# Patient Record
Sex: MALE | Race: WHITE | NOT HISPANIC OR LATINO | Employment: FULL TIME | URBAN - METROPOLITAN AREA
[De-identification: names, ages, dates, MRNs, and addresses within clinical notes are randomized per-mention and may not be internally consistent; named-entity substitution may affect disease eponyms.]

---

## 2017-09-20 ENCOUNTER — TELEPHONE (OUTPATIENT)
Dept: UROLOGY | Facility: CLINIC | Age: 62
End: 2017-09-20

## 2017-09-20 NOTE — TELEPHONE ENCOUNTER
----- Message from Ruth Davis sent at 9/20/2017 11:31 AM CDT -----  Could not make appt today. Would like to reschedule. Next available shows 11/16, pt would like to be seen sooner. Please call back at [phone]. thanks

## 2017-09-25 ENCOUNTER — OFFICE VISIT (OUTPATIENT)
Dept: UROLOGY | Facility: CLINIC | Age: 62
End: 2017-09-25
Payer: COMMERCIAL

## 2017-09-25 ENCOUNTER — LAB VISIT (OUTPATIENT)
Dept: LAB | Facility: HOSPITAL | Age: 62
End: 2017-09-25
Attending: UROLOGY
Payer: COMMERCIAL

## 2017-09-25 VITALS
WEIGHT: 199.44 LBS | DIASTOLIC BLOOD PRESSURE: 83 MMHG | SYSTOLIC BLOOD PRESSURE: 140 MMHG | HEART RATE: 57 BPM | BODY MASS INDEX: 27.04 KG/M2

## 2017-09-25 DIAGNOSIS — N40.0 BENIGN PROSTATIC HYPERPLASIA, PRESENCE OF LOWER URINARY TRACT SYMPTOMS UNSPECIFIED: Primary | ICD-10-CM

## 2017-09-25 DIAGNOSIS — N40.0 BENIGN PROSTATIC HYPERPLASIA, PRESENCE OF LOWER URINARY TRACT SYMPTOMS UNSPECIFIED: ICD-10-CM

## 2017-09-25 LAB — COMPLEXED PSA SERPL-MCNC: 1.6 NG/ML

## 2017-09-25 PROCEDURE — 3077F SYST BP >= 140 MM HG: CPT | Mod: S$GLB,,, | Performed by: UROLOGY

## 2017-09-25 PROCEDURE — 99214 OFFICE O/P EST MOD 30 MIN: CPT | Mod: 25,S$GLB,, | Performed by: UROLOGY

## 2017-09-25 PROCEDURE — 99999 PR PBB SHADOW E&M-EST. PATIENT-LVL II: CPT | Mod: PBBFAC,,, | Performed by: UROLOGY

## 2017-09-25 PROCEDURE — 36415 COLL VENOUS BLD VENIPUNCTURE: CPT

## 2017-09-25 PROCEDURE — 84153 ASSAY OF PSA TOTAL: CPT

## 2017-09-25 PROCEDURE — 3008F BODY MASS INDEX DOCD: CPT | Mod: S$GLB,,, | Performed by: UROLOGY

## 2017-09-25 PROCEDURE — 81002 URINALYSIS NONAUTO W/O SCOPE: CPT | Mod: S$GLB,,, | Performed by: UROLOGY

## 2017-09-25 PROCEDURE — 3079F DIAST BP 80-89 MM HG: CPT | Mod: S$GLB,,, | Performed by: UROLOGY

## 2017-09-25 RX ORDER — FINASTERIDE 5 MG/1
5 TABLET, FILM COATED ORAL DAILY
Qty: 30 TABLET | Refills: 11 | Status: SHIPPED | OUTPATIENT
Start: 2017-09-25 | End: 2018-10-01 | Stop reason: SDUPTHER

## 2017-09-25 RX ORDER — TAMSULOSIN HYDROCHLORIDE 0.4 MG/1
0.4 CAPSULE ORAL DAILY
Qty: 30 CAPSULE | Refills: 11 | Status: SHIPPED | OUTPATIENT
Start: 2017-09-25 | End: 2018-09-28 | Stop reason: SDUPTHER

## 2017-09-25 RX ORDER — ASPIRIN 81 MG/1
81 TABLET ORAL DAILY
COMMUNITY

## 2017-09-25 NOTE — PROGRESS NOTES
"Chief Complaint: Urinary retention    HPI:   9/25/17: No problems at all, Reviewed history in detail.  PSA not done.  Two polyps on colonoscopy were assessed, not worrisome.  7/1/16: No new complaints.  Voiding well.  4/12/16: Alycia: "This 60-year-old male returns to the clinic today because of another episode of urinary retention following gallbladder surgery he had on Friday.  He was catheterized, and has been doing reasonably well with the Soto catheter.  He would prefer to get the catheter out today if that is possible.  My recommendation is to proceed with a voiding trial, and he agrees."  Voided fine  9/9/15: returns after having left IH repair with Dr. Murphy last Friday and entered retention and was discharge with a soto.  Removed today.   7/1/15: No adverse changes - satisfied. PSA approp on finasteride and he is voiding better.  6/25/14: PSA much reduced now on finasteride.  No urinary bother. Since his last visit he is voiding fine and feeling well. Good stream no dribble.  12/20/13: Saw QUINN Gan recently with hesitancy nd elevated PVR while taking cold medications. Instructed to hold them.  Here today with PVR 47ml.  Started finasteride and has been on it.  Last May he had retention  Had elevated PSA and after the instrumentation was removed his PSA lowered appropriately.    6/10/13: Last note: "This patient was seen earlier this year for an acute onset of urinary retention secondary to laparoscopic bilateral hernia repair. He had a voiding trial but failed and had to go back to the ER that night for another catheter. Patient was started on Flomax and had no concerns for a while with another voiding trial a week later. His psa levels were elevated and he completed 4 weeks of cipro and was to have another psa level assessed. Patient presented again as a follow-up to an acute onset of urinary retention, where he presented to [facility] and a soto was placed. Patient was given Levaquin and had his soto " "removed. This was his second time this yr with urinary retention. He feels his stream is okay but sometimes weak; occasional splitting. No gross hematuria before the inguinal hernia surgery but has had some associated with catheterization."    Allergies:  Allergies   Allergen Reactions    Pcn [Penicillins]     Augmentin [Amoxicillin-Pot Clavulanate] Rash       Medications:  has a current medication list which includes the following prescription(s): aspirin, finasteride, olmesartan, and tamsulosin.    PMH:   has a past medical history of BPH (benign prostatic hyperplasia) and HTN (hypertension).    PSH:   has a past surgical history that includes bilateral lap inguinal hernia repair; left ankle; and TONSILLECTOMY, ADENOIDECTOMY.    FamHx: family history is not on file.    SocHx:  reports that he has never smoked. He has never used smokeless tobacco. He reports that he does not drink alcohol. His drug history is not on file.      Physical Exam:  Vitals:    09/25/17 1609   BP: (!) 140/83   Pulse: (!) 57     General: A&Ox3, no apparent distress, no deformities  Neck: No masses, normal thyroid  Abdomen: Soft, NT, ND; has a large reducible left inguinal hernia  Skin: The skin is warm and dry. No jaundice.  Ext: No c/c/e.  :   9/25/17: test desc rima, penis normal, HANDY normal prost 50 gm sm/symm    Labs/Studies:   PVR      6/13: 50 ml    12/20/13: 47 ml    7/1/16: 45 ml  PSA very normal now, 2.2    6/15: 1.6    Impression/Plan:   1. Refilled finasteride/flomax. Doing well RTC 1 yr.           "

## 2017-11-17 ENCOUNTER — TELEPHONE (OUTPATIENT)
Dept: UROLOGY | Facility: CLINIC | Age: 62
End: 2017-11-17

## 2017-11-17 ENCOUNTER — OFFICE VISIT (OUTPATIENT)
Dept: UROLOGY | Facility: CLINIC | Age: 62
End: 2017-11-17
Payer: COMMERCIAL

## 2017-11-17 ENCOUNTER — HOSPITAL ENCOUNTER (EMERGENCY)
Facility: HOSPITAL | Age: 62
Discharge: HOME | End: 2017-11-17
Attending: EMERGENCY MEDICINE
Payer: COMMERCIAL

## 2017-11-17 VITALS
RESPIRATION RATE: 18 BRPM | OXYGEN SATURATION: 96 % | BODY MASS INDEX: 26.85 KG/M2 | HEART RATE: 73 BPM | SYSTOLIC BLOOD PRESSURE: 132 MMHG | DIASTOLIC BLOOD PRESSURE: 77 MMHG | TEMPERATURE: 98 F | WEIGHT: 198 LBS

## 2017-11-17 VITALS
HEART RATE: 70 BPM | SYSTOLIC BLOOD PRESSURE: 134 MMHG | DIASTOLIC BLOOD PRESSURE: 81 MMHG | BODY MASS INDEX: 26.85 KG/M2 | WEIGHT: 198 LBS

## 2017-11-17 DIAGNOSIS — R35.0 URINARY FREQUENCY: Primary | ICD-10-CM

## 2017-11-17 DIAGNOSIS — R10.2 SUPRAPUBIC PAIN: ICD-10-CM

## 2017-11-17 DIAGNOSIS — R33.9 URINARY RETENTION: Primary | ICD-10-CM

## 2017-11-17 LAB
ALBUMIN SERPL BCP-MCNC: 4 G/DL
ALP SERPL-CCNC: 57 U/L
ALT SERPL W/O P-5'-P-CCNC: 25 U/L
ANION GAP SERPL CALC-SCNC: 13 MMOL/L
AST SERPL-CCNC: 23 U/L
BACTERIA #/AREA URNS AUTO: ABNORMAL /HPF
BASOPHILS # BLD AUTO: 0.04 K/UL
BASOPHILS NFR BLD: 0.4 %
BILIRUB SERPL-MCNC: 1.1 MG/DL
BILIRUB UR QL STRIP: NEGATIVE
BUN SERPL-MCNC: 16 MG/DL
CALCIUM SERPL-MCNC: 9.8 MG/DL
CHLORIDE SERPL-SCNC: 106 MMOL/L
CLARITY UR REFRACT.AUTO: CLEAR
CO2 SERPL-SCNC: 21 MMOL/L
COLOR UR AUTO: YELLOW
CREAT SERPL-MCNC: 0.9 MG/DL
DIFFERENTIAL METHOD: ABNORMAL
EOSINOPHIL # BLD AUTO: 0.1 K/UL
EOSINOPHIL NFR BLD: 0.9 %
ERYTHROCYTE [DISTWIDTH] IN BLOOD BY AUTOMATED COUNT: 12.4 %
EST. GFR  (AFRICAN AMERICAN): >60 ML/MIN/1.73 M^2
EST. GFR  (NON AFRICAN AMERICAN): >60 ML/MIN/1.73 M^2
GLUCOSE SERPL-MCNC: 203 MG/DL
GLUCOSE UR QL STRIP: ABNORMAL
HCT VFR BLD AUTO: 44.8 %
HGB BLD-MCNC: 15.8 G/DL
HGB UR QL STRIP: ABNORMAL
KETONES UR QL STRIP: ABNORMAL
LEUKOCYTE ESTERASE UR QL STRIP: NEGATIVE
LIPASE SERPL-CCNC: 19 U/L
LYMPHOCYTES # BLD AUTO: 1.1 K/UL
LYMPHOCYTES NFR BLD: 11.5 %
MCH RBC QN AUTO: 31.9 PG
MCHC RBC AUTO-ENTMCNC: 35.3 G/DL
MCV RBC AUTO: 90 FL
MICROSCOPIC COMMENT: ABNORMAL
MONOCYTES # BLD AUTO: 0.6 K/UL
MONOCYTES NFR BLD: 6.4 %
NEUTROPHILS # BLD AUTO: 7.4 K/UL
NEUTROPHILS NFR BLD: 80.5 %
NITRITE UR QL STRIP: NEGATIVE
NON-SQ EPI CELLS #/AREA URNS AUTO: 7 /HPF
PH UR STRIP: 6 [PH] (ref 5–8)
PLATELET # BLD AUTO: 206 K/UL
PMV BLD AUTO: 10.2 FL
POTASSIUM SERPL-SCNC: 3.9 MMOL/L
PROT SERPL-MCNC: 7.7 G/DL
PROT UR QL STRIP: NEGATIVE
RBC # BLD AUTO: 4.96 M/UL
RBC #/AREA URNS AUTO: >100 /HPF (ref 0–4)
SODIUM SERPL-SCNC: 140 MMOL/L
SP GR UR STRIP: 1.02 (ref 1–1.03)
URN SPEC COLLECT METH UR: ABNORMAL
UROBILINOGEN UR STRIP-ACNC: NEGATIVE EU/DL
WBC # BLD AUTO: 9.17 K/UL
WBC #/AREA URNS AUTO: 5 /HPF (ref 0–5)

## 2017-11-17 PROCEDURE — P9612 CATHETERIZE FOR URINE SPEC: HCPCS

## 2017-11-17 PROCEDURE — 80053 COMPREHEN METABOLIC PANEL: CPT

## 2017-11-17 PROCEDURE — 99999 PR PBB SHADOW E&M-EST. PATIENT-LVL II: CPT | Mod: PBBFAC,,, | Performed by: UROLOGY

## 2017-11-17 PROCEDURE — 99214 OFFICE O/P EST MOD 30 MIN: CPT | Mod: S$GLB,,, | Performed by: UROLOGY

## 2017-11-17 PROCEDURE — 81000 URINALYSIS NONAUTO W/SCOPE: CPT

## 2017-11-17 PROCEDURE — 85025 COMPLETE CBC W/AUTO DIFF WBC: CPT

## 2017-11-17 PROCEDURE — 99284 EMERGENCY DEPT VISIT MOD MDM: CPT | Mod: 25

## 2017-11-17 PROCEDURE — 83690 ASSAY OF LIPASE: CPT

## 2017-11-17 PROCEDURE — 51702 INSERT TEMP BLADDER CATH: CPT

## 2017-11-17 NOTE — PROGRESS NOTES
"Chief Complaint: Urinary retention    HPI:   11/17/17: Over last 3-4d got worsening stream, urgency, SP tenderness; last night entered retention.  Hardly anything came out when catheter was put in though (maybe 50ml).  Was having a ton of pain before that; can't say if it was back or bladder or brain.  Catheter did relieve the symptoms though.  No constipation daily BM but looser lately.  No dysuria.  Taking flomax/finasteride.  9/25/17: No problems at all, Reviewed history in detail.  PSA not done.  Two polyps on colonoscopy were assessed, not worrisome.  7/1/16: No new complaints.  Voiding well.  4/12/16: Alycia: "This 60-year-old male returns to the clinic today because of another episode of urinary retention following gallbladder surgery he had on Friday.  He was catheterized, and has been doing reasonably well with the Soto catheter.  He would prefer to get the catheter out today if that is possible.  My recommendation is to proceed with a voiding trial, and he agrees."  Voided fine  9/9/15: returns after having left IH repair with Dr. Murphy last Friday and entered retention and was discharge with a soto.  Removed today.   7/1/15: No adverse changes - satisfied. PSA approp on finasteride and he is voiding better.  6/25/14: PSA much reduced now on finasteride.  No urinary bother. Since his last visit he is voiding fine and feeling well. Good stream no dribble.  12/20/13: Saw L. Rushing recently with hesitancy nd elevated PVR while taking cold medications. Instructed to hold them.  Here today with PVR 47ml.  Started finasteride and has been on it.  Last May he had retention  Had elevated PSA and after the instrumentation was removed his PSA lowered appropriately.    6/10/13: Last note: "This patient was seen earlier this year for an acute onset of urinary retention secondary to laparoscopic bilateral hernia repair. He had a voiding trial but failed and had to go back to the ER that night for another catheter. " "Patient was started on Flomax and had no concerns for a while with another voiding trial a week later. His psa levels were elevated and he completed 4 weeks of cipro and was to have another psa level assessed. Patient presented again as a follow-up to an acute onset of urinary retention, where he presented to [facility] and a soto was placed. Patient was given Levaquin and had his soto removed. This was his second time this yr with urinary retention. He feels his stream is okay but sometimes weak; occasional splitting. No gross hematuria before the inguinal hernia surgery but has had some associated with catheterization."    Allergies:  Allergies   Allergen Reactions    Pcn [Penicillins]     Augmentin [Amoxicillin-Pot Clavulanate] Rash       Medications:  has a current medication list which includes the following prescription(s): aspirin, finasteride, olmesartan, and tamsulosin.    PMH:   has a past medical history of BPH (benign prostatic hyperplasia) and HTN (hypertension).    PSH:   has a past surgical history that includes bilateral lap inguinal hernia repair; left ankle; and TONSILLECTOMY, ADENOIDECTOMY.    FamHx: family history is not on file.    SocHx:  reports that he has never smoked. He has never used smokeless tobacco. He reports that he does not drink alcohol. His drug history is not on file.      Physical Exam:  Vitals:    11/17/17 1315   BP: 134/81   Pulse: 70     General: A&Ox3, no apparent distress, no deformities  Neck: No masses, normal thyroid  Abdomen: Soft, NT, ND; has a large reducible left inguinal hernia  Skin: The skin is warm and dry. No jaundice.  Ext: No c/c/e.  :   9/25/17: test desc rima, penis normal, HANDY normal prost 50 gm sm/symm    Labs/Studies:   PVR      6/13: 50 ml    12/20/13: 47 ml    7/1/16: 45 ml  PSA very normal now, 2.2    6/15: 1.6    Impression/Plan:   1. RTC next week for cysto/uroflow.          "

## 2017-11-17 NOTE — TELEPHONE ENCOUNTER
----- Message from Jordan Muller sent at 11/17/2017 10:32 AM CST -----  Contact: pt  He's calling in regards to being worked into the drs schedule for a ER f/u appt, [phone] (cell)

## 2017-11-17 NOTE — ED PROVIDER NOTES
Encounter Date: 11/17/2017       History     Chief Complaint   Patient presents with    Urinary Retention     minimal amount of urination x ~ 3 days; reports drops at a time; has had the same issues in the past     Urinary retention since this afternoon.  Unable to empty bladder.  Pt states he has an extremely weak stream when urinating. Hx of bph on medication.  No dysuria.  Came to ER for further evaluation and treatment.      The history is provided by the patient.     Review of patient's allergies indicates:   Allergen Reactions    Pcn [penicillins]     Augmentin [amoxicillin-pot clavulanate] Rash     Past Medical History:   Diagnosis Date    BPH (benign prostatic hyperplasia)     HTN (hypertension)      Past Surgical History:   Procedure Laterality Date    bilateral lap inguinal hernia repair      left ankle      TONSILLECTOMY, ADENOIDECTOMY       No family history on file.  Social History   Substance Use Topics    Smoking status: Never Smoker    Smokeless tobacco: Never Used    Alcohol use No     Review of Systems   Constitutional: Negative for fever.   HENT: Negative for sore throat.    Respiratory: Negative for shortness of breath.    Cardiovascular: Negative for chest pain.   Gastrointestinal: Negative for nausea.   Genitourinary: Positive for dysuria.   Musculoskeletal: Negative for back pain.   Skin: Negative for rash.   Neurological: Negative for weakness.   Hematological: Does not bruise/bleed easily.   All other systems reviewed and are negative.      Physical Exam     Initial Vitals [11/17/17 0316]   BP Pulse Resp Temp SpO2   (!) 179/95 64 20 -- (!) 94 %      MAP       123         Physical Exam    Nursing note and vitals reviewed.  Constitutional: He appears well-developed and well-nourished. He is not diaphoretic. No distress.   HENT:   Head: Normocephalic and atraumatic.   Eyes: EOM are normal. Pupils are equal, round, and reactive to light. No scleral icterus.   Neck: Normal range of  motion. Neck supple. No thyromegaly present.   Cardiovascular: Normal rate, regular rhythm, normal heart sounds and intact distal pulses. Exam reveals no gallop and no friction rub.    No murmur heard.  Pulmonary/Chest: Breath sounds normal. No respiratory distress. He has no wheezes. He has no rhonchi. He exhibits no tenderness.   Abdominal: Soft. Bowel sounds are normal. He exhibits no distension. There is tenderness in the suprapubic area. There is no rigidity, no rebound, no guarding, no CVA tenderness, no tenderness at McBurney's point and negative Walton's sign.       Mild palpable bladder prior to soto placement.  350 ml noted with bladder scan.  Soto placed.  Pt states immediate relief of pressure/discomfort.   Musculoskeletal: Normal range of motion. He exhibits no edema or tenderness.   Lymphadenopathy:     He has no cervical adenopathy.   Neurological: He is alert and oriented to person, place, and time. He has normal strength. No cranial nerve deficit or sensory deficit.   Skin: Skin is warm and dry.   Psychiatric: He has a normal mood and affect. His behavior is normal. Judgment and thought content normal.         ED Course   Procedures  Labs Reviewed   CBC W/ AUTO DIFFERENTIAL - Abnormal; Notable for the following:        Result Value    MCH 31.9 (*)     Gran% 80.5 (*)     Lymph% 11.5 (*)     All other components within normal limits   COMPREHENSIVE METABOLIC PANEL - Abnormal; Notable for the following:     CO2 21 (*)     Glucose 203 (*)     Total Bilirubin 1.1 (*)     All other components within normal limits   URINALYSIS - Abnormal; Notable for the following:     Glucose, UA 1+ (*)     Ketones, UA Trace (*)     Occult Blood UA 3+ (*)     All other components within normal limits   URINALYSIS MICROSCOPIC - Abnormal; Notable for the following:     RBC, UA >100 (*)     Non-Squam Epith 7 (*)     All other components within normal limits   LIPASE                Vitals:    11/17/17 0316 11/17/17 0402  11/17/17 0640   BP: (!) 179/95  132/77   Pulse: 64  73   Resp: 20  18   Temp:  97.8 °F (36.6 °C) 97.9 °F (36.6 °C)   TempSrc:  Oral Oral   SpO2: (!) 94%  96%   Weight: 89.8 kg (198 lb)         Results for orders placed or performed during the hospital encounter of 11/17/17   CBC W/ AUTO DIFFERENTIAL   Result Value Ref Range    WBC 9.17 3.90 - 12.70 K/uL    RBC 4.96 4.60 - 6.20 M/uL    Hemoglobin 15.8 14.0 - 18.0 g/dL    Hematocrit 44.8 40.0 - 54.0 %    MCV 90 82 - 98 fL    MCH 31.9 (H) 27.0 - 31.0 pg    MCHC 35.3 32.0 - 36.0 g/dL    RDW 12.4 11.5 - 14.5 %    Platelets 206 150 - 350 K/uL    MPV 10.2 9.2 - 12.9 fL    Gran # 7.4 1.8 - 7.7 K/uL    Lymph # 1.1 1.0 - 4.8 K/uL    Mono # 0.6 0.3 - 1.0 K/uL    Eos # 0.1 0.0 - 0.5 K/uL    Baso # 0.04 0.00 - 0.20 K/uL    Gran% 80.5 (H) 38.0 - 73.0 %    Lymph% 11.5 (L) 18.0 - 48.0 %    Mono% 6.4 4.0 - 15.0 %    Eosinophil% 0.9 0.0 - 8.0 %    Basophil% 0.4 0.0 - 1.9 %    Differential Method Automated    Comp. Metabolic Panel   Result Value Ref Range    Sodium 140 136 - 145 mmol/L    Potassium 3.9 3.5 - 5.1 mmol/L    Chloride 106 95 - 110 mmol/L    CO2 21 (L) 23 - 29 mmol/L    Glucose 203 (H) 70 - 110 mg/dL    BUN, Bld 16 8 - 23 mg/dL    Creatinine 0.9 0.5 - 1.4 mg/dL    Calcium 9.8 8.7 - 10.5 mg/dL    Total Protein 7.7 6.0 - 8.4 g/dL    Albumin 4.0 3.5 - 5.2 g/dL    Total Bilirubin 1.1 (H) 0.1 - 1.0 mg/dL    Alkaline Phosphatase 57 55 - 135 U/L    AST 23 10 - 40 U/L    ALT 25 10 - 44 U/L    Anion Gap 13 8 - 16 mmol/L    eGFR if African American >60.0 >60 mL/min/1.73 m^2    eGFR if non African American >60.0 >60 mL/min/1.73 m^2   Lipase   Result Value Ref Range    Lipase 19 4 - 60 U/L   Urinalysis - Cath.   Result Value Ref Range    Specimen UA Urine, Catheterized     Color, UA Yellow Yellow, Straw, Kyra    Appearance, UA Clear Clear    pH, UA 6.0 5.0 - 8.0    Specific Gravity, UA 1.025 1.005 - 1.030    Protein, UA Negative Negative    Glucose, UA 1+ (A) Negative    Ketones, UA  Trace (A) Negative    Bilirubin (UA) Negative Negative    Occult Blood UA 3+ (A) Negative    Nitrite, UA Negative Negative    Urobilinogen, UA Negative <2.0 EU/dL    Leukocytes, UA Negative Negative   Urinalysis Microscopic   Result Value Ref Range    RBC, UA >100 (H) 0 - 4 /hpf    WBC, UA 5 0 - 5 /hpf    Bacteria, UA None None-Occ /hpf    Non-Squam Epith 7 (A) <1/hpf /hpf    Microscopic Comment SEE COMMENT          Imaging Results    None         Medications - No data to display    6:02 AM - Re-evaluation: The patient is resting comfortably and is in no acute distress.  symtoms improved with placement of soto.  Will d/c c soto to f/u c urology.  Information given for soto care and pt states he has been discharged with indwelling soto in past. I believe blood noted in urine is from placement of soto but advised to f/u to recheck UA. He states that his symptoms have improved after treatment within ER. Discussed test results, shared treatment plan, specific conditions for return, and importance of follow up with patient and family.  He understands and agrees with the plan as discussed. Answered  his questions at this time. He has remained hemodynamically stable throughout the ED course and is appropriate for discharge home.      Pre-hypertension/Hypertension: The pt has been informed that they may have pre-hypertension or hypertension based on a blood pressure reading in the ED. I recommend that the pt call the PCP listed on their discharge instructions or a physician of their choice this week to arrange f/u for further evaluation of possible pre-hypertension or hypertension.     Delfino Monk was given a handout which discussed their disease process, precautions, and instructions for follow-up and therapy.    Follow-up Information     Edgard Mendenhall MD. Schedule an appointment as soon as possible for a visit in 1 week.    Specialty:  Family Medicine  Contact information:  [address]  [place] [address]  [phone]             Bradly Green IV, MD. Schedule an appointment as soon as possible for a visit in 4 days.    Specialty:  Urology  Contact information:  [address]  [place] [address]  [phone]             [facility].    Specialty:  Emergency Medicine  Why:  As needed, If symptoms worsen  Contact information:  [address] [address] [address] [phone]                 Discharge Medication List as of 11/17/2017  6:09 AM             ED Diagnosis  1. Urinary retention                          ED Course      Clinical Impression:   The encounter diagnosis was Urinary retention.    Disposition:   Disposition: Discharged  Condition: Stable                        Paco Sutton Jr., MD  11/17/17 5925

## 2017-11-17 NOTE — TELEPHONE ENCOUNTER
Spoke with patient and scheduled ER follow up with Dr. Green today to discuss urinary retention. Chávez was placed in ER.

## 2017-11-20 ENCOUNTER — OFFICE VISIT (OUTPATIENT)
Dept: UROLOGY | Facility: CLINIC | Age: 62
End: 2017-11-20
Payer: COMMERCIAL

## 2017-11-20 DIAGNOSIS — N32.81 OAB (OVERACTIVE BLADDER): Primary | ICD-10-CM

## 2017-11-20 DIAGNOSIS — N40.0 BENIGN PROSTATIC HYPERPLASIA, UNSPECIFIED WHETHER LOWER URINARY TRACT SYMPTOMS PRESENT: ICD-10-CM

## 2017-11-20 PROCEDURE — 99499 UNLISTED E&M SERVICE: CPT | Mod: S$GLB,,, | Performed by: UROLOGY

## 2017-11-20 PROCEDURE — 96372 THER/PROPH/DIAG INJ SC/IM: CPT | Mod: 59,S$GLB,, | Performed by: UROLOGY

## 2017-11-20 PROCEDURE — 99999 PR PBB SHADOW E&M-EST. PATIENT-LVL II: CPT | Mod: PBBFAC,,, | Performed by: UROLOGY

## 2017-11-20 PROCEDURE — 52000 CYSTOURETHROSCOPY: CPT | Mod: S$GLB,,, | Performed by: UROLOGY

## 2017-11-20 RX ORDER — OXYBUTYNIN CHLORIDE 5 MG/1
5 TABLET ORAL 3 TIMES DAILY
Qty: 90 TABLET | Refills: 11 | Status: SHIPPED | OUTPATIENT
Start: 2017-11-20 | End: 2017-12-06

## 2017-11-20 RX ORDER — CEFTRIAXONE 1 G/1
1 INJECTION, POWDER, FOR SOLUTION INTRAMUSCULAR; INTRAVENOUS
Status: COMPLETED | OUTPATIENT
Start: 2017-11-20 | End: 2017-11-20

## 2017-11-20 RX ADMIN — CEFTRIAXONE 1 G: 1 INJECTION, POWDER, FOR SOLUTION INTRAMUSCULAR; INTRAVENOUS at 11:11

## 2017-11-20 NOTE — PROGRESS NOTES
..Using aseptic technique, Rocephin 1 gm adm to right ventrogluteal as per written order of Dr. Green.  Pt tolerated procedure well and waited in room for observation for 20 minutes; and left clinic per self without difficulty.

## 2017-11-20 NOTE — PROGRESS NOTES
"Chief Complaint: Urinary retention    HPI:   11/20/17: Cysto today shows very large median lobe but also strong evidence of severe OAB with bladder spasms at 150 ml pushing water out around the scope.  11/17/17: Over last 3-4d got worsening stream, urgency, SP tenderness; last night entered retention.  Hardly anything came out when catheter was put in though (maybe 50ml).  Was having a ton of pain before that; can't say if it was back or bladder or brain.  Catheter did relieve the symptoms though.  No constipation daily BM but looser lately.  No dysuria.  Taking flomax/finasteride.  9/25/17: No problems at all, Reviewed history in detail.  PSA not done.  Two polyps on colonoscopy were assessed, not worrisome.  7/1/16: No new complaints.  Voiding well.  4/12/16: Alycia: "This 60-year-old male returns to the clinic today because of another episode of urinary retention following gallbladder surgery he had on Friday.  He was catheterized, and has been doing reasonably well with the Soto catheter.  He would prefer to get the catheter out today if that is possible.  My recommendation is to proceed with a voiding trial, and he agrees."  Voided fine  9/9/15: returns after having left IH repair with Dr. Murphy last Friday and entered retention and was discharge with a soto.  Removed today.   7/1/15: No adverse changes - satisfied. PSA approp on finasteride and he is voiding better.  6/25/14: PSA much reduced now on finasteride.  No urinary bother. Since his last visit he is voiding fine and feeling well. Good stream no dribble.  12/20/13: Saw L. Rushing recently with hesitancy nd elevated PVR while taking cold medications. Instructed to hold them.  Here today with PVR 47ml.  Started finasteride and has been on it.  Last May he had retention  Had elevated PSA and after the instrumentation was removed his PSA lowered appropriately.    6/10/13: Last note: "This patient was seen earlier this year for an acute onset of urinary " "retention secondary to laparoscopic bilateral hernia repair. He had a voiding trial but failed and had to go back to the ER that night for another catheter. Patient was started on Flomax and had no concerns for a while with another voiding trial a week later. His psa levels were elevated and he completed 4 weeks of cipro and was to have another psa level assessed. Patient presented again as a follow-up to an acute onset of urinary retention, where he presented to [facility] and a soto was placed. Patient was given Levaquin and had his soto removed. This was his second time this yr with urinary retention. He feels his stream is okay but sometimes weak; occasional splitting. No gross hematuria before the inguinal hernia surgery but has had some associated with catheterization."    Allergies:  Allergies   Allergen Reactions    Pcn [Penicillins]     Augmentin [Amoxicillin-Pot Clavulanate] Rash       Medications:  has a current medication list which includes the following prescription(s): aspirin, finasteride, olmesartan, and tamsulosin.    PMH:   has a past medical history of BPH (benign prostatic hyperplasia) and HTN (hypertension).    PSH:   has a past surgical history that includes bilateral lap inguinal hernia repair; left ankle; and TONSILLECTOMY, ADENOIDECTOMY.    FamHx: family history is not on file.    SocHx:  reports that he has never smoked. He has never used smokeless tobacco. He reports that he does not drink alcohol. His drug history is not on file.      Physical Exam:  There were no vitals filed for this visit.  General: A&Ox3, no apparent distress, no deformities  Neck: No masses, normal thyroid  Abdomen: Soft, NT, ND; has a large reducible left inguinal hernia  Skin: The skin is warm and dry. No jaundice.  Ext: No c/c/e.  :   9/25/17: test desc rima, penis normal, HANDY normal prost 50 gm sm/symm    Labs/Studies:   PVR      6/13: 50 ml    12/20/13: 47 ml    7/1/16: 45 ml  PSA very normal now, 2.2    6/15: " 1.6    Procedure: Diagnostic Cystoscopy    Procedure in Detail: After proper consents were obtained, the patient was prepped and draped in normal sterile fashion for diagnostic cystoscopy. 5 ml of lidocaine jelly was instilled in the urethra. The flexible cystoscope was then introduced into the urethra, and advanced into the bladder under direct vision. The urethral mucosa appeared normal, and no strictures were noted. The sphincter appeared to be normal, and the veru montanum was unremarkable. The prostatic mucosa and the lateral lobes of the prostate were opposed and obstructing with very large median lobe. The bladder neck was normal. Inspection of the interior of the bladder was then carried out. The trigone was unremarkable, with no mucosal lesions. The ureteral orifices were normal in position and configuration. Systematic inspection of the mucosa of the bladder it was then carried out, rotating the cystoscope so that all areas of the left and right lateral walls, the dome of the bladder, and the posterior wall were all visualized. The cystoscope was then advanced further into the bladder, and maximum deflection of the scope was performed so that the bladder neck could be inspected. No mucosal lesions were noted there. The cystoscope was then removed, and the procedure terminated.     Findings: BPH with very large median lobe, obvious poor compliance with OAB    ABx: Rocephin 1 gm IM    Impression/Plan:   1. BPH with large median lobe with OAB overlaid as presenting complaint.  Will continue flomax/finasteride and try oxytutinin.  He will hold oxybutinin if retention occurs.  RTC 2 weeks.

## 2017-11-29 ENCOUNTER — TELEPHONE (OUTPATIENT)
Dept: UROLOGY | Facility: CLINIC | Age: 62
End: 2017-11-29

## 2017-11-29 NOTE — TELEPHONE ENCOUNTER
Spoke with pt, he saw a urologist in [place] because of decreased urine output. They placed a soto & drained 800 ml. He has a fu with Dr. Green on 12/6. States he will come to office to get a stabilizer for leg bag.

## 2017-12-02 ENCOUNTER — HOSPITAL ENCOUNTER (EMERGENCY)
Facility: HOSPITAL | Age: 62
Discharge: HOME | End: 2017-12-02
Attending: EMERGENCY MEDICINE
Payer: COMMERCIAL

## 2017-12-02 VITALS
HEIGHT: 72 IN | SYSTOLIC BLOOD PRESSURE: 132 MMHG | TEMPERATURE: 97 F | DIASTOLIC BLOOD PRESSURE: 78 MMHG | RESPIRATION RATE: 18 BRPM | BODY MASS INDEX: 26.47 KG/M2 | HEART RATE: 84 BPM | WEIGHT: 195.44 LBS | OXYGEN SATURATION: 99 %

## 2017-12-02 DIAGNOSIS — N20.1 URETERAL CALCULUS, RIGHT: Primary | ICD-10-CM

## 2017-12-02 LAB
ANION GAP SERPL CALC-SCNC: 9 MMOL/L
BACTERIA #/AREA URNS HPF: NORMAL /HPF
BASOPHILS # BLD AUTO: 0.03 K/UL
BASOPHILS NFR BLD: 0.2 %
BILIRUB UR QL STRIP: NEGATIVE
BUN SERPL-MCNC: 15 MG/DL
CALCIUM SERPL-MCNC: 9.1 MG/DL
CAOX CRY URNS QL MICRO: NORMAL
CHLORIDE SERPL-SCNC: 104 MMOL/L
CLARITY UR: CLEAR
CO2 SERPL-SCNC: 23 MMOL/L
COLOR UR: YELLOW
CREAT SERPL-MCNC: 1.3 MG/DL
DIFFERENTIAL METHOD: ABNORMAL
EOSINOPHIL # BLD AUTO: 0 K/UL
EOSINOPHIL NFR BLD: 0.2 %
ERYTHROCYTE [DISTWIDTH] IN BLOOD BY AUTOMATED COUNT: 12.6 %
EST. GFR  (AFRICAN AMERICAN): >60 ML/MIN/1.73 M^2
EST. GFR  (NON AFRICAN AMERICAN): 58 ML/MIN/1.73 M^2
GLUCOSE SERPL-MCNC: 133 MG/DL
GLUCOSE UR QL STRIP: NEGATIVE
HCT VFR BLD AUTO: 42.9 %
HGB BLD-MCNC: 15.5 G/DL
HGB UR QL STRIP: ABNORMAL
KETONES UR QL STRIP: ABNORMAL
LEUKOCYTE ESTERASE UR QL STRIP: ABNORMAL
LYMPHOCYTES # BLD AUTO: 1.4 K/UL
LYMPHOCYTES NFR BLD: 11.4 %
MCH RBC QN AUTO: 32.6 PG
MCHC RBC AUTO-ENTMCNC: 36.1 G/DL
MCV RBC AUTO: 90 FL
MICROSCOPIC COMMENT: NORMAL
MONOCYTES # BLD AUTO: 1.3 K/UL
MONOCYTES NFR BLD: 10.6 %
NEUTROPHILS # BLD AUTO: 9.3 K/UL
NEUTROPHILS NFR BLD: 77.6 %
NITRITE UR QL STRIP: NEGATIVE
PH UR STRIP: 6 [PH] (ref 5–8)
PLATELET # BLD AUTO: 203 K/UL
PMV BLD AUTO: 9.9 FL
POTASSIUM SERPL-SCNC: 4 MMOL/L
PROT UR QL STRIP: NEGATIVE
RBC # BLD AUTO: 4.75 M/UL
RBC #/AREA URNS HPF: 1 /HPF (ref 0–4)
SODIUM SERPL-SCNC: 136 MMOL/L
SP GR UR STRIP: <=1.005 (ref 1–1.03)
SQUAMOUS #/AREA URNS HPF: 1 /HPF
URN SPEC COLLECT METH UR: ABNORMAL
UROBILINOGEN UR STRIP-ACNC: NEGATIVE EU/DL
WBC # BLD AUTO: 12.02 K/UL
WBC #/AREA URNS HPF: 5 /HPF (ref 0–5)

## 2017-12-02 PROCEDURE — 25000003 PHARM REV CODE 250: Performed by: EMERGENCY MEDICINE

## 2017-12-02 PROCEDURE — 99284 EMERGENCY DEPT VISIT MOD MDM: CPT | Mod: 25

## 2017-12-02 PROCEDURE — 63600175 PHARM REV CODE 636 W HCPCS: Performed by: EMERGENCY MEDICINE

## 2017-12-02 PROCEDURE — P9612 CATHETERIZE FOR URINE SPEC: HCPCS

## 2017-12-02 PROCEDURE — 96374 THER/PROPH/DIAG INJ IV PUSH: CPT

## 2017-12-02 PROCEDURE — 85025 COMPLETE CBC W/AUTO DIFF WBC: CPT

## 2017-12-02 PROCEDURE — 80048 BASIC METABOLIC PNL TOTAL CA: CPT

## 2017-12-02 PROCEDURE — 81000 URINALYSIS NONAUTO W/SCOPE: CPT

## 2017-12-02 PROCEDURE — 96361 HYDRATE IV INFUSION ADD-ON: CPT

## 2017-12-02 PROCEDURE — 96375 TX/PRO/DX INJ NEW DRUG ADDON: CPT

## 2017-12-02 RX ORDER — HYDROCODONE BITARTRATE AND ACETAMINOPHEN 7.5; 325 MG/1; MG/1
1 TABLET ORAL EVERY 4 HOURS PRN
Qty: 15 TABLET | Refills: 0 | Status: SHIPPED | OUTPATIENT
Start: 2017-12-02 | End: 2017-12-27

## 2017-12-02 RX ORDER — ONDANSETRON 2 MG/ML
4 INJECTION INTRAMUSCULAR; INTRAVENOUS
Status: COMPLETED | OUTPATIENT
Start: 2017-12-02 | End: 2017-12-02

## 2017-12-02 RX ORDER — KETOROLAC TROMETHAMINE 30 MG/ML
15 INJECTION, SOLUTION INTRAMUSCULAR; INTRAVENOUS
Status: COMPLETED | OUTPATIENT
Start: 2017-12-02 | End: 2017-12-02

## 2017-12-02 RX ORDER — MORPHINE SULFATE 2 MG/ML
6 INJECTION, SOLUTION INTRAMUSCULAR; INTRAVENOUS
Status: COMPLETED | OUTPATIENT
Start: 2017-12-02 | End: 2017-12-02

## 2017-12-02 RX ORDER — ONDANSETRON 4 MG/1
4 TABLET, FILM COATED ORAL EVERY 8 HOURS PRN
Qty: 12 TABLET | Refills: 0 | Status: SHIPPED | OUTPATIENT
Start: 2017-12-02 | End: 2017-12-27

## 2017-12-02 RX ADMIN — ONDANSETRON HYDROCHLORIDE 4 MG: 2 INJECTION, SOLUTION INTRAMUSCULAR; INTRAVENOUS at 08:12

## 2017-12-02 RX ADMIN — Medication 6 MG: at 08:12

## 2017-12-02 RX ADMIN — SODIUM CHLORIDE 1000 ML: 0.9 INJECTION, SOLUTION INTRAVENOUS at 08:12

## 2017-12-02 RX ADMIN — KETOROLAC TROMETHAMINE 15 MG: 30 INJECTION, SOLUTION INTRAMUSCULAR at 08:12

## 2017-12-02 NOTE — ED PROVIDER NOTES
SCRIBE #1 NOTE: I, Arielle Haile, am scribing for, and in the presence of, Anthony Galindo MD. I have scribed the entire note.      History      Chief Complaint   Patient presents with    Back Pain     reports having a cath in and started having right lower back pain.        Review of patient's allergies indicates:   Allergen Reactions    Pcn [penicillins]     Augmentin [amoxicillin-pot clavulanate] Rash        HPI   HPI    12/2/2017, 7:41 AM   History obtained from the patient      History of Present Illness: Delfino Monk is a 62 y.o. male patient with a PMHx of an enlarged prostate who presents to the Emergency Department for a stabbing, sharp R flank pain which onset gradually yesterday. Pt has a soto cath in place. Pt states the pain was an 8/10 last PM and currently rates his pain a 5/10 in severity. No mitigating or exacerbating factors reported. No other associated sxs reported. Patient denies any fever, chills, n/v, abd pain, dysuria, hematuria, frequency, and all other sxs at this time. Pt is currently taking Flomax and oxybutynin. No further complaints or concerns at this time.       Arrival mode: Personal vehicle    PCP: Edgard Mendenhall MD       Past Medical History:  Past Medical History:   Diagnosis Date    BPH (benign prostatic hyperplasia)     HTN (hypertension)        Past Surgical History:  Past Surgical History:   Procedure Laterality Date    bilateral lap inguinal hernia repair      left ankle      TONSILLECTOMY, ADENOIDECTOMY           Family History:  History reviewed. No pertinent family history.    Social History:  Social History     Social History Main Topics    Smoking status: Never Smoker    Smokeless tobacco: Never Used    Alcohol use No    Drug use: Unknown    Sexual activity: unknown       ROS   Review of Systems   Constitutional: Negative for chills and fever.   HENT: Negative for sore throat.    Respiratory: Negative for shortness of breath.    Cardiovascular:  Negative for chest pain.   Gastrointestinal: Negative for abdominal pain, nausea and vomiting.   Genitourinary: Positive for flank pain (R). Negative for dysuria, frequency and hematuria.   Musculoskeletal: Negative for back pain.   Skin: Negative for rash.   Neurological: Negative for weakness.   Hematological: Does not bruise/bleed easily.   All other systems reviewed and are negative.      Physical Exam      Initial Vitals [12/02/17 0641]   BP Pulse Resp Temp SpO2   (!) 153/84 79 19 97.7 °F (36.5 °C) 96 %      MAP       107          Physical Exam  Nursing Notes and Vital Signs Reviewed.  Constitutional: Patient is in no acute distress. Well-developed and well-nourished.  Head: Atraumatic. Normocephalic.  Eyes: PERRL. EOM intact. Conjunctivae are not pale. No scleral icterus.  ENT: Mucous membranes are moist. Oropharynx is clear and symmetric.    Neck: Supple. Full ROM. No lymphadenopathy.  Cardiovascular: Regular rate. Regular rhythm. No murmurs, rubs, or gallops. Distal pulses are 2+ and symmetric.  Pulmonary/Chest: No respiratory distress. Clear to auscultation bilaterally. No wheezing or rales.  Abdominal: Soft and non-distended.  There is no tenderness.  No rebound, guarding, or rigidity. Good bowel sounds.  Genitourinary: No CVA tenderness. Chávez in place.   Musculoskeletal: Moves all extremities. No obvious deformities. No edema. No calf tenderness.  Skin: Warm and dry.  Neurological:  Alert, awake, and appropriate.  Normal speech.  No acute focal neurological deficits are appreciated.  Psychiatric: Normal affect. Good eye contact. Appropriate in content.    ED Course    Procedures  ED Vital Signs:  Vitals:    12/02/17 0641 12/02/17 0828 12/02/17 1024   BP: (!) 153/84 (!) 140/70 132/78   Pulse: 79 78 84   Resp: 19 20 18   Temp: 97.7 °F (36.5 °C) 98 °F (36.7 °C) 97.2 °F (36.2 °C)   TempSrc: Oral Oral Oral   SpO2: 96% 98% 99%   Weight: 88.6 kg (195 lb 7 oz)     Height: 6' (1.829 m)         Abnormal Lab  Results:  Labs Reviewed   URINALYSIS - Abnormal; Notable for the following:        Result Value    Specific Gravity, UA <=1.005 (*)     Ketones, UA Trace (*)     Occult Blood UA 2+ (*)     Leukocytes, UA Trace (*)     All other components within normal limits   CBC W/ AUTO DIFFERENTIAL - Abnormal; Notable for the following:     MCH 32.6 (*)     MCHC 36.1 (*)     Gran # 9.3 (*)     Mono # 1.3 (*)     Gran% 77.6 (*)     Lymph% 11.4 (*)     All other components within normal limits   BASIC METABOLIC PANEL - Abnormal; Notable for the following:     Glucose 133 (*)     eGFR if non  58 (*)     All other components within normal limits   URINALYSIS MICROSCOPIC        All Lab Results:  Results for orders placed or performed during the hospital encounter of 12/02/17   Urinalysis Catheterized   Result Value Ref Range    Specimen UA Urine, Clean Catch     Color, UA Yellow Yellow, Straw, Kyra    Appearance, UA Clear Clear    pH, UA 6.0 5.0 - 8.0    Specific Gravity, UA <=1.005 (A) 1.005 - 1.030    Protein, UA Negative Negative    Glucose, UA Negative Negative    Ketones, UA Trace (A) Negative    Bilirubin (UA) Negative Negative    Occult Blood UA 2+ (A) Negative    Nitrite, UA Negative Negative    Urobilinogen, UA Negative <2.0 EU/dL    Leukocytes, UA Trace (A) Negative   CBC auto differential   Result Value Ref Range    WBC 12.02 3.90 - 12.70 K/uL    RBC 4.75 4.60 - 6.20 M/uL    Hemoglobin 15.5 14.0 - 18.0 g/dL    Hematocrit 42.9 40.0 - 54.0 %    MCV 90 82 - 98 fL    MCH 32.6 (H) 27.0 - 31.0 pg    MCHC 36.1 (H) 32.0 - 36.0 g/dL    RDW 12.6 11.5 - 14.5 %    Platelets 203 150 - 350 K/uL    MPV 9.9 9.2 - 12.9 fL    Gran # 9.3 (H) 1.8 - 7.7 K/uL    Lymph # 1.4 1.0 - 4.8 K/uL    Mono # 1.3 (H) 0.3 - 1.0 K/uL    Eos # 0.0 0.0 - 0.5 K/uL    Baso # 0.03 0.00 - 0.20 K/uL    Gran% 77.6 (H) 38.0 - 73.0 %    Lymph% 11.4 (L) 18.0 - 48.0 %    Mono% 10.6 4.0 - 15.0 %    Eosinophil% 0.2 0.0 - 8.0 %    Basophil% 0.2 0.0 - 1.9  %    Differential Method Automated    Basic metabolic panel   Result Value Ref Range    Sodium 136 136 - 145 mmol/L    Potassium 4.0 3.5 - 5.1 mmol/L    Chloride 104 95 - 110 mmol/L    CO2 23 23 - 29 mmol/L    Glucose 133 (H) 70 - 110 mg/dL    BUN, Bld 15 8 - 23 mg/dL    Creatinine 1.3 0.5 - 1.4 mg/dL    Calcium 9.1 8.7 - 10.5 mg/dL    Anion Gap 9 8 - 16 mmol/L    eGFR if African American >60 >60 mL/min/1.73 m^2    eGFR if non African American 58 (A) >60 mL/min/1.73 m^2   Urinalysis Microscopic   Result Value Ref Range    RBC, UA 1 0 - 4 /hpf    WBC, UA 5 0 - 5 /hpf    Bacteria, UA None None-Occ /hpf    Squam Epithel, UA 1 /hpf    Ca Oxalate Monalisa, UA Rare None-Moderate    Microscopic Comment SEE COMMENT        Imaging Results:  Imaging Results          CT Renal Stone Study ABD Pelvis WO (Final result)  Result time 12/02/17 08:20:41    Final result by Jeancarlos Perez Jr., MD (12/02/17 08:20:41)                 Impression:       1. Relatively large stone within the distal right ureter at the ureterovesical junction.  There is moderate right hydroureteronephrosis and inflammatory change about the right kidney.    2.  No additional stone burden.    3.  There is a Chávez catheter in place.  The prostate gland is markedly enlarged.    4.  Left sided fat containing inguinal hernia.    5.  Bilateral L5 pars defects with grade 1 spondylolisthesis.      Electronically signed by: JEANCARLOS PEREZ M.D.  Date:     12/02/17  Time:    08:20              Narrative:    EXAM: [id number] RENAL STONE STUDY ABD PELVIS WO    CLINICAL HISTORY:  right flank pain    Technique:   Routine noncontrast CT of the abdomen and pelvis was performed. All CT scans at this facility use dose modulation, iterative reconstruction, and/or weight based dosing when appropriate to reduce radiation dose to as low as reasonably achievable.     Comparison: No prior abdominal CT available for comparison.     FINDINGS: No significant abnormality is seen in the lung  bases. There are bilateral L5 pars defects with grade 1 spondylolisthesis of L5 on S1.    There is a stone within the right uterovesical junction that measure 7 mm craniocaudal by 7 mm transverse by 13 mm AP.  There is moderate hydroureter nephrosis on the right with stranding inflammatory change about the right kidney.  No stones are present within the left kidney or ureter.  There is a urinary catheter in place.  The prostate gland is very enlarged and contains dystrophic calcifications.    The small bowel and colon are within normal limits for noncontrast technique. There is no evidence of acute appendicitis. No evidence of bowel obstruction or acute inflammatory process. No free fluid, free air or abscess identified.     Prior cholecystectomy. The liver, pancreas, spleen and adrenals are within normal limits for noncontrast technique. No aortic aneurysm identified. No pathologically enlarged lymph nodes are identified. There is a fat containing left inguinal hernia.                                      The Emergency Provider reviewed the vital signs and test results, which are outlined above.    ED Discussion     9:47 AM: Reassessed pt at this time.  Pt states his condition has improved at this time. Discussed with pt all pertinent ED information and results. Discussed pt dx and plan of tx. Gave pt all f/u and return to the ED instructions. All questions and concerns were addressed at this time. Pt expresses understanding of information and instructions, and is comfortable with plan to discharge. Pt is stable for discharge.    I discussed with patient and/or family/caretaker that evaluation in the ED does not suggest any emergent or life threatening medical conditions requiring immediate intervention beyond what was provided in the ED, and I believe patient is safe for discharge.  Regardless, an unremarkable evaluation in the ED does not preclude the development or presence of a serious of life threatening  condition. As such, patient was instructed to return immediately for any worsening or change in current symptoms.      ED Medication(s):  Medications   sodium chloride 0.9% bolus 1,000 mL (0 mLs Intravenous Stopped 12/2/17 0943)   ketorolac injection 15 mg (15 mg Intravenous Given 12/2/17 0810)   morphine injection 6 mg (6 mg Intravenous Given 12/2/17 0847)   ondansetron injection 4 mg (4 mg Intravenous Given 12/2/17 0845)       Discharge Medication List as of 12/2/2017  9:47 AM      START taking these medications    Details   hydrocodone-acetaminophen 7.5-325mg (NORCO) 7.5-325 mg per tablet Take 1 tablet by mouth every 4 (four) hours as needed for Pain., Starting Sat 12/2/2017, Print      ondansetron (ZOFRAN) 4 MG tablet Take 1 tablet (4 mg total) by mouth every 8 (eight) hours as needed for Nausea., Starting Sat 12/2/2017, Print                   Medical Decision Making    Medical Decision Making:   Clinical Tests:   Lab Tests: Ordered and Reviewed  Radiological Study: Reviewed and Ordered           Scribe Attestation:   Scribe #1: I performed the above scribed service and the documentation accurately describes the services I performed. I attest to the accuracy of the note.    Attending:   Physician Attestation Statement for Scribe #1: I, Anthony Galindo MD, personally performed the services described in this documentation, as scribed by Arielle Haile, in my presence, and it is both accurate and complete.          Clinical Impression       ICD-10-CM ICD-9-CM   1. Ureteral calculus, right N20.1 592.1       Disposition:   Disposition: Discharged  Condition: Stable         Anthony Galindo MD  12/03/17 5808

## 2017-12-06 ENCOUNTER — HOSPITAL ENCOUNTER (OUTPATIENT)
Dept: RADIOLOGY | Facility: HOSPITAL | Age: 62
Discharge: HOME | End: 2017-12-06
Attending: UROLOGY
Payer: COMMERCIAL

## 2017-12-06 ENCOUNTER — CLINICAL SUPPORT (OUTPATIENT)
Dept: CARDIOLOGY | Facility: CLINIC | Age: 62
End: 2017-12-06
Payer: COMMERCIAL

## 2017-12-06 ENCOUNTER — OFFICE VISIT (OUTPATIENT)
Dept: UROLOGY | Facility: CLINIC | Age: 62
End: 2017-12-06
Payer: COMMERCIAL

## 2017-12-06 VITALS
DIASTOLIC BLOOD PRESSURE: 84 MMHG | HEART RATE: 72 BPM | SYSTOLIC BLOOD PRESSURE: 142 MMHG | WEIGHT: 192 LBS | BODY MASS INDEX: 26.04 KG/M2

## 2017-12-06 DIAGNOSIS — N20.1 URETERAL STONE: Primary | ICD-10-CM

## 2017-12-06 DIAGNOSIS — N20.1 URETERAL STONE: ICD-10-CM

## 2017-12-06 PROCEDURE — 93000 ELECTROCARDIOGRAM COMPLETE: CPT | Mod: S$GLB,,, | Performed by: INTERNAL MEDICINE

## 2017-12-06 PROCEDURE — 99999 PR PBB SHADOW E&M-EST. PATIENT-LVL III: CPT | Mod: PBBFAC,,, | Performed by: UROLOGY

## 2017-12-06 PROCEDURE — 99214 OFFICE O/P EST MOD 30 MIN: CPT | Mod: 57,S$GLB,, | Performed by: UROLOGY

## 2017-12-06 PROCEDURE — 71020 XR CHEST PA AND LATERAL: CPT | Mod: TC

## 2017-12-06 PROCEDURE — 74020 XR ABDOMEN AP WITH LEFT DECUB: CPT | Mod: 26,,, | Performed by: RADIOLOGY

## 2017-12-06 PROCEDURE — 71020 XR CHEST PA AND LATERAL: CPT | Mod: 26,,, | Performed by: RADIOLOGY

## 2017-12-06 PROCEDURE — 74020 XR ABDOMEN AP WITH LEFT DECUB: CPT | Mod: TC

## 2017-12-06 NOTE — PROGRESS NOTES
"Chief Complaint: Urinary retention    HPI:   12/6/17: Was traveling and entered retention he thought he was in retention with 800ml and a soto was placed.  Had a CT after that that showed a kidney stone and was given some pain meds but has had no pain since he was at the ER.  Was using the oxybutinin when he entered retention.  Has a 8mm right UVJ stone.  11/20/17: Cysto today shows very large median lobe but also strong evidence of severe OAB with bladder spasms at 150 ml pushing water out around the scope.  11/17/17: Over last 3-4d got worsening stream, urgency, SP tenderness; last night entered retention.  Hardly anything came out when catheter was put in though (maybe 50ml).  Was having a ton of pain before that; can't say if it was back or bladder or brain.  Catheter did relieve the symptoms though.  No constipation daily BM but looser lately.  No dysuria.  Taking flomax/finasteride.  9/25/17: No problems at all, Reviewed history in detail.  PSA not done.  Two polyps on colonoscopy were assessed, not worrisome.  7/1/16: No new complaints.  Voiding well.  4/12/16: Alycia: "This 60-year-old male returns to the clinic today because of another episode of urinary retention following gallbladder surgery he had on Friday.  He was catheterized, and has been doing reasonably well with the Soto catheter.  He would prefer to get the catheter out today if that is possible.  My recommendation is to proceed with a voiding trial, and he agrees."  Voided fine  9/9/15: returns after having left IH repair with Dr. Murphy last Friday and entered retention and was discharge with a soto.  Removed today.   7/1/15: No adverse changes - satisfied. PSA approp on finasteride and he is voiding better.  6/25/14: PSA much reduced now on finasteride.  No urinary bother. Since his last visit he is voiding fine and feeling well. Good stream no dribble.  12/20/13: Saw L. Rushing recently with hesitancy nd elevated PVR while taking cold " "medications. Instructed to hold them.  Here today with PVR 47ml.  Started finasteride and has been on it.  Last May he had retention  Had elevated PSA and after the instrumentation was removed his PSA lowered appropriately.    6/10/13: Last note: "This patient was seen earlier this year for an acute onset of urinary retention secondary to laparoscopic bilateral hernia repair. He had a voiding trial but failed and had to go back to the ER that night for another catheter. Patient was started on Flomax and had no concerns for a while with another voiding trial a week later. His psa levels were elevated and he completed 4 weeks of cipro and was to have another psa level assessed. Patient presented again as a follow-up to an acute onset of urinary retention, where he presented to [facility] and a soto was placed. Patient was given Levaquin and had his soto removed. This was his second time this yr with urinary retention. He feels his stream is okay but sometimes weak; occasional splitting. No gross hematuria before the inguinal hernia surgery but has had some associated with catheterization."    Allergies:  Allergies   Allergen Reactions    Pcn [Penicillins]     Augmentin [Amoxicillin-Pot Clavulanate] Rash       Medications:  has a current medication list which includes the following prescription(s): aspirin, finasteride, olmesartan, tamsulosin, hydrocodone-acetaminophen 7.5-325mg, and ondansetron.    PMH:   has a past medical history of BPH (benign prostatic hyperplasia) and HTN (hypertension).    PSH:   has a past surgical history that includes bilateral lap inguinal hernia repair; left ankle; and TONSILLECTOMY, ADENOIDECTOMY.    FamHx: family history is not on file.    SocHx:  reports that he has never smoked. He has never used smokeless tobacco. He reports that he does not drink alcohol. His drug history is not on file.      Physical Exam:  Vitals:    12/06/17 0905   BP: (!) 142/84   Pulse: 72     General: A&Ox3, no " apparent distress, no deformities  Neck: No masses, normal thyroid  Abdomen: Soft, NT, ND; has a large reducible left inguinal hernia  Skin: The skin is warm and dry. No jaundice.  Ext: No c/c/e.  :   9/25/17: test desc rima, penis normal, HANDY normal prost 50 gm sm/symm    Labs/Studies:   PVR      6/13: 50 ml    12/20/13: 47 ml    7/1/16: 45 ml  PSA very normal now, 2.2    6/15: 1.6    Impression/Plan:   1. Likely has the right UVJ stone still.  Will keep soto until we can get URS done, and then will see if this is related to his OAB/retention story before moving toward TURP.  No oxybutinin from now forward.  2. KUB today.  Plan for right URS with laser lithotripsy.  Risks/benefits reviewed, consents on chart.  Will leave no string on stent and place soto, with plan for postop cysto/stent removal/uroflow one week after procedure.

## 2017-12-07 NOTE — PRE-PROCEDURE INSTRUCTIONS
Pre op instructions reviewed with patient per phone:    To confirm, Your surgeon has instructed you:  Surgery is scheduled 12/12/17 at [facility] [place]      Please report to [facility] 1st floor main lobby by MD.   Pre admit office to call afternoon prior to surgery with final arrival time      INSTRUCTIONS IMPORTANT!!!  ¨ Do not eat, drink, or smoke after 12 midnight-including water. OK to brush teeth, no gum, candy or mints!    ¨ Take only these medicines with a small swallow of water-morning of surgery.  None  ____  Do not wear makeup, including mascara.  ____  No powder, lotions or creams to surgical area.  ____  Please remove all jewelry, including piercings and leave at home.  ____  No money or valuables needed. Please leave at home.  ____  Please bring identification and insurance information to hospital.  ____  If going home the same day, arrange for a ride home. You will not be able to   drive if Anesthesia was used.  ____  Children, under 12 years old, must remain in the waiting room with an adult.  They are not allowed in patient areas.  ____  Wear loose fitting clothing. Allow for dressings, bandages.  ____  Stop Aspirin, Ibuprofen, Motrin and Aleve at least 5-7 days before surgery, unless otherwise instructed by your doctor, or the nurse.   You MAY use Tylenol/acetaminophen until day of surgery.  ____  If you take diabetic medication, do not take am of surgery unless instructed by   Doctor.  ____ Stop taking any Fish Oil supplement or any Vitamins that contain Vitamin E at least 5 days prior to surgery.          Bathing Instructions-- The night before surgery and the morning prior to coming to the hospital:   -Do not shave the surgical area.   -Shower and wash your hair and body as usual with anti-bacterial  soap and shampoo.   -Rinse your hair and body completely.   -Use one packet of hibiclens to wash the surgical site (using your hand) gently for 5 minutes.  Do not scrub you skin too  hard.   -Do not use hibiclens on your head, face, or genitals.   -Do not wash with anti-bacterial soap after you use the hibiclens.   -Rinse your body thoroughly.   -Dry with clean, soft towel.  Do not use lotion, cream, deodorant, or powders on   the surgical site.    Use antibacterial soap in place of hibiclens if your surgery is on the head, face or genitals.         Surgical Site Infection    Prevention of surgical site infections:     -Keep incisions clean and dry.   -Do not soak/submerge incisions in water until completely healed.   -Do not apply lotions, powders, creams, or deodorants to site.   -Always make sure hands are cleaned with antibacterial soap/ alcohol-based   prior to touching the surgical site.  (This includes doctors, nurses, staff, and yourself.)    Signs and symptoms:   -Redness and pain around the area where you had surgery   -Drainage of cloudy fluid from your surgical wound   -Fever over 100.4  I have read or had read and explained to me, and understand the above information.

## 2017-12-11 ENCOUNTER — ANESTHESIA EVENT (OUTPATIENT)
Dept: SURGERY | Facility: HOSPITAL | Age: 62
End: 2017-12-11
Payer: COMMERCIAL

## 2017-12-11 ENCOUNTER — TELEPHONE (OUTPATIENT)
Dept: UROLOGY | Facility: CLINIC | Age: 62
End: 2017-12-11

## 2017-12-12 ENCOUNTER — TELEPHONE (OUTPATIENT)
Dept: UROLOGY | Facility: CLINIC | Age: 62
End: 2017-12-12

## 2017-12-12 ENCOUNTER — SURGERY (OUTPATIENT)
Age: 62
End: 2017-12-12

## 2017-12-12 ENCOUNTER — HOSPITAL ENCOUNTER (OUTPATIENT)
Facility: HOSPITAL | Age: 62
Discharge: HOME | End: 2017-12-12
Attending: UROLOGY | Admitting: UROLOGY
Payer: COMMERCIAL

## 2017-12-12 ENCOUNTER — ANESTHESIA (OUTPATIENT)
Dept: SURGERY | Facility: HOSPITAL | Age: 62
End: 2017-12-12
Payer: COMMERCIAL

## 2017-12-12 DIAGNOSIS — N20.1 URETERAL STONE: ICD-10-CM

## 2017-12-12 PROCEDURE — 63600175 PHARM REV CODE 636 W HCPCS: Performed by: NURSE ANESTHETIST, CERTIFIED REGISTERED

## 2017-12-12 PROCEDURE — 25000003 PHARM REV CODE 250: Performed by: NURSE ANESTHETIST, CERTIFIED REGISTERED

## 2017-12-12 PROCEDURE — 88300 SURGICAL PATH GROSS: CPT | Performed by: PATHOLOGY

## 2017-12-12 PROCEDURE — 25000003 PHARM REV CODE 250: Performed by: ANESTHESIOLOGY

## 2017-12-12 PROCEDURE — 25000003 PHARM REV CODE 250: Performed by: UROLOGY

## 2017-12-12 PROCEDURE — C2617 STENT, NON-COR, TEM W/O DEL: HCPCS | Performed by: UROLOGY

## 2017-12-12 PROCEDURE — 36000707: Performed by: UROLOGY

## 2017-12-12 PROCEDURE — C1769 GUIDE WIRE: HCPCS | Performed by: UROLOGY

## 2017-12-12 PROCEDURE — 88300 SURGICAL PATH GROSS: CPT | Mod: 26,,, | Performed by: PATHOLOGY

## 2017-12-12 PROCEDURE — C1758 CATHETER, URETERAL: HCPCS | Performed by: UROLOGY

## 2017-12-12 PROCEDURE — 82365 CALCULUS SPECTROSCOPY: CPT

## 2017-12-12 PROCEDURE — 63600175 PHARM REV CODE 636 W HCPCS: Performed by: UROLOGY

## 2017-12-12 PROCEDURE — 71000015 HC POSTOP RECOV 1ST HR: Performed by: UROLOGY

## 2017-12-12 PROCEDURE — 37000009 HC ANESTHESIA EA ADD 15 MINS: Performed by: UROLOGY

## 2017-12-12 PROCEDURE — 71000033 HC RECOVERY, INTIAL HOUR: Performed by: UROLOGY

## 2017-12-12 PROCEDURE — C1773 RET DEV, INSERTABLE: HCPCS | Performed by: UROLOGY

## 2017-12-12 PROCEDURE — 76000 FLUOROSCOPY <1 HR PHYS/QHP: CPT | Mod: 26,59,, | Performed by: UROLOGY

## 2017-12-12 PROCEDURE — 36000706: Performed by: UROLOGY

## 2017-12-12 PROCEDURE — 37000008 HC ANESTHESIA 1ST 15 MINUTES: Performed by: UROLOGY

## 2017-12-12 PROCEDURE — 52352 CYSTOURETERO W/STONE REMOVE: CPT | Mod: RT,,, | Performed by: UROLOGY

## 2017-12-12 PROCEDURE — 52332 CYSTOSCOPY AND TREATMENT: CPT | Mod: 51,RT,, | Performed by: UROLOGY

## 2017-12-12 DEVICE — STENT URETERAL UNIV 6FR 28CM: Type: IMPLANTABLE DEVICE | Site: URETER | Status: FUNCTIONAL

## 2017-12-12 RX ORDER — ROCURONIUM BROMIDE 10 MG/ML
INJECTION, SOLUTION INTRAVENOUS
Status: DISCONTINUED | OUTPATIENT
Start: 2017-12-12 | End: 2017-12-12

## 2017-12-12 RX ORDER — HYDROCODONE BITARTRATE AND ACETAMINOPHEN 5; 325 MG/1; MG/1
1 TABLET ORAL EVERY 4 HOURS PRN
Status: DISCONTINUED | OUTPATIENT
Start: 2017-12-12 | End: 2017-12-12 | Stop reason: HOSPADM

## 2017-12-12 RX ORDER — CEFAZOLIN SODIUM 2 G/50ML
2 SOLUTION INTRAVENOUS
Status: DISCONTINUED | OUTPATIENT
Start: 2017-12-12 | End: 2017-12-12 | Stop reason: HOSPADM

## 2017-12-12 RX ORDER — FUROSEMIDE 10 MG/ML
INJECTION INTRAMUSCULAR; INTRAVENOUS
Status: DISCONTINUED | OUTPATIENT
Start: 2017-12-12 | End: 2017-12-12

## 2017-12-12 RX ORDER — MORPHINE SULFATE 2 MG/ML
2 INJECTION, SOLUTION INTRAMUSCULAR; INTRAVENOUS EVERY 5 MIN PRN
Status: DISCONTINUED | OUTPATIENT
Start: 2017-12-12 | End: 2017-12-12 | Stop reason: SDUPTHER

## 2017-12-12 RX ORDER — ONDANSETRON 8 MG/1
8 TABLET, ORALLY DISINTEGRATING ORAL EVERY 8 HOURS PRN
Status: DISCONTINUED | OUTPATIENT
Start: 2017-12-12 | End: 2017-12-12 | Stop reason: HOSPADM

## 2017-12-12 RX ORDER — SUCCINYLCHOLINE CHLORIDE 20 MG/ML
INJECTION INTRAMUSCULAR; INTRAVENOUS
Status: DISCONTINUED | OUTPATIENT
Start: 2017-12-12 | End: 2017-12-12

## 2017-12-12 RX ORDER — MIDAZOLAM HYDROCHLORIDE 1 MG/ML
INJECTION, SOLUTION INTRAMUSCULAR; INTRAVENOUS
Status: DISCONTINUED | OUTPATIENT
Start: 2017-12-12 | End: 2017-12-12

## 2017-12-12 RX ORDER — SODIUM CHLORIDE 0.9 % (FLUSH) 0.9 %
3 SYRINGE (ML) INJECTION
Status: DISCONTINUED | OUTPATIENT
Start: 2017-12-12 | End: 2017-12-12 | Stop reason: SDUPTHER

## 2017-12-12 RX ORDER — NEOSTIGMINE METHYLSULFATE 1 MG/ML
INJECTION, SOLUTION INTRAVENOUS
Status: DISCONTINUED | OUTPATIENT
Start: 2017-12-12 | End: 2017-12-12

## 2017-12-12 RX ORDER — GLYCOPYRROLATE 0.2 MG/ML
INJECTION INTRAMUSCULAR; INTRAVENOUS
Status: DISCONTINUED | OUTPATIENT
Start: 2017-12-12 | End: 2017-12-12

## 2017-12-12 RX ORDER — MEPERIDINE HYDROCHLORIDE 50 MG/ML
12.5 INJECTION INTRAMUSCULAR; INTRAVENOUS; SUBCUTANEOUS ONCE AS NEEDED
Status: DISCONTINUED | OUTPATIENT
Start: 2017-12-12 | End: 2017-12-12 | Stop reason: HOSPADM

## 2017-12-12 RX ORDER — FENTANYL CITRATE 50 UG/ML
INJECTION, SOLUTION INTRAMUSCULAR; INTRAVENOUS
Status: DISCONTINUED | OUTPATIENT
Start: 2017-12-12 | End: 2017-12-12

## 2017-12-12 RX ORDER — HYDROCODONE BITARTRATE AND ACETAMINOPHEN 10; 325 MG/1; MG/1
1 TABLET ORAL EVERY 4 HOURS PRN
Status: DISCONTINUED | OUTPATIENT
Start: 2017-12-12 | End: 2017-12-12 | Stop reason: HOSPADM

## 2017-12-12 RX ORDER — PROPOFOL 10 MG/ML
VIAL (ML) INTRAVENOUS
Status: DISCONTINUED | OUTPATIENT
Start: 2017-12-12 | End: 2017-12-12

## 2017-12-12 RX ORDER — OXYCODONE AND ACETAMINOPHEN 5; 325 MG/1; MG/1
1-2 TABLET ORAL EVERY 4 HOURS PRN
Qty: 30 TABLET | Refills: 0 | Status: SHIPPED | OUTPATIENT
Start: 2017-12-12 | End: 2017-12-27

## 2017-12-12 RX ORDER — CIPROFLOXACIN 500 MG/1
500 TABLET ORAL EVERY 12 HOURS
Qty: 6 TABLET | Refills: 0 | Status: SHIPPED | OUTPATIENT
Start: 2017-12-12 | End: 2017-12-15

## 2017-12-12 RX ORDER — SODIUM CHLORIDE 0.9 % (FLUSH) 0.9 %
3 SYRINGE (ML) INJECTION
Status: DISCONTINUED | OUTPATIENT
Start: 2017-12-12 | End: 2017-12-12 | Stop reason: HOSPADM

## 2017-12-12 RX ORDER — LIDOCAINE HCL/PF 100 MG/5ML
SYRINGE (ML) INTRAVENOUS
Status: DISCONTINUED | OUTPATIENT
Start: 2017-12-12 | End: 2017-12-12

## 2017-12-12 RX ORDER — HYDROMORPHONE HYDROCHLORIDE 1 MG/ML
0.2 INJECTION, SOLUTION INTRAMUSCULAR; INTRAVENOUS; SUBCUTANEOUS EVERY 5 MIN PRN
Status: DISCONTINUED | OUTPATIENT
Start: 2017-12-12 | End: 2017-12-12 | Stop reason: SDUPTHER

## 2017-12-12 RX ORDER — MEPERIDINE HYDROCHLORIDE 50 MG/ML
12.5 INJECTION INTRAMUSCULAR; INTRAVENOUS; SUBCUTANEOUS ONCE AS NEEDED
Status: DISCONTINUED | OUTPATIENT
Start: 2017-12-12 | End: 2017-12-12 | Stop reason: SDUPTHER

## 2017-12-12 RX ORDER — ONDANSETRON 2 MG/ML
INJECTION INTRAMUSCULAR; INTRAVENOUS
Status: DISCONTINUED | OUTPATIENT
Start: 2017-12-12 | End: 2017-12-12

## 2017-12-12 RX ORDER — DOCUSATE SODIUM 100 MG/1
100 CAPSULE, LIQUID FILLED ORAL 2 TIMES DAILY
Qty: 60 CAPSULE | Refills: 0 | Status: SHIPPED | OUTPATIENT
Start: 2017-12-12 | End: 2017-12-27

## 2017-12-12 RX ORDER — MORPHINE SULFATE 2 MG/ML
2 INJECTION, SOLUTION INTRAMUSCULAR; INTRAVENOUS EVERY 5 MIN PRN
Status: DISCONTINUED | OUTPATIENT
Start: 2017-12-12 | End: 2017-12-12 | Stop reason: HOSPADM

## 2017-12-12 RX ORDER — HYDROMORPHONE HYDROCHLORIDE 1 MG/ML
0.2 INJECTION, SOLUTION INTRAMUSCULAR; INTRAVENOUS; SUBCUTANEOUS EVERY 5 MIN PRN
Status: DISCONTINUED | OUTPATIENT
Start: 2017-12-12 | End: 2017-12-12 | Stop reason: HOSPADM

## 2017-12-12 RX ORDER — SODIUM CHLORIDE, SODIUM LACTATE, POTASSIUM CHLORIDE, CALCIUM CHLORIDE 600; 310; 30; 20 MG/100ML; MG/100ML; MG/100ML; MG/100ML
INJECTION, SOLUTION INTRAVENOUS CONTINUOUS
Status: DISCONTINUED | OUTPATIENT
Start: 2017-12-12 | End: 2017-12-12 | Stop reason: HOSPADM

## 2017-12-12 RX ADMIN — SUCCINYLCHOLINE CHLORIDE 160 MG: 20 INJECTION, SOLUTION INTRAMUSCULAR; INTRAVENOUS at 08:12

## 2017-12-12 RX ADMIN — FUROSEMIDE 10 MG: 10 INJECTION, SOLUTION INTRAMUSCULAR; INTRAVENOUS at 08:12

## 2017-12-12 RX ADMIN — NEOSTIGMINE METHYLSULFATE 5 MG: 1 INJECTION INTRAVENOUS at 08:12

## 2017-12-12 RX ADMIN — HYDROCODONE BITARTRATE AND ACETAMINOPHEN 1 TABLET: 10; 325 TABLET ORAL at 09:12

## 2017-12-12 RX ADMIN — SODIUM CHLORIDE, SODIUM LACTATE, POTASSIUM CHLORIDE, AND CALCIUM CHLORIDE: 600; 310; 30; 20 INJECTION, SOLUTION INTRAVENOUS at 08:12

## 2017-12-12 RX ADMIN — ONDANSETRON 4 MG: 2 INJECTION, SOLUTION INTRAMUSCULAR; INTRAVENOUS at 08:12

## 2017-12-12 RX ADMIN — MIDAZOLAM HYDROCHLORIDE 2 MG: 1 INJECTION, SOLUTION INTRAMUSCULAR; INTRAVENOUS at 08:12

## 2017-12-12 RX ADMIN — CEFAZOLIN SODIUM 2 G: 2 SOLUTION INTRAVENOUS at 08:12

## 2017-12-12 RX ADMIN — CEFAZOLIN 2 G: 1 INJECTION, POWDER, FOR SOLUTION INTRAMUSCULAR; INTRAVENOUS at 08:12

## 2017-12-12 RX ADMIN — FENTANYL CITRATE 100 MCG: 50 INJECTION, SOLUTION INTRAMUSCULAR; INTRAVENOUS at 08:12

## 2017-12-12 RX ADMIN — PROPOFOL 160 MG: 10 INJECTION, EMULSION INTRAVENOUS at 08:12

## 2017-12-12 RX ADMIN — ROCURONIUM BROMIDE 5 MG: 10 INJECTION, SOLUTION INTRAVENOUS at 08:12

## 2017-12-12 RX ADMIN — ROBINUL 0.8 MG: 0.2 INJECTION INTRAMUSCULAR; INTRAVENOUS at 08:12

## 2017-12-12 RX ADMIN — LIDOCAINE HYDROCHLORIDE 75 MG: 20 INJECTION, SOLUTION INTRAVENOUS at 08:12

## 2017-12-12 NOTE — ANESTHESIA PREPROCEDURE EVALUATION
12/12/2017  Delfino Monk is a 62 y.o., male.    Pre-op Assessment    I have reviewed the Patient Summary Reports.     I have reviewed the Nursing Notes.   I have reviewed the Medications.     Review of Systems  Anesthesia Hx:  No problems with previous Anesthesia    Hematology/Oncology:  Hematology Normal   Oncology Normal     EENT/Dental:EENT/Dental Normal   Cardiovascular:   Hypertension    Pulmonary:  Pulmonary Normal    Renal/:   renal calculi BPH    Hepatic/GI:  Hepatic/GI Normal    Musculoskeletal:  Musculoskeletal Normal    Neurological:  Neurology Normal    Endocrine:  Endocrine Normal        Physical Exam  General:  Well nourished    Airway/Jaw/Neck:  Airway Findings: Mouth Opening: Normal Tongue: Normal  Mallampati: II       Chest/Lungs:  Chest/Lungs Findings: Clear to auscultation     Heart/Vascular:  Heart Findings: Rate: Normal  Rhythm: Regular Rhythm             Anesthesia Plan  Type of Anesthesia, risks & benefits discussed:  Anesthesia Type:  general  Patient's Preference:   Intra-op Monitoring Plan:   Intra-op Monitoring Plan Comments:   Post Op Pain Control Plan:   Post Op Pain Control Plan Comments:   Induction:   IV  Beta Blocker:  Patient is not currently on a Beta-Blocker (No further documentation required).       Informed Consent: Patient understands risks and agrees with Anesthesia plan.  Questions answered. Anesthesia consent signed with patient.  ASA Score: 2     Day of Surgery Review of History & Physical:  There are no significant changes.

## 2017-12-12 NOTE — TRANSFER OF CARE
Anesthesia Transfer of Care Note    Patient: Delfino Monk    Procedure(s) Performed: Procedure(s) (LRB):  CYSTOSCOPY WITH STENT PLACEMENT (Right)  EXTRACTION-STONE-URETEROSCOPY (Right)    Patient location: PACU    Anesthesia Type: general    Transport from OR: Transported from OR on room air with adequate spontaneous ventilation    Post pain: adequate analgesia    Post assessment: no apparent anesthetic complications and tolerated procedure well    Post vital signs: stable    Level of consciousness: awake, alert and oriented    Nausea/Vomiting: no nausea/vomiting    Complications: none    Transfer of care protocol was followed      Last vitals:   Visit Vitals  BP (!) 143/73 (BP Location: Right arm, Patient Position: Sitting)   Pulse (!) 56   Temp 36.8 °C (98.2 °F) (Tympanic)   Resp 18   Ht 6' (1.829 m)   Wt 87.3 kg (192 lb 7.4 oz)   SpO2 96%   BMI 26.10 kg/m²

## 2017-12-12 NOTE — ANESTHESIA POSTPROCEDURE EVALUATION
Anesthesia Post Evaluation    Patient: Delfino Monk    Procedure(s) Performed: Procedure(s) (LRB):  CYSTOSCOPY WITH STENT PLACEMENT (Right)  EXTRACTION-STONE-URETEROSCOPY (Right)    Final Anesthesia Type: general  Patient location during evaluation: PACU  Patient participation: Yes- Able to Participate  Level of consciousness: awake and alert and oriented  Post-procedure vital signs: reviewed and stable  Pain management: adequate  Airway patency: patent  PONV status at discharge: No PONV  Anesthetic complications: no      Cardiovascular status: blood pressure returned to baseline  Respiratory status: unassisted  Hydration status: euvolemic  Follow-up not needed.        Visit Vitals  /85   Pulse 75   Temp 36.8 °C (98.2 °F) (Temporal)   Resp 18   Ht 6' (1.829 m)   Wt 87.3 kg (192 lb 7.4 oz)   SpO2 98%   BMI 26.10 kg/m²       Pain/Natalee Score: Pain Assessment Performed: Yes (12/12/2017 10:03 AM)  Presence of Pain: denies (12/12/2017 10:03 AM)  Pain Rating Prior to Med Admin: 3 (12/12/2017  9:30 AM)  Natalee Score: 10 (12/12/2017 10:03 AM)

## 2017-12-12 NOTE — PLAN OF CARE
Gave home care instructions to patient and father, verbalized understanding.  Changed to leg bag, gave instructions on care.  All questions answered to the satisfaction of both.  To POV via WC, into POV without difficulty, distress or assist.

## 2017-12-12 NOTE — TELEPHONE ENCOUNTER
----- Message from Bradly Green IV, MD sent at 12/12/2017  8:46 AM CST -----  RTC Monday for cysto/stent removal pls

## 2017-12-12 NOTE — DISCHARGE INSTRUCTIONS
General Information:  1.  Do not drink alcoholic beverages including beer for 24 hours or as long as you are on pain medication..  2.  Do not drive a motor vehicle, operate machinery or power tools, or signs legal papers for 24 hours or as long as you are on pain medication.   3.  You may experience light-headedness, dizziness, and sleepiness following surgery. Please do not stay alone. A responsible adult should be with you for this 24 hour period.  4.  Go home and rest.  5. Progress slowly to a normal diet unless instructed.  Otherwise, begin with liquids such as soft drinks, then soup and crackers working up to solid foods. Drink plenty of nonalcoholic fluids.  6.  Certain anesthetics and pain medications produce nausea and vomiting in certain       individuals. If nausea becomes a problem at home, call you doctor.  7. A nurse will be calling you sometime after surgery. Do not be alarmed. This is our way of finding out how you are doing.  8. Several times every hour while you are awake, take 2-3 deep breaths and cough. If you had stomach surgery hold a pillow or rolled towel firmly against your stomach before you cough. This will help with any pain the cough might cause.  9. Several times every hour while you are awake, pump and flex your feet 5-6 times and do foot circles. This will help prevent blood clots.  10.Call your doctor for severe pain, bleeding, fever, or signs or symptoms of infection (pain, swelling, redness, foul odor, drainage).  11.You can contact your doctor anytime by [phone] for the [facility] (at [facility]) or [phone] for the [facility] on [facility].   [email or web address] is another way to contact your doctor if you are an active participant online with [facility].

## 2017-12-12 NOTE — OP NOTE
Date of Procedure: 12/12/2017    PREOPERATIVE DIAGNOSIS:  Right ureteral stone.                                                                                                           POSTOPERATIVE DIAGNOSIS:  Right ureteral stone.                               PROCEDURES:                                                                  1.  Right ureteroscopy with stone basket extraction.                          2.  Cystoscopy with placement of right double-J ureteral stent.              3.  Fluoro less than 1 hour.                                                 SURGEON:  Dawit Green IV, M.D.                                          Assistants: None    Specimen: None    Prosthetics, Devices, Grafts: None    ANESTHESIA:  General endotracheal.                                           FINDINGS:  The patient had an approximately 6x10mm stone in the right UVJ which was obstructing.  Ureteroscopy was possible after placement of an appropriate guidewire. A stone basket was used, and all significant pieces were removed and brought to the back table for pathologic examination.  A 6-Swiss x 28 cm double-J ureteral stent was placed. Flouroscopy was used throughout the case for wire and scope guidance, and if applicable to check final stent placement. Staged TURP is likely after recovery from this procedure and stent removal.  Staged stent removal and voiding trial next week.                                   BLOOD LOSS:  None.                                                           BLOOD GIVEN:  None.                                                          URINE OUTPUT:  None.                                                         DRAINS:  6-Swiss x 28 cm right double-J ureteral stent.                      PROCEDURE IN DETAIL:  After proper consents were obtained, the patient was brought to the Operating Room where he was prepped and draped in normal sterile fashion and provided general endotracheal anesthesia  in dorsal lithotomy position.  A 22-Malaysian cystoscope was assembled and introduced per urethra and the bladder was rinsed and drained.  Fluoroscopy was used to evaluate stone position at the start of the case and throughout the case for wire and scope guidance.    An attempt was made to gently pass a guidewire up the ureter, and with a 5Fr catheter as a buttress this was done.  Then the cystoscope was set aside with the guidewire in place.      The semirigid ureteroscope was then brought to bear and easily passed into the ureteral orifice.  The stone was obstructing the ureter and observed just proximal to the UO.  No stones were observed proximally from there to the right UPJ.  A stone basket was used and the stone was brought to the bback table.  The ureteroscope was then set aside and the cystoscope replaced into the bladder over the wire.  A double-J ureteral stent was advanced coaxially over the wire and up to the renal pelvis, and there was an excellent curl on both ends when the wire was withdrawn, using fluoroscopic guidance.      The bladder was then rinsed and drained and stone fragments collected for pathologic examination.  After the bladder was drained, cystoscope was withdrawn and set aside.      The pt tolerated all of this well, and there were no complications.  he was awakened and transferred to Recovery in stable condition.

## 2017-12-12 NOTE — H&P (VIEW-ONLY)
"Chief Complaint: Urinary retention    HPI:   12/6/17: Was traveling and entered retention he thought he was in retention with 800ml and a soto was placed.  Had a CT after that that showed a kidney stone and was given some pain meds but has had no pain since he was at the ER.  Was using the oxybutinin when he entered retention.  Has a 8mm right UVJ stone.  11/20/17: Cysto today shows very large median lobe but also strong evidence of severe OAB with bladder spasms at 150 ml pushing water out around the scope.  11/17/17: Over last 3-4d got worsening stream, urgency, SP tenderness; last night entered retention.  Hardly anything came out when catheter was put in though (maybe 50ml).  Was having a ton of pain before that; can't say if it was back or bladder or brain.  Catheter did relieve the symptoms though.  No constipation daily BM but looser lately.  No dysuria.  Taking flomax/finasteride.  9/25/17: No problems at all, Reviewed history in detail.  PSA not done.  Two polyps on colonoscopy were assessed, not worrisome.  7/1/16: No new complaints.  Voiding well.  4/12/16: Alycia: "This 60-year-old male returns to the clinic today because of another episode of urinary retention following gallbladder surgery he had on Friday.  He was catheterized, and has been doing reasonably well with the Soto catheter.  He would prefer to get the catheter out today if that is possible.  My recommendation is to proceed with a voiding trial, and he agrees."  Voided fine  9/9/15: returns after having left IH repair with Dr. Murphy last Friday and entered retention and was discharge with a soto.  Removed today.   7/1/15: No adverse changes - satisfied. PSA approp on finasteride and he is voiding better.  6/25/14: PSA much reduced now on finasteride.  No urinary bother. Since his last visit he is voiding fine and feeling well. Good stream no dribble.  12/20/13: Saw L. Rushing recently with hesitancy nd elevated PVR while taking cold " "medications. Instructed to hold them.  Here today with PVR 47ml.  Started finasteride and has been on it.  Last May he had retention  Had elevated PSA and after the instrumentation was removed his PSA lowered appropriately.    6/10/13: Last note: "This patient was seen earlier this year for an acute onset of urinary retention secondary to laparoscopic bilateral hernia repair. He had a voiding trial but failed and had to go back to the ER that night for another catheter. Patient was started on Flomax and had no concerns for a while with another voiding trial a week later. His psa levels were elevated and he completed 4 weeks of cipro and was to have another psa level assessed. Patient presented again as a follow-up to an acute onset of urinary retention, where he presented to [facility] and a soto was placed. Patient was given Levaquin and had his soto removed. This was his second time this yr with urinary retention. He feels his stream is okay but sometimes weak; occasional splitting. No gross hematuria before the inguinal hernia surgery but has had some associated with catheterization."    Allergies:  Allergies   Allergen Reactions    Pcn [Penicillins]     Augmentin [Amoxicillin-Pot Clavulanate] Rash       Medications:  has a current medication list which includes the following prescription(s): aspirin, finasteride, olmesartan, tamsulosin, hydrocodone-acetaminophen 7.5-325mg, and ondansetron.    PMH:   has a past medical history of BPH (benign prostatic hyperplasia) and HTN (hypertension).    PSH:   has a past surgical history that includes bilateral lap inguinal hernia repair; left ankle; and TONSILLECTOMY, ADENOIDECTOMY.    FamHx: family history is not on file.    SocHx:  reports that he has never smoked. He has never used smokeless tobacco. He reports that he does not drink alcohol. His drug history is not on file.      Physical Exam:  Vitals:    12/06/17 0905   BP: (!) 142/84   Pulse: 72     General: A&Ox3, no " apparent distress, no deformities  Neck: No masses, normal thyroid  Abdomen: Soft, NT, ND; has a large reducible left inguinal hernia  Skin: The skin is warm and dry. No jaundice.  Ext: No c/c/e.  :   9/25/17: test desc rima, penis normal, HANDY normal prost 50 gm sm/symm    Labs/Studies:   PVR      6/13: 50 ml    12/20/13: 47 ml    7/1/16: 45 ml  PSA very normal now, 2.2    6/15: 1.6    Impression/Plan:   1. Likely has the right UVJ stone still.  Will keep soto until we can get URS done, and then will see if this is related to his OAB/retention story before moving toward TURP.  No oxybutinin from now forward.  2. KUB today.  Plan for right URS with laser lithotripsy.  Risks/benefits reviewed, consents on chart.  Will leave no string on stent and place soto, with plan for postop cysto/stent removal/uroflow one week after procedure.

## 2017-12-18 ENCOUNTER — OFFICE VISIT (OUTPATIENT)
Dept: UROLOGY | Facility: CLINIC | Age: 62
End: 2017-12-18
Payer: COMMERCIAL

## 2017-12-18 VITALS — BODY MASS INDEX: 26.04 KG/M2 | WEIGHT: 192 LBS

## 2017-12-18 DIAGNOSIS — N40.0 BENIGN PROSTATIC HYPERPLASIA, UNSPECIFIED WHETHER LOWER URINARY TRACT SYMPTOMS PRESENT: Primary | ICD-10-CM

## 2017-12-18 DIAGNOSIS — N20.1 URETERAL STONE: ICD-10-CM

## 2017-12-18 LAB
ANNOTATION COMMENT IMP: NORMAL
COMPN STONE: NORMAL
SPECIMEN SOURCE: NORMAL
STONE ANALYSIS IR-IMP: NORMAL

## 2017-12-18 PROCEDURE — 52310 CYSTOSCOPY AND TREATMENT: CPT | Mod: S$GLB,,, | Performed by: UROLOGY

## 2017-12-18 PROCEDURE — 96372 THER/PROPH/DIAG INJ SC/IM: CPT | Mod: 59,S$GLB,, | Performed by: UROLOGY

## 2017-12-18 PROCEDURE — 99499 UNLISTED E&M SERVICE: CPT | Mod: S$GLB,,, | Performed by: UROLOGY

## 2017-12-18 PROCEDURE — 99999 PR PBB SHADOW E&M-EST. PATIENT-LVL III: CPT | Mod: PBBFAC,,, | Performed by: UROLOGY

## 2017-12-18 RX ORDER — CEFTRIAXONE 1 G/1
1 INJECTION, POWDER, FOR SOLUTION INTRAMUSCULAR; INTRAVENOUS
Status: COMPLETED | OUTPATIENT
Start: 2017-12-18 | End: 2017-12-18

## 2017-12-18 RX ADMIN — CEFTRIAXONE 1 G: 1 INJECTION, POWDER, FOR SOLUTION INTRAMUSCULAR; INTRAVENOUS at 08:12

## 2017-12-18 NOTE — PROGRESS NOTES
"Chief Complaint: Urinary retention    HPI:   12/18/17: URS complete, stone removed.  Stent out today.  PVR reassuring, voiding trial.   12/6/17: Was traveling and entered retention he thought he was in retention with 800ml and a soto was placed.  Had a CT after that that showed a kidney stone and was given some pain meds but has had no pain since he was at the ER.  Was using the oxybutinin when he entered retention.  Has a 8mm right UVJ stone.  11/20/17: Cysto today shows very large median lobe but also strong evidence of severe OAB with bladder spasms at 150 ml pushing water out around the scope.  11/17/17: Over last 3-4d got worsening stream, urgency, SP tenderness; last night entered retention.  Hardly anything came out when catheter was put in though (maybe 50ml).  Was having a ton of pain before that; can't say if it was back or bladder or brain.  Catheter did relieve the symptoms though.  No constipation daily BM but looser lately.  No dysuria.  Taking flomax/finasteride.  9/25/17: No problems at all, Reviewed history in detail.  PSA not done.  Two polyps on colonoscopy were assessed, not worrisome.  7/1/16: No new complaints.  Voiding well.  4/12/16: Alycia: "This 60-year-old male returns to the clinic today because of another episode of urinary retention following gallbladder surgery he had on Friday.  He was catheterized, and has been doing reasonably well with the Soto catheter.  He would prefer to get the catheter out today if that is possible.  My recommendation is to proceed with a voiding trial, and he agrees."  Voided fine  9/9/15: returns after having left IH repair with Dr. Murphy last Friday and entered retention and was discharge with a soto.  Removed today.   7/1/15: No adverse changes - satisfied. PSA approp on finasteride and he is voiding better.  6/25/14: PSA much reduced now on finasteride.  No urinary bother. Since his last visit he is voiding fine and feeling well. Good stream no " "kindra.  12/20/13: Saw QUINN Gan recently with hesitancy nd elevated PVR while taking cold medications. Instructed to hold them.  Here today with PVR 47ml.  Started finasteride and has been on it.  Last May he had retention  Had elevated PSA and after the instrumentation was removed his PSA lowered appropriately.    6/10/13: Last note: "This patient was seen earlier this year for an acute onset of urinary retention secondary to laparoscopic bilateral hernia repair. He had a voiding trial but failed and had to go back to the ER that night for another catheter. Patient was started on Flomax and had no concerns for a while with another voiding trial a week later. His psa levels were elevated and he completed 4 weeks of cipro and was to have another psa level assessed. Patient presented again as a follow-up to an acute onset of urinary retention, where he presented to [facility] and a soto was placed. Patient was given Levaquin and had his soto removed. This was his second time this yr with urinary retention. He feels his stream is okay but sometimes weak; occasional splitting. No gross hematuria before the inguinal hernia surgery but has had some associated with catheterization."    Allergies:  Allergies   Allergen Reactions    Pcn [Penicillins]     Augmentin [Amoxicillin-Pot Clavulanate] Rash       Medications:  has a current medication list which includes the following prescription(s): aspirin, docusate sodium, finasteride, hydrocodone-acetaminophen 7.5-325mg, olmesartan, ondansetron, oxycodone-acetaminophen, and tamsulosin.    PMH:   has a past medical history of BPH (benign prostatic hyperplasia) and HTN (hypertension).    PSH:   has a past surgical history that includes bilateral lap inguinal hernia repair; left ankle; TONSILLECTOMY, ADENOIDECTOMY; Hernia repair; and Cholecystectomy (2016).    FamHx: family history is not on file.    SocHx:  reports that he has never smoked. He has never used smokeless tobacco. He " reports that he does not drink alcohol. His drug history is not on file.      Physical Exam:  There were no vitals filed for this visit.  General: A&Ox3, no apparent distress, no deformities  Neck: No masses, normal thyroid  Abdomen: Soft, NT, ND; has a large reducible left inguinal hernia  Skin: The skin is warm and dry. No jaundice.  Ext: No c/c/e.  :   9/25/17: test desc rima, penis normal, HANDY normal prost 50 gm sm/symm    Labs/Studies:   PVR      6/13: 50 ml    12/20/13: 47 ml    7/1/16: 45 ml  PSA very normal now, 2.2    6/15: 1.6    Procedure:  Cystoscopy with removal of foreign body - simple (ureteral stent)    Procedure in Detail: After proper consents were obtained, the patient was prepped and draped in normal sterile fashion for diagnostic cystoscopy. 5 ml of lidocaine jelly was instilled in the urethra. The flexible cystoscope was then introduced into the urethra, and advanced into the bladder under direct vision. The previously placed stent was observed and was held with graspers.  The cystoscope was then removed, and the procedure terminated. The stent was removed in its entirety.     Findings: right ureteral stent removed without difficulty    ABx: Rocephin 1 gm IM    Impression/Plan:   1. Right UVJ stone removed, stent removed. Voiding trial reassuring.  If enters retention again will move to staged TURP.

## 2017-12-18 NOTE — PROGRESS NOTES
..Using aseptic technique, Rocephin 1 gm adm to right ventrogluteal as per written order of Dr. Green.  Pt tolerated procedure well; remained in room for 20 minutes to observe for side effects; none noted. Left clinic per self without difficulty.

## 2017-12-27 ENCOUNTER — OFFICE VISIT (OUTPATIENT)
Dept: UROLOGY | Facility: CLINIC | Age: 62
End: 2017-12-27
Payer: COMMERCIAL

## 2017-12-27 VITALS
SYSTOLIC BLOOD PRESSURE: 138 MMHG | BODY MASS INDEX: 26.67 KG/M2 | HEART RATE: 55 BPM | DIASTOLIC BLOOD PRESSURE: 79 MMHG | WEIGHT: 196.63 LBS

## 2017-12-27 VITALS
WEIGHT: 192.44 LBS | HEART RATE: 75 BPM | HEIGHT: 72 IN | RESPIRATION RATE: 18 BRPM | BODY MASS INDEX: 26.07 KG/M2 | TEMPERATURE: 98 F | OXYGEN SATURATION: 98 % | DIASTOLIC BLOOD PRESSURE: 85 MMHG | SYSTOLIC BLOOD PRESSURE: 138 MMHG

## 2017-12-27 DIAGNOSIS — N20.1 URETERAL STONE: ICD-10-CM

## 2017-12-27 DIAGNOSIS — R33.9 RETENTION OF URINE: Primary | ICD-10-CM

## 2017-12-27 LAB
BILIRUB SERPL-MCNC: NORMAL MG/DL
BLOOD URINE, POC: NORMAL
COLOR, POC UA: NORMAL
GLUCOSE UR QL STRIP: NORMAL
KETONES UR QL STRIP: NORMAL
LEUKOCYTE ESTERASE URINE, POC: NORMAL
NITRITE, POC UA: NORMAL
PH, POC UA: 5
POC RESIDUAL URINE VOLUME: 55 ML (ref 0–100)
PROTEIN, POC: NORMAL
SPECIFIC GRAVITY, POC UA: 1.01
UROBILINOGEN, POC UA: NORMAL

## 2017-12-27 PROCEDURE — 51798 US URINE CAPACITY MEASURE: CPT | Mod: S$GLB,,, | Performed by: NURSE PRACTITIONER

## 2017-12-27 PROCEDURE — 99213 OFFICE O/P EST LOW 20 MIN: CPT | Mod: 25,S$GLB,, | Performed by: NURSE PRACTITIONER

## 2017-12-27 PROCEDURE — 81002 URINALYSIS NONAUTO W/O SCOPE: CPT | Mod: S$GLB,,, | Performed by: NURSE PRACTITIONER

## 2017-12-27 PROCEDURE — 99999 PR PBB SHADOW E&M-EST. PATIENT-LVL III: CPT | Mod: PBBFAC,,, | Performed by: NURSE PRACTITIONER

## 2017-12-27 NOTE — PROGRESS NOTES
"Chief Complaint: Urinary retention    HPI:   12/27/17: Laws: Doing well since stent removal last week; feels like he is emptying well. Soto has been out a week. No gross hematuria. No pain at all. Taking Flomax and Finasteride. Says he drinks a lot of water and does not eat a high salt diet.   12/18/17: URS complete, stone removed.  Stent out today.  PVR reassuring, voiding trial.   12/6/17: Was traveling and entered retention he thought he was in retention with 800ml and a soto was placed.  Had a CT after that that showed a kidney stone and was given some pain meds but has had no pain since he was at the ER.  Was using the oxybutinin when he entered retention.  Has a 8mm right UVJ stone.  11/20/17: Cysto today shows very large median lobe but also strong evidence of severe OAB with bladder spasms at 150 ml pushing water out around the scope.  11/17/17: Over last 3-4d got worsening stream, urgency, SP tenderness; last night entered retention.  Hardly anything came out when catheter was put in though (maybe 50ml).  Was having a ton of pain before that; can't say if it was back or bladder or brain.  Catheter did relieve the symptoms though.  No constipation daily BM but looser lately.  No dysuria.  Taking flomax/finasteride.  9/25/17: No problems at all, Reviewed history in detail.  PSA not done.  Two polyps on colonoscopy were assessed, not worrisome.  7/1/16: No new complaints.  Voiding well.  4/12/16: Alycia: "This 60-year-old male returns to the clinic today because of another episode of urinary retention following gallbladder surgery he had on Friday.  He was catheterized, and has been doing reasonably well with the Soto catheter.  He would prefer to get the catheter out today if that is possible.  My recommendation is to proceed with a voiding trial, and he agrees."  Voided fine  9/9/15: returns after having left IH repair with Dr. Murphy last Friday and entered retention and was discharge with a soto.  " "Removed today.   7/1/15: No adverse changes - satisfied. PSA approp on finasteride and he is voiding better.  6/25/14: PSA much reduced now on finasteride.  No urinary bother. Since his last visit he is voiding fine and feeling well. Good stream no dribble.  12/20/13: Saw QUINN Gan recently with hesitancy nd elevated PVR while taking cold medications. Instructed to hold them.  Here today with PVR 47ml.  Started finasteride and has been on it.  Last May he had retention  Had elevated PSA and after the instrumentation was removed his PSA lowered appropriately.    6/10/13: Last note: "This patient was seen earlier this year for an acute onset of urinary retention secondary to laparoscopic bilateral hernia repair. He had a voiding trial but failed and had to go back to the ER that night for another catheter. Patient was started on Flomax and had no concerns for a while with another voiding trial a week later. His psa levels were elevated and he completed 4 weeks of cipro and was to have another psa level assessed. Patient presented again as a follow-up to an acute onset of urinary retention, where he presented to [facility] and a soto was placed. Patient was given Levaquin and had his soto removed. This was his second time this yr with urinary retention. He feels his stream is okay but sometimes weak; occasional splitting. No gross hematuria before the inguinal hernia surgery but has had some associated with catheterization."    Allergies:  Allergies   Allergen Reactions    Pcn [Penicillins]     Augmentin [Amoxicillin-Pot Clavulanate] Rash       Medications:  has a current medication list which includes the following prescription(s): aspirin, finasteride, olmesartan, and tamsulosin.    PMH:   has a past medical history of BPH (benign prostatic hyperplasia) and HTN (hypertension).    PSH:   has a past surgical history that includes bilateral lap inguinal hernia repair; left ankle; TONSILLECTOMY, ADENOIDECTOMY; Hernia " repair; and Cholecystectomy (2016).    FamHx: family history is not on file.    SocHx:  reports that he has never smoked. He has never used smokeless tobacco. He reports that he does not drink alcohol. His drug history is not on file.      Physical Exam:  Vitals:    12/27/17 0828   BP: 138/79   Pulse: (!) 55     General: A&Ox3, no apparent distress, no deformities  Neck: No masses, normal thyroid  Abdomen: Soft, NT, ND; has a large reducible left inguinal hernia  Skin: The skin is warm and dry. No jaundice.  Ext: No c/c/e.  :   12/17/17: deferred (HANDY 9/17)    Labs/Studies:   Urinalysis performed in clinic, summary: UA normal  PVR 12/17/17: 191 -> voided then 55 ml       PSA very normal now, 2.2    6/15: 1.6    9/17: 1.6    Impression/Plan:   1. Emptying well  2. Discussed stone prevention; preventing dehydration/low salt diet.  3. RTC 1 month for PVR

## 2018-01-29 ENCOUNTER — OFFICE VISIT (OUTPATIENT)
Dept: UROLOGY | Facility: CLINIC | Age: 63
End: 2018-01-29
Payer: COMMERCIAL

## 2018-01-29 VITALS
DIASTOLIC BLOOD PRESSURE: 72 MMHG | SYSTOLIC BLOOD PRESSURE: 119 MMHG | WEIGHT: 194.44 LBS | HEIGHT: 72 IN | BODY MASS INDEX: 26.34 KG/M2 | HEART RATE: 54 BPM

## 2018-01-29 DIAGNOSIS — N20.1 URETERAL STONE: Primary | ICD-10-CM

## 2018-01-29 LAB
BILIRUB SERPL-MCNC: NORMAL MG/DL
BLOOD URINE, POC: NORMAL
COLOR, POC UA: YELLOW
GLUCOSE UR QL STRIP: NORMAL
KETONES UR QL STRIP: NORMAL
LEUKOCYTE ESTERASE URINE, POC: NORMAL
NITRITE, POC UA: NORMAL
PH, POC UA: 5
POC RESIDUAL URINE VOLUME: NORMAL ML (ref 0–100)
PROTEIN, POC: NORMAL
SPECIFIC GRAVITY, POC UA: 1.02
UROBILINOGEN, POC UA: NORMAL

## 2018-01-29 PROCEDURE — 99999 PR PBB SHADOW E&M-EST. PATIENT-LVL III: CPT | Mod: PBBFAC,,, | Performed by: NURSE PRACTITIONER

## 2018-01-29 PROCEDURE — 99213 OFFICE O/P EST LOW 20 MIN: CPT | Mod: 25,S$GLB,, | Performed by: NURSE PRACTITIONER

## 2018-01-29 PROCEDURE — 81002 URINALYSIS NONAUTO W/O SCOPE: CPT | Mod: S$GLB,,, | Performed by: NURSE PRACTITIONER

## 2018-01-29 PROCEDURE — 51798 US URINE CAPACITY MEASURE: CPT | Mod: S$GLB,,, | Performed by: NURSE PRACTITIONER

## 2018-01-29 NOTE — PROGRESS NOTES
"Chief Complaint: Urinary retention    HPI:   1/29/18: Laws: One month PVR recheck. Feeling good; voiding back to normal. Good stream. Taking Flomax and Finasteride. No gross hematuria. No SP pain or dysuria.   12/27/17: Laws: Doing well since stent removal last week; feels like he is emptying well. Soto has been out a week. No gross hematuria. No pain at all. Taking Flomax and Finasteride. Says he drinks a lot of water and does not eat a high salt diet.   12/18/17: URS complete, stone removed.  Stent out today.  PVR reassuring, voiding trial.   12/6/17: Was traveling and entered retention he thought he was in retention with 800ml and a soto was placed.  Had a CT after that that showed a kidney stone and was given some pain meds but has had no pain since he was at the ER.  Was using the oxybutinin when he entered retention.  Has a 8mm right UVJ stone.  11/20/17: Cysto today shows very large median lobe but also strong evidence of severe OAB with bladder spasms at 150 ml pushing water out around the scope.  11/17/17: Over last 3-4d got worsening stream, urgency, SP tenderness; last night entered retention.  Hardly anything came out when catheter was put in though (maybe 50ml).  Was having a ton of pain before that; can't say if it was back or bladder or brain.  Catheter did relieve the symptoms though.  No constipation daily BM but looser lately.  No dysuria.  Taking flomax/finasteride.  9/25/17: No problems at all, Reviewed history in detail.  PSA not done.  Two polyps on colonoscopy were assessed, not worrisome.  7/1/16: No new complaints.  Voiding well.  4/12/16: Alycia: "This 60-year-old male returns to the clinic today because of another episode of urinary retention following gallbladder surgery he had on Friday.  He was catheterized, and has been doing reasonably well with the Soto catheter.  He would prefer to get the catheter out today if that is possible.  My recommendation is to proceed with a voiding " "trial, and he agrees."  Voided fine  9/9/15: returns after having left IH repair with Dr. Murphy last Friday and entered retention and was discharge with a soto.  Removed today.   7/1/15: No adverse changes - satisfied. PSA approp on finasteride and he is voiding better.  6/25/14: PSA much reduced now on finasteride.  No urinary bother. Since his last visit he is voiding fine and feeling well. Good stream no dribble.  12/20/13: Saw QUINN Mathewhisanket recently with hesitancy nd elevated PVR while taking cold medications. Instructed to hold them.  Here today with PVR 47ml.  Started finasteride and has been on it.  Last May he had retention  Had elevated PSA and after the instrumentation was removed his PSA lowered appropriately.    6/10/13: Last note: "This patient was seen earlier this year for an acute onset of urinary retention secondary to laparoscopic bilateral hernia repair. He had a voiding trial but failed and had to go back to the ER that night for another catheter. Patient was started on Flomax and had no concerns for a while with another voiding trial a week later. His psa levels were elevated and he completed 4 weeks of cipro and was to have another psa level assessed. Patient presented again as a follow-up to an acute onset of urinary retention, where he presented to [facility] and a soto was placed. Patient was given Levaquin and had his soto removed. This was his second time this yr with urinary retention. He feels his stream is okay but sometimes weak; occasional splitting. No gross hematuria before the inguinal hernia surgery but has had some associated with catheterization."    Allergies:  Allergies   Allergen Reactions    Pcn [Penicillins]     Augmentin [Amoxicillin-Pot Clavulanate] Rash       Medications:  has a current medication list which includes the following prescription(s): aspirin, finasteride, olmesartan, and tamsulosin.    PMH:   has a past medical history of BPH (benign prostatic hyperplasia) " and HTN (hypertension).    PSH:   has a past surgical history that includes bilateral lap inguinal hernia repair; left ankle; TONSILLECTOMY, ADENOIDECTOMY; Hernia repair; and Cholecystectomy (2016).    FamHx: family history is not on file.    SocHx:  reports that he has never smoked. He has never used smokeless tobacco. He reports that he does not drink alcohol. His drug history is not on file.      Physical Exam:  Vitals:    01/29/18 0835   BP: 119/72   Pulse: (!) 54     General: A&Ox3, no apparent distress, no deformities  Neck: No masses, normal thyroid  Abdomen: Soft, NT, ND; has a large reducible left inguinal hernia  Skin: The skin is warm and dry. No jaundice.  Ext: No c/c/e.  :   12/17/17: deferred (HANDY 9/17)    Labs/Studies:   Urinalysis performed in clinic, summary: UA normal  PVR 12/17/17: 191 -> voided then 55 ml  PVR 1/29/18: 143 -> voided then 67 ml       PSA very normal now, 2.2    6/15: 1.6    9/17: 1.6    Impression/Plan:   1. Emptying well  2. KUB/US in 6 months; RTC 6 months

## 2018-07-27 ENCOUNTER — TELEPHONE (OUTPATIENT)
Dept: RADIOLOGY | Facility: HOSPITAL | Age: 63
End: 2018-07-27

## 2018-07-30 ENCOUNTER — HOSPITAL ENCOUNTER (OUTPATIENT)
Dept: RADIOLOGY | Facility: HOSPITAL | Age: 63
Discharge: HOME | End: 2018-07-30
Attending: NURSE PRACTITIONER
Payer: COMMERCIAL

## 2018-07-30 DIAGNOSIS — N20.1 URETERAL STONE: ICD-10-CM

## 2018-07-30 PROCEDURE — 76770 US EXAM ABDO BACK WALL COMP: CPT | Mod: TC

## 2018-07-30 PROCEDURE — 76770 US EXAM ABDO BACK WALL COMP: CPT | Mod: 26,,, | Performed by: RADIOLOGY

## 2018-07-30 PROCEDURE — 74018 RADEX ABDOMEN 1 VIEW: CPT | Mod: TC

## 2018-07-30 PROCEDURE — 74018 RADEX ABDOMEN 1 VIEW: CPT | Mod: 26,,, | Performed by: RADIOLOGY

## 2018-08-01 ENCOUNTER — TELEPHONE (OUTPATIENT)
Dept: UROLOGY | Facility: CLINIC | Age: 63
End: 2018-08-01

## 2018-08-01 NOTE — TELEPHONE ENCOUNTER
----- Message from Ynes Candelario sent at 8/1/2018  9:01 AM CDT -----  Contact: Pt   Pt called in regards to the appointment was canceled need to know why..[phone] (home) [phone] (work)

## 2018-09-25 ENCOUNTER — LAB VISIT (OUTPATIENT)
Dept: LAB | Facility: HOSPITAL | Age: 63
End: 2018-09-25
Attending: UROLOGY
Payer: COMMERCIAL

## 2018-09-25 DIAGNOSIS — N40.0 BENIGN PROSTATIC HYPERPLASIA, PRESENCE OF LOWER URINARY TRACT SYMPTOMS UNSPECIFIED: ICD-10-CM

## 2018-09-25 LAB — COMPLEXED PSA SERPL-MCNC: 1.3 NG/ML

## 2018-09-25 PROCEDURE — 84153 ASSAY OF PSA TOTAL: CPT

## 2018-09-25 PROCEDURE — 36415 COLL VENOUS BLD VENIPUNCTURE: CPT

## 2018-10-01 ENCOUNTER — OFFICE VISIT (OUTPATIENT)
Dept: UROLOGY | Facility: CLINIC | Age: 63
End: 2018-10-01
Payer: COMMERCIAL

## 2018-10-01 VITALS
WEIGHT: 201.94 LBS | HEIGHT: 72 IN | HEART RATE: 47 BPM | BODY MASS INDEX: 27.35 KG/M2 | SYSTOLIC BLOOD PRESSURE: 132 MMHG | DIASTOLIC BLOOD PRESSURE: 68 MMHG

## 2018-10-01 DIAGNOSIS — N40.0 BENIGN PROSTATIC HYPERPLASIA, UNSPECIFIED WHETHER LOWER URINARY TRACT SYMPTOMS PRESENT: ICD-10-CM

## 2018-10-01 DIAGNOSIS — N20.0 RENAL STONE: Primary | ICD-10-CM

## 2018-10-01 LAB
BILIRUB SERPL-MCNC: NORMAL MG/DL
BLOOD URINE, POC: NORMAL
COLOR, POC UA: YELLOW
GLUCOSE UR QL STRIP: NORMAL
KETONES UR QL STRIP: NORMAL
LEUKOCYTE ESTERASE URINE, POC: NORMAL
NITRITE, POC UA: NORMAL
PH, POC UA: 5
PROTEIN, POC: NORMAL
SPECIFIC GRAVITY, POC UA: 1.02
UROBILINOGEN, POC UA: NORMAL

## 2018-10-01 PROCEDURE — 99999 PR PBB SHADOW E&M-EST. PATIENT-LVL III: CPT | Mod: PBBFAC,,, | Performed by: UROLOGY

## 2018-10-01 PROCEDURE — 99214 OFFICE O/P EST MOD 30 MIN: CPT | Mod: 25,S$GLB,, | Performed by: UROLOGY

## 2018-10-01 PROCEDURE — 3078F DIAST BP <80 MM HG: CPT | Mod: CPTII,S$GLB,, | Performed by: UROLOGY

## 2018-10-01 PROCEDURE — 3008F BODY MASS INDEX DOCD: CPT | Mod: CPTII,S$GLB,, | Performed by: UROLOGY

## 2018-10-01 PROCEDURE — 81002 URINALYSIS NONAUTO W/O SCOPE: CPT | Mod: S$GLB,,, | Performed by: UROLOGY

## 2018-10-01 PROCEDURE — 3075F SYST BP GE 130 - 139MM HG: CPT | Mod: CPTII,S$GLB,, | Performed by: UROLOGY

## 2018-10-01 RX ORDER — TAMSULOSIN HYDROCHLORIDE 0.4 MG/1
0.4 CAPSULE ORAL DAILY
Qty: 30 CAPSULE | Refills: 11 | Status: SHIPPED | OUTPATIENT
Start: 2018-10-01 | End: 2018-10-01 | Stop reason: SDUPTHER

## 2018-10-01 RX ORDER — FINASTERIDE 5 MG/1
5 TABLET, FILM COATED ORAL DAILY
Qty: 30 TABLET | Refills: 11 | Status: SHIPPED | OUTPATIENT
Start: 2018-10-01 | End: 2019-08-05 | Stop reason: SDUPTHER

## 2018-10-01 RX ORDER — TAMSULOSIN HYDROCHLORIDE 0.4 MG/1
0.4 CAPSULE ORAL DAILY
Qty: 30 CAPSULE | Refills: 11 | Status: SHIPPED | OUTPATIENT
Start: 2018-10-01 | End: 2019-08-05 | Stop reason: SDUPTHER

## 2018-10-01 NOTE — PROGRESS NOTES
"Chief Complaint: BPH/Stones    HPI:   10/1/18: KUB/US 7/18 reassuring.  Voiding fine.  1/29/18: Laws: One month PVR recheck. Feeling good; voiding back to normal. Good stream. Taking Flomax and Finasteride. No gross hematuria. No SP pain or dysuria.   12/27/17: Laws: Doing well since stent removal last week; feels like he is emptying well. Soto has been out a week. No gross hematuria. No pain at all. Taking Flomax and Finasteride. Says he drinks a lot of water and does not eat a high salt diet.   12/18/17: URS complete, stone removed.  Stent out today.  PVR reassuring, voiding trial.   12/6/17: Was traveling and entered retention he thought he was in retention with 800ml and a soto was placed.  Had a CT after that that showed a kidney stone and was given some pain meds but has had no pain since he was at the ER.  Was using the oxybutinin when he entered retention.  Has a 8mm right UVJ stone.  11/20/17: Cysto today shows very large median lobe but also strong evidence of severe OAB with bladder spasms at 150 ml pushing water out around the scope.  11/17/17: Over last 3-4d got worsening stream, urgency, SP tenderness; last night entered retention.  Hardly anything came out when catheter was put in though (maybe 50ml).  Was having a ton of pain before that; can't say if it was back or bladder or brain.  Catheter did relieve the symptoms though.  No constipation daily BM but looser lately.  No dysuria.  Taking flomax/finasteride.  9/25/17: No problems at all, Reviewed history in detail.  PSA not done.  Two polyps on colonoscopy were assessed, not worrisome.  7/1/16: No new complaints.  Voiding well.  4/12/16: Alycia: "This 60-year-old male returns to the clinic today because of another episode of urinary retention following gallbladder surgery he had on Friday.  He was catheterized, and has been doing reasonably well with the Soto catheter.  He would prefer to get the catheter out today if that is possible.  My " "recommendation is to proceed with a voiding trial, and he agrees."  Voided fine  9/9/15: returns after having left IH repair with Dr. Murphy last Friday and entered retention and was discharge with a soto.  Removed today.   7/1/15: No adverse changes - satisfied. PSA approp on finasteride and he is voiding better.  6/25/14: PSA much reduced now on finasteride.  No urinary bother. Since his last visit he is voiding fine and feeling well. Good stream no dribble.  12/20/13: Saw QUINN Gan recently with hesitancy nd elevated PVR while taking cold medications. Instructed to hold them.  Here today with PVR 47ml.  Started finasteride and has been on it.  Last May he had retention  Had elevated PSA and after the instrumentation was removed his PSA lowered appropriately.    6/10/13: Last note: "This patient was seen earlier this year for an acute onset of urinary retention secondary to laparoscopic bilateral hernia repair. He had a voiding trial but failed and had to go back to the ER that night for another catheter. Patient was started on Flomax and had no concerns for a while with another voiding trial a week later. His psa levels were elevated and he completed 4 weeks of cipro and was to have another psa level assessed. Patient presented again as a follow-up to an acute onset of urinary retention, where he presented to [facility] and a soto was placed. Patient was given Levaquin and had his soto removed. This was his second time this yr with urinary retention. He feels his stream is okay but sometimes weak; occasional splitting. No gross hematuria before the inguinal hernia surgery but has had some associated with catheterization."    Allergies:  Allergies   Allergen Reactions    Pcn [Penicillins]     Augmentin [Amoxicillin-Pot Clavulanate] Rash       Medications:  has a current medication list which includes the following prescription(s): aspirin, finasteride, olmesartan, and tamsulosin.    PMH:   has a past medical " history of BPH (benign prostatic hyperplasia) and HTN (hypertension).    PSH:   has a past surgical history that includes bilateral lap inguinal hernia repair; left ankle; TONSILLECTOMY, ADENOIDECTOMY; Hernia repair; Cholecystectomy (2016); CYSTOSCOPY WITH STENT PLACEMENT (Right, 12/12/2017); EXTRACTION-STONE-URETEROSCOPY (Right, 12/12/2017); and REPAIR-HERNIA-INGUINAL RECURRENT (Left, 9/4/2015).    FamHx: family history is not on file.    SocHx:  reports that  has never smoked. he has never used smokeless tobacco. He reports that he does not drink alcohol. His drug history is not on file.      Physical Exam:  Vitals:    10/01/18 1052   BP: 132/68   Pulse: (!) 47     General: A&Ox3, no apparent distress, no deformities  Neck: No masses, normal thyroid  Abdomen: Soft, NT, ND; has a large reducible left inguinal hernia  Skin: The skin is warm and dry. No jaundice.  Ext: No c/c/e.  :   12/17/17: deferred (HANDY 9/17)    Labs/Studies:   Urinalysis performed in clinic, summary: UA normal  PVR 12/17/17: 191 -> voided then 55 ml  PVR 1/29/18: 143 -> voided then 67 ml       PSA very normal now, 2.2    6/15: 1.6    9/17: 1.6    9/18: 1.3    Impression/Plan:   1. Emptying well  2. KUB/US/PSA in 1 year.  Refills.

## 2019-01-25 ENCOUNTER — HOSPITAL ENCOUNTER (INPATIENT)
Facility: HOSPITAL | Age: 64
LOS: 2 days | Discharge: HOME | DRG: 683 | End: 2019-01-27
Attending: EMERGENCY MEDICINE | Admitting: FAMILY MEDICINE
Payer: COMMERCIAL

## 2019-01-25 DIAGNOSIS — A08.4 VIRAL GASTROENTERITIS: ICD-10-CM

## 2019-01-25 DIAGNOSIS — N20.0 NEPHROLITHIASIS: ICD-10-CM

## 2019-01-25 DIAGNOSIS — E86.0 DEHYDRATION: ICD-10-CM

## 2019-01-25 DIAGNOSIS — N17.9 AKI (ACUTE KIDNEY INJURY): ICD-10-CM

## 2019-01-25 DIAGNOSIS — K56.600 PARTIAL INTESTINAL OBSTRUCTION, UNSPECIFIED CAUSE: ICD-10-CM

## 2019-01-25 DIAGNOSIS — N17.9 AKI (ACUTE KIDNEY INJURY): Primary | ICD-10-CM

## 2019-01-25 DIAGNOSIS — K52.9 ACUTE GASTROENTERITIS: ICD-10-CM

## 2019-01-25 DIAGNOSIS — K52.9 ENTERITIS: ICD-10-CM

## 2019-01-25 DIAGNOSIS — R19.7 DIARRHEA OF PRESUMED INFECTIOUS ORIGIN: ICD-10-CM

## 2019-01-25 LAB
ALBUMIN SERPL BCP-MCNC: 4.2 G/DL
ALP SERPL-CCNC: 61 U/L
ALT SERPL W/O P-5'-P-CCNC: 40 U/L
ANION GAP SERPL CALC-SCNC: 19 MMOL/L
AST SERPL-CCNC: 25 U/L
BACTERIA #/AREA URNS AUTO: ABNORMAL /HPF
BASOPHILS # BLD AUTO: 0.05 K/UL
BASOPHILS NFR BLD: 0.5 %
BILIRUB SERPL-MCNC: 0.9 MG/DL
BILIRUB UR QL STRIP: NEGATIVE
BUN SERPL-MCNC: 85 MG/DL
CALCIUM SERPL-MCNC: 9 MG/DL
CHLORIDE SERPL-SCNC: 95 MMOL/L
CK SERPL-CCNC: 121 U/L
CLARITY UR REFRACT.AUTO: ABNORMAL
CO2 SERPL-SCNC: 17 MMOL/L
COLOR UR AUTO: YELLOW
CREAT SERPL-MCNC: 8.4 MG/DL
DIFFERENTIAL METHOD: ABNORMAL
EOSINOPHIL # BLD AUTO: 0 K/UL
EOSINOPHIL NFR BLD: 0.1 %
ERYTHROCYTE [DISTWIDTH] IN BLOOD BY AUTOMATED COUNT: 12.7 %
EST. GFR  (AFRICAN AMERICAN): 7 ML/MIN/1.73 M^2
EST. GFR  (NON AFRICAN AMERICAN): 6 ML/MIN/1.73 M^2
GLUCOSE SERPL-MCNC: 172 MG/DL
GLUCOSE UR QL STRIP: NEGATIVE
HCT VFR BLD AUTO: 46.1 %
HGB BLD-MCNC: 16.4 G/DL
HGB UR QL STRIP: ABNORMAL
HYALINE CASTS UR QL AUTO: 3 /LPF
KETONES UR QL STRIP: NEGATIVE
LEUKOCYTE ESTERASE UR QL STRIP: NEGATIVE
LIPASE SERPL-CCNC: 60 U/L
LYMPHOCYTES # BLD AUTO: 1.8 K/UL
LYMPHOCYTES NFR BLD: 16.6 %
MCH RBC QN AUTO: 31.4 PG
MCHC RBC AUTO-ENTMCNC: 35.6 G/DL
MCV RBC AUTO: 88 FL
MICROSCOPIC COMMENT: ABNORMAL
MONOCYTES # BLD AUTO: 1.6 K/UL
MONOCYTES NFR BLD: 14.8 %
NEUTROPHILS # BLD AUTO: 7.5 K/UL
NEUTROPHILS NFR BLD: 68 %
NITRITE UR QL STRIP: NEGATIVE
PH UR STRIP: 5 [PH] (ref 5–8)
PLATELET # BLD AUTO: 230 K/UL
PMV BLD AUTO: 10.7 FL
POTASSIUM SERPL-SCNC: 3.6 MMOL/L
PROT SERPL-MCNC: 8.1 G/DL
PROT UR QL STRIP: ABNORMAL
RBC # BLD AUTO: 5.22 M/UL
RBC #/AREA URNS AUTO: 2 /HPF (ref 0–4)
SODIUM SERPL-SCNC: 131 MMOL/L
SP GR UR STRIP: 1.02 (ref 1–1.03)
URN SPEC COLLECT METH UR: ABNORMAL
UROBILINOGEN UR STRIP-ACNC: NEGATIVE EU/DL
WBC # BLD AUTO: 11.08 K/UL
WBC #/AREA URNS AUTO: 3 /HPF (ref 0–5)

## 2019-01-25 PROCEDURE — 80053 COMPREHEN METABOLIC PANEL: CPT | Mod: ER

## 2019-01-25 PROCEDURE — 81000 URINALYSIS NONAUTO W/SCOPE: CPT | Mod: ER

## 2019-01-25 PROCEDURE — 96361 HYDRATE IV INFUSION ADD-ON: CPT | Mod: ER

## 2019-01-25 PROCEDURE — 99285 EMERGENCY DEPT VISIT HI MDM: CPT | Mod: 25,ER

## 2019-01-25 PROCEDURE — 25500020 PHARM REV CODE 255: Mod: ER | Performed by: EMERGENCY MEDICINE

## 2019-01-25 PROCEDURE — 85025 COMPLETE CBC W/AUTO DIFF WBC: CPT | Mod: ER

## 2019-01-25 PROCEDURE — 25000003 PHARM REV CODE 250: Mod: ER | Performed by: NURSE PRACTITIONER

## 2019-01-25 PROCEDURE — 82550 ASSAY OF CK (CPK): CPT | Mod: ER

## 2019-01-25 PROCEDURE — 25000003 PHARM REV CODE 250: Mod: ER | Performed by: EMERGENCY MEDICINE

## 2019-01-25 PROCEDURE — 11000001 HC ACUTE MED/SURG PRIVATE ROOM

## 2019-01-25 PROCEDURE — 83690 ASSAY OF LIPASE: CPT | Mod: ER

## 2019-01-25 PROCEDURE — 21400001 HC TELEMETRY ROOM

## 2019-01-25 PROCEDURE — 96360 HYDRATION IV INFUSION INIT: CPT | Mod: ER

## 2019-01-25 PROCEDURE — 99223 PR INITIAL HOSPITAL CARE,LEVL III: ICD-10-PCS | Mod: ,,, | Performed by: INTERNAL MEDICINE

## 2019-01-25 PROCEDURE — 99223 1ST HOSP IP/OBS HIGH 75: CPT | Mod: ,,, | Performed by: INTERNAL MEDICINE

## 2019-01-25 RX ORDER — SODIUM CHLORIDE 0.9 % (FLUSH) 0.9 %
5 SYRINGE (ML) INJECTION
Status: DISCONTINUED | OUTPATIENT
Start: 2019-01-25 | End: 2019-01-27 | Stop reason: HOSPADM

## 2019-01-25 RX ORDER — SODIUM CHLORIDE 9 MG/ML
INJECTION, SOLUTION INTRAVENOUS CONTINUOUS
Status: DISCONTINUED | OUTPATIENT
Start: 2019-01-25 | End: 2019-01-27 | Stop reason: HOSPADM

## 2019-01-25 RX ADMIN — IOHEXOL 30 ML: 350 INJECTION, SOLUTION INTRAVENOUS at 12:01

## 2019-01-25 RX ADMIN — SODIUM CHLORIDE 1000 ML: 0.9 INJECTION, SOLUTION INTRAVENOUS at 09:01

## 2019-01-25 RX ADMIN — SODIUM CHLORIDE 1000 ML: 0.9 INJECTION, SOLUTION INTRAVENOUS at 10:01

## 2019-01-25 RX ADMIN — SODIUM CHLORIDE: 0.9 INJECTION, SOLUTION INTRAVENOUS at 03:01

## 2019-01-25 NOTE — ED NOTES
Pt c/o fatigue and generalized weakness. Pt reports having significant amount of diarrhea (greater than 36 per pt) from Monday to Wednesday, and has been unable to eat anything since. According to spouse, pt had a seizure-like episode this morning while attempting to answer the phone from the PCP office. She said that he had the phone in his hand, fell against the wall, and started shaking for a couple of seconds. She said she grabbed a chair to place behind him so he could sit down before he fell to the ground. Denies falling or hitting head. Pt reports remembering getting up out of the chair to answer phone, and felt dizzy, but did not remember episode. Reports generalized abdominal pain, rated a 2/10 at this time.     Level of Consciousness: Patient is awake, alert, oriented to person, place, time, and situation.    Appearance: Pt resting comfortably in stretcher, no acute distress at this time. Clothing appropriately placed and clean. Hygiene is appropriate.   Skin: Skin is warm, dry, and intact. Skin turgor is normal/elastic. Mucous membranes moist. Skin color is normal for ethnicity. No skin breakdown noted.  Musculoskeletal: Moves all extremities well. Full active ROM. No deformities noted. Generalized weakness. Gait steady, ambulates without use of assistive devices.   Respiratory: Airway open and patent. Respirations equal and unlabored. Breath sounds clear to auscultation. Denies any SOB.   Cardiac: Regular rate and rhythm. No peripheral edema noted. Radial and pedal pulses present and normal. Capillary refill is within normal limits. Denies chest pain.    GI: Abdomen soft, non-tender to all quadrants with palpation. Bowel sounds hypoactive in all quads. Abdomen symmetric with no distention noted. Denies any N/V. Denies diarrhea since Wednesday.    Neurological: Symmetrical expressions noted to face. No obvious neurological deficits noted. +Dizziness with standing and turning head.   Psychosocial: Speech  spontaneous, clear, and coherent. Appropriate to situation. Family at bedside. Pt is calm and cooperative.     Pt informed of plan of care, verbalizes understanding, and denies any other questions, complaints, or concerns at this time. Bed in locked in lowest position, siderails up x2, call light within reach. Pt placed on cardiac monitor, blood pressure cuff and continuous pulse ox. Will continue to monitor.

## 2019-01-25 NOTE — ED NOTES
"Pt lying in stretcher, NAD. Resp e/u. Spouse at bedside. Pt reports feeling much better, and feels like his voice is even coming back. Wife states, "Yes, he's even starting to give me orders again so he's starting to feel better."   "

## 2019-01-25 NOTE — ED PROVIDER NOTES
Encounter Date: 1/25/2019       History     Chief Complaint   Patient presents with    Diarrhea     Since Monday. Fatigue.      The history is provided by the patient.   Diarrhea    This is a new problem. The current episode started several days ago (5 days). The problem occurs 5 to 10 times per day. The problem has been resolved (no diarrhea today but unable to void in 2 days). The stool consistency is described as watery. The patient states that diarrhea awakens him from sleep. Associated symptoms include abdominal pain (generalized) and increased flatus. Pertinent negatives include no arthralgias, bloating, chills, coughing, fever, headaches, myalgias, sweats, URI, vomiting or weight loss. Nothing aggravates the symptoms. Risk factors include ill contacts (grandchildren with similar symptoms). The treatment provided significant relief. There is no history of bowel resection, inflammatory bowel disease, irritable bowel syndrome, malabsorption, a recent abdominal surgery or short gut syndrome.     Review of patient's allergies indicates:   Allergen Reactions    Augmentin [amoxicillin-pot clavulanate] Rash    Pcn [penicillins] Rash     Past Medical History:   Diagnosis Date    BPH (benign prostatic hyperplasia)     HTN (hypertension)      Past Surgical History:   Procedure Laterality Date    bilateral lap inguinal hernia repair      CHOLECYSTECTOMY  2016    CYSTOSCOPY WITH STENT PLACEMENT Right 12/12/2017    Performed by Bradly Green IV, MD at [facility] OR    EXTRACTION-STONE-URETEROSCOPY Right 12/12/2017    Performed by Bradly Green IV, MD at [facility] OR    HERNIA REPAIR      left ankle      REPAIR-HERNIA-INGUINAL RECURRENT Left 9/4/2015    Performed by Luciano Murphy MD at [facility] OR    TONSILLECTOMY, ADENOIDECTOMY       History reviewed. No pertinent family history.  Social History     Tobacco Use    Smoking status: Never Smoker    Smokeless tobacco: Never Used   Substance Use Topics    Alcohol  use: No     Alcohol/week: 0.0 oz    Drug use: Not on file     Review of Systems   Constitutional: Negative for chills, fever and weight loss.   HENT: Negative for sore throat.    Respiratory: Negative for cough and shortness of breath.    Cardiovascular: Negative for chest pain.   Gastrointestinal: Positive for abdominal pain (generalized), diarrhea and flatus. Negative for bloating, nausea and vomiting.   Genitourinary: Negative for dysuria.   Musculoskeletal: Negative for arthralgias, back pain and myalgias.   Skin: Negative for rash.   Neurological: Negative for weakness and headaches.   Hematological: Does not bruise/bleed easily.   All other systems reviewed and are negative.      Physical Exam     Initial Vitals   BP Pulse Resp Temp SpO2   01/25/19 0852 01/25/19 0852 01/25/19 0852 01/25/19 0900 01/25/19 0852   (!) 82/49 83 16 98.3 °F (36.8 °C) 97 %      MAP       --                Physical Exam    Nursing note and vitals reviewed.  Constitutional: He appears well-developed and well-nourished. He is not diaphoretic. He appears ill. No distress.   HENT:   Head: Normocephalic and atraumatic.   Right Ear: Hearing normal.   Left Ear: Hearing normal.   Nose: Nose normal.   Mouth/Throat: Uvula is midline. Mucous membranes are dry.   Eyes: EOM are normal. Pupils are equal, round, and reactive to light. No scleral icterus.   Neck: Normal range of motion. Neck supple. No thyromegaly present.   Cardiovascular: Normal rate, regular rhythm, normal heart sounds and intact distal pulses. Exam reveals no gallop and no friction rub.    No murmur heard.  Pulmonary/Chest: Breath sounds normal. No respiratory distress. He has no wheezes. He has no rhonchi. He exhibits no tenderness.   Abdominal: Soft. Bowel sounds are normal. He exhibits no distension. There is generalized tenderness. There is no rebound and no guarding. No hernia.   Musculoskeletal: Normal range of motion. He exhibits no edema or tenderness.   Lymphadenopathy:      He has no cervical adenopathy.   Neurological: He is alert and oriented to person, place, and time. He has normal strength. No cranial nerve deficit or sensory deficit.   Skin: Skin is warm and dry.   Psychiatric: He has a normal mood and affect. His behavior is normal. Judgment and thought content normal.         ED Course   Critical Care  Date/Time: 2/28/2019 7:40 AM  Performed by: Paco Sutton Jr., MD  Authorized by: Johnathan Gomes MD   Direct patient critical care time: 10 minutes  Additional history critical care time: 10 minutes  Ordering / reviewing critical care time: 10 minutes  Documentation critical care time: 10 minutes  Consulting other physicians critical care time: 10 minutes  Consult with family critical care time: 10 minutes  Other critical care time: 15 minutes  Total critical care time (exclusive of procedural time) : 75 minutes  Critical care time was exclusive of separately billable procedures and treating other patients and teaching time.  Critical care was necessary to treat or prevent imminent or life-threatening deterioration of the following conditions: renal failure.  Critical care was time spent personally by me on the following activities: development of treatment plan with patient or surrogate, blood draw for specimens, discussions with consultants, evaluation of patient's response to treatment, obtaining history from patient or surrogate, ordering and review of laboratory studies, pulse oximetry, review of old charts, re-evaluation of patient's condition, ordering and review of radiographic studies, ordering and performing treatments and interventions, examination of patient and interpretation of cardiac output measurements.        Labs Reviewed   CBC W/ AUTO DIFFERENTIAL - Abnormal; Notable for the following components:       Result Value    MCH 31.4 (*)     Mono # 1.6 (*)     Lymph% 16.6 (*)     All other components within normal limits   COMPREHENSIVE METABOLIC PANEL -  Abnormal; Notable for the following components:    Sodium 131 (*)     CO2 17 (*)     Glucose 172 (*)     BUN, Bld 85 (*)     Creatinine 8.4 (*)     Anion Gap 19 (*)     eGFR if  7.0 (*)     eGFR if non  6.0 (*)     All other components within normal limits   URINALYSIS, REFLEX TO URINE CULTURE - Abnormal; Notable for the following components:    Appearance, UA Cloudy (*)     Protein, UA Trace (*)     Occult Blood UA 1+ (*)     All other components within normal limits    Narrative:     Preferred Collection Type->Urine, Clean Catch   URINALYSIS MICROSCOPIC - Abnormal; Notable for the following components:    Bacteria, UA Few (*)     Hyaline Casts, UA 3 (*)     All other components within normal limits    Narrative:     Preferred Collection Type->Urine, Clean Catch   LIPASE   CK          Imaging Results          CT Abdomen Pelvis  Without Contrast (Final result)  Result time 01/25/19 12:32:20    Final result by Bradly Zuniga III, MD (01/25/19 12:32:20)                 Impression:      Dilated mid to distal small bowel with air-fluid levels possibly representing low-grade partial small bowel obstruction or enteritis.  No obstructing lesion, inflammatory changes, free air or suspicious fluid collections identified.  No other acute disease identified.  Chronic findings, as above.    All CT scans at this facility are performed  using dose modulation techniques as appropriate to performed exam including the following:  automated exposure control; adjustment of mA and/or kV according to the patients size (this includes techniques or standardized protocols for targeted exams where dose is matched to indication/reason for exam: i.e. extremities or head);  iterative reconstruction technique.      Electronically signed by: Bradly Zuniga MD  Date:    01/25/2019  Time:    12:32             Narrative:    EXAMINATION:  CT ABDOMEN PELVIS WITHOUT CONTRAST    CLINICAL HISTORY:  Abdominal pain,  gastroenteritis or colitis suspected;    TECHNIQUE:  Routine CT abdomen and pelvis performed without IV contrast.  Oral contrast was administered.  Coronal and sagittal reformatted images obtained.    COMPARISON:  Abdominal CT December 2, 2017    FINDINGS:  No acute disease identified in the lung bases.  Stable chronic bilateral L5 spondylolysis with grade 1 spondylolisthesis.  No acute osseous abnormality or suspicious bone lesion identified.    There is mild mid to distal small bowel dilation with air-fluid levels.  There may be a gradual transition point to partially collapsed small bowel in the right upper quadrant.  No obstructing lesion is identified.  No wall thickening or inflammatory changes identified.  There is oral contrast extending into the more distal small bowel and colon to the level of the sigmoid the without evidence of complete or high-grade obstruction.  There are scattered colonic diverticula without evidence of acute diverticulitis.  The remainder of the bowel is within normal limits.  The appendix is normal.  No free air, pneumatosis, free fluid or abscess identified.  There is identified complicated fat containing left inguinal hernia, unchanged.    There is a single punctate nonobstructing left renal calculus.  The kidneys and ureters are otherwise unremarkable without hydronephrosis.  There is persistent marked prostate enlargement with mass effect upon the adjacent bladder.  The bladder is incompletely distended with mild bladder wall thickening which appears similar to prior exam.    There is diffuse fatty infiltration of the liver.  The unenhanced liver is otherwise unremarkable.  Prior cholecystectomy is again noted.  The unenhanced pancreas, spleen and adrenals are unremarkable. The aorta is normal in caliber.  No pathologically enlarged lymph nodes identified.                               X-Ray Abdomen Flat And Erect (Final result)  Result time 01/25/19 09:55:29    Final result by  Bradly Zuniga III, MD (01/25/19 09:55:29)                 Impression:      Dilated small bowel suspicious for small bowel obstruction..      Electronically signed by: Bradly Zuniga MD  Date:    01/25/2019  Time:    09:55             Narrative:    EXAMINATION:  XR ABDOMEN FLAT AND ERECT    CLINICAL HISTORY:  Abdominal Pain;    COMPARISON:  07/30/2018    FINDINGS:  There are dilated loops of small bowel in the upper and mid abdomen measuring up to 4.4 cm in diameter with air-fluid levels suspicious for small bowel obstruction, new since prior exam.  The colon is nondistended.  No definite free air is seen. Cholecystectomy clips are noted.  Bilateral pelvic phleboliths are again noted..  No radiographic evidence of urolithiasis.                                       Vitals:    01/25/19 1132 01/25/19 1450 01/25/19 1612 01/25/19 1749   BP: (!) 102/54 (!) 102/59 (!) 100/52    Pulse: 63 68 68    Resp: 20 16 20    Temp:    98.7 °F (37.1 °C)   TempSrc:    Oral   SpO2: 98% 96% 95%    Weight:       Height:        01/25/19 1823 01/25/19 1929 01/25/19 2345 01/26/19 0009   BP: (!) 112/56 130/66  (!) 103/54   Pulse: 60 63 (!) 51 61   Resp: 20 18  18   Temp: 98.5 °F (36.9 °C) 97.6 °F (36.4 °C)  97.4 °F (36.3 °C)   TempSrc: Oral Oral  Oral   SpO2: 97% 96%  95%   Weight:       Height:        01/26/19 0142 01/26/19 0327 01/26/19 0620 01/26/19 0733   BP:  114/62  103/60   Pulse: (!) 56 (!) 54 (!) 52 (!) 53   Resp:  18  18   Temp:  97.6 °F (36.4 °C)  97.7 °F (36.5 °C)   TempSrc:  Oral  Oral   SpO2:  (!) 94%  95%   Weight:       Height:        01/26/19 0930 01/26/19 1108 01/26/19 1238   BP:   (!) 114/59   Pulse: (!) 59 (!) 55 (!) 52   Resp:   17   Temp:   97.9 °F (36.6 °C)   TempSrc:   Oral   SpO2:   95%   Weight:      Height:          Results for orders placed or performed during the hospital encounter of 01/25/19   CBC W/ AUTO DIFFERENTIAL   Result Value Ref Range    WBC 11.08 3.90 - 12.70 K/uL    RBC 5.22 4.60 - 6.20 M/uL     Hemoglobin 16.4 14.0 - 18.0 g/dL    Hematocrit 46.1 40.0 - 54.0 %    MCV 88 82 - 98 fL    MCH 31.4 (H) 27.0 - 31.0 pg    MCHC 35.6 32.0 - 36.0 g/dL    RDW 12.7 11.5 - 14.5 %    Platelets 230 150 - 350 K/uL    MPV 10.7 9.2 - 12.9 fL    Gran # (ANC) 7.5 1.8 - 7.7 K/uL    Lymph # 1.8 1.0 - 4.8 K/uL    Mono # 1.6 (H) 0.3 - 1.0 K/uL    Eos # 0.0 0.0 - 0.5 K/uL    Baso # 0.05 0.00 - 0.20 K/uL    Gran% 68.0 38.0 - 73.0 %    Lymph% 16.6 (L) 18.0 - 48.0 %    Mono% 14.8 4.0 - 15.0 %    Eosinophil% 0.1 0.0 - 8.0 %    Basophil% 0.5 0.0 - 1.9 %    Differential Method Automated    Comp. Metabolic Panel   Result Value Ref Range    Sodium 131 (L) 136 - 145 mmol/L    Potassium 3.6 3.5 - 5.1 mmol/L    Chloride 95 95 - 110 mmol/L    CO2 17 (L) 23 - 29 mmol/L    Glucose 172 (H) 70 - 110 mg/dL    BUN, Bld 85 (H) 8 - 23 mg/dL    Creatinine 8.4 (H) 0.5 - 1.4 mg/dL    Calcium 9.0 8.7 - 10.5 mg/dL    Total Protein 8.1 6.0 - 8.4 g/dL    Albumin 4.2 3.5 - 5.2 g/dL    Total Bilirubin 0.9 0.1 - 1.0 mg/dL    Alkaline Phosphatase 61 55 - 135 U/L    AST 25 10 - 40 U/L    ALT 40 10 - 44 U/L    Anion Gap 19 (H) 8 - 16 mmol/L    eGFR if African American 7.0 (A) >60 mL/min/1.73 m^2    eGFR if non African American 6.0 (A) >60 mL/min/1.73 m^2   Lipase   Result Value Ref Range    Lipase 60 4 - 60 U/L   Urinalysis, Reflex to Urine Culture Urine, Clean Catch   Result Value Ref Range    Specimen UA Urine, Clean Catch     Color, UA Yellow Yellow, Straw, Kyra    Appearance, UA Cloudy (A) Clear    pH, UA 5.0 5.0 - 8.0    Specific Gravity, UA 1.025 1.005 - 1.030    Protein, UA Trace (A) Negative    Glucose, UA Negative Negative    Ketones, UA Negative Negative    Bilirubin (UA) Negative Negative    Occult Blood UA 1+ (A) Negative    Nitrite, UA Negative Negative    Urobilinogen, UA Negative <2.0 EU/dL    Leukocytes, UA Negative Negative   CK   Result Value Ref Range     20 - 200 U/L   Urinalysis Microscopic   Result Value Ref Range    RBC, UA 2 0 - 4  /hpf    WBC, UA 3 0 - 5 /hpf    Bacteria, UA Few (A) None-Occ /hpf    Hyaline Casts, UA 3 (A) 0-1/lpf /lpf    Microscopic Comment SEE COMMENT    Basic metabolic panel   Result Value Ref Range    Sodium 136 136 - 145 mmol/L    Potassium 3.6 3.5 - 5.1 mmol/L    Chloride 105 95 - 110 mmol/L    CO2 21 (L) 23 - 29 mmol/L    Glucose 122 (H) 70 - 110 mg/dL    BUN, Bld 71 (H) 8 - 23 mg/dL    Creatinine 3.0 (H) 0.5 - 1.4 mg/dL    Calcium 8.5 (L) 8.7 - 10.5 mg/dL    Anion Gap 10 8 - 16 mmol/L    eGFR if African American 24 (A) >60 mL/min/1.73 m^2    eGFR if non African American 21 (A) >60 mL/min/1.73 m^2   Magnesium   Result Value Ref Range    Magnesium 2.1 1.6 - 2.6 mg/dL   Phosphorus   Result Value Ref Range    Phosphorus 3.5 2.7 - 4.5 mg/dL   Basic metabolic panel   Result Value Ref Range    Sodium 137 136 - 145 mmol/L    Potassium 4.2 3.5 - 5.1 mmol/L    Chloride 107 95 - 110 mmol/L    CO2 20 (L) 23 - 29 mmol/L    Glucose 115 (H) 70 - 110 mg/dL    BUN, Bld 70 (H) 8 - 23 mg/dL    Creatinine 2.4 (H) 0.5 - 1.4 mg/dL    Calcium 8.6 (L) 8.7 - 10.5 mg/dL    Anion Gap 10 8 - 16 mmol/L    eGFR if African American 32 (A) >60 mL/min/1.73 m^2    eGFR if non African American 27 (A) >60 mL/min/1.73 m^2   CBC auto differential   Result Value Ref Range    WBC 6.62 3.90 - 12.70 K/uL    RBC 4.60 4.60 - 6.20 M/uL    Hemoglobin 14.6 14.0 - 18.0 g/dL    Hematocrit 41.2 40.0 - 54.0 %    MCV 90 82 - 98 fL    MCH 31.7 (H) 27.0 - 31.0 pg    MCHC 35.4 32.0 - 36.0 g/dL    RDW 12.9 11.5 - 14.5 %    Platelets 181 150 - 350 K/uL    MPV 10.5 9.2 - 12.9 fL    Gran # (ANC) 3.2 1.8 - 7.7 K/uL    Lymph # 2.2 1.0 - 4.8 K/uL    Mono # 1.2 (H) 0.3 - 1.0 K/uL    Eos # 0.1 0.0 - 0.5 K/uL    Baso # 0.04 0.00 - 0.20 K/uL    Gran% 48.3 38.0 - 73.0 %    Lymph% 32.5 18.0 - 48.0 %    Mono% 17.8 (H) 4.0 - 15.0 %    Eosinophil% 1.1 0.0 - 8.0 %    Basophil% 0.6 0.0 - 1.9 %    Differential Method Automated    Magnesium   Result Value Ref Range    Magnesium 2.2 1.6 - 2.6  mg/dL   Phosphorus   Result Value Ref Range    Phosphorus 3.1 2.7 - 4.5 mg/dL         Imaging Results          CT Abdomen Pelvis  Without Contrast (Final result)  Result time 01/25/19 12:32:20    Final result by Bradly Zuniga III, MD (01/25/19 12:32:20)                 Impression:      Dilated mid to distal small bowel with air-fluid levels possibly representing low-grade partial small bowel obstruction or enteritis.  No obstructing lesion, inflammatory changes, free air or suspicious fluid collections identified.  No other acute disease identified.  Chronic findings, as above.    All CT scans at this facility are performed  using dose modulation techniques as appropriate to performed exam including the following:  automated exposure control; adjustment of mA and/or kV according to the patients size (this includes techniques or standardized protocols for targeted exams where dose is matched to indication/reason for exam: i.e. extremities or head);  iterative reconstruction technique.      Electronically signed by: Bradly Zuniga MD  Date:    01/25/2019  Time:    12:32             Narrative:    EXAMINATION:  CT ABDOMEN PELVIS WITHOUT CONTRAST    CLINICAL HISTORY:  Abdominal pain, gastroenteritis or colitis suspected;    TECHNIQUE:  Routine CT abdomen and pelvis performed without IV contrast.  Oral contrast was administered.  Coronal and sagittal reformatted images obtained.    COMPARISON:  Abdominal CT December 2, 2017    FINDINGS:  No acute disease identified in the lung bases.  Stable chronic bilateral L5 spondylolysis with grade 1 spondylolisthesis.  No acute osseous abnormality or suspicious bone lesion identified.    There is mild mid to distal small bowel dilation with air-fluid levels.  There may be a gradual transition point to partially collapsed small bowel in the right upper quadrant.  No obstructing lesion is identified.  No wall thickening or inflammatory changes identified.  There is oral contrast  extending into the more distal small bowel and colon to the level of the sigmoid the without evidence of complete or high-grade obstruction.  There are scattered colonic diverticula without evidence of acute diverticulitis.  The remainder of the bowel is within normal limits.  The appendix is normal.  No free air, pneumatosis, free fluid or abscess identified.  There is identified complicated fat containing left inguinal hernia, unchanged.    There is a single punctate nonobstructing left renal calculus.  The kidneys and ureters are otherwise unremarkable without hydronephrosis.  There is persistent marked prostate enlargement with mass effect upon the adjacent bladder.  The bladder is incompletely distended with mild bladder wall thickening which appears similar to prior exam.    There is diffuse fatty infiltration of the liver.  The unenhanced liver is otherwise unremarkable.  Prior cholecystectomy is again noted.  The unenhanced pancreas, spleen and adrenals are unremarkable. The aorta is normal in caliber.  No pathologically enlarged lymph nodes identified.                               X-Ray Abdomen Flat And Erect (Final result)  Result time 01/25/19 09:55:29    Final result by Bradly Zuniga III, MD (01/25/19 09:55:29)                 Impression:      Dilated small bowel suspicious for small bowel obstruction..      Electronically signed by: Bradly Zuniga MD  Date:    01/25/2019  Time:    09:55             Narrative:    EXAMINATION:  XR ABDOMEN FLAT AND ERECT    CLINICAL HISTORY:  Abdominal Pain;    COMPARISON:  07/30/2018    FINDINGS:  There are dilated loops of small bowel in the upper and mid abdomen measuring up to 4.4 cm in diameter with air-fluid levels suspicious for small bowel obstruction, new since prior exam.  The colon is nondistended.  No definite free air is seen. Cholecystectomy clips are noted.  Bilateral pelvic phleboliths are again noted..  No radiographic evidence of urolithiasis.                                 Medications   sodium chloride 0.9% flush 5 mL (not administered)   0.9%  NaCl infusion ( Intravenous New Bag 1/26/19 1105)   ondansetron injection 4 mg (not administered)   promethazine (PHENERGAN) 6.25 mg in dextrose 5 % 50 mL IVPB (not administered)   heparin (porcine) injection 5,000 Units (5,000 Units Subcutaneous Given 1/26/19 1314)   tamsulosin 24 hr capsule 0.4 mg (0.4 mg Oral Given 1/26/19 0930)   pantoprazole EC tablet 40 mg (40 mg Oral Given 1/26/19 0930)   sodium chloride 0.9% bolus 1,000 mL (0 mLs Intravenous Stopped 1/25/19 1006)   sodium chloride 0.9% bolus 1,000 mL (0 mLs Intravenous Stopped 1/25/19 1120)   omnipaque 350 iohexol 30 mL (30 mLs Oral Given 1/25/19 1203)         10:00 AM  - Re-evaluation:  The patient is resting comfortably and is in no acute distress. Discussed test results and notified of pending labs. Answered questions at this time.     10:35 AM - CONSULT: Dr. Sutton discussed the case with . Agrees with current management. Recommends admit, ct abd and will see pt in hospital room.  Admitting Service: hosp [facility]   Admitting Physician: Dr. Galindo   Admit to [facility]    10:35 AM - Re-evaluation:  Discussed test results, shared treatment plan, and the need for admission with patient and family. They understand and agree to the plan as discussed. Answered questions at this time.     All historical, clinical, radiographic, and laboratory findings were reviewed with the patient/family in detail along with the indications for transport to the facility in [place] in order to receive further evaluation and treatment.  All remaining questions and concerns were addressed at this time and the patient/family communicates understanding and agrees to proceed accordingly.  Similarly, all pertinent details of the encounter were discussed with Dr. Galindo who agrees to receive the patient at [facility] for further care as outlined above.  The patient will be  transferred by [facility] ambulance services secondary to a need for ongoing ivf and cardiac monitoring en route.  Paco Sutton MD  10:35 AM    Pre-hypertension/Hypertension: The pt has been informed that they may have pre-hypertension or hypertension based on a blood pressure reading in the ED. I recommend that the pt call the PCP listed on their discharge instructions or a physician of their choice this week to arrange f/u for further evaluation of possible pre-hypertension or hypertension.     Delfino Monk was given a handout which discussed their disease process, precautions, and instructions for follow-up and therapy.           Medication List      ASK your doctor about these medications    aspirin 81 MG EC tablet  Commonly known as:  ECOTRIN     BENICAR 20 MG tablet  Generic drug:  olmesartan     finasteride 5 mg tablet  Commonly known as:  PROSCAR  Take 1 tablet (5 mg total) by mouth once daily.     tamsulosin 0.4 mg Cap  Commonly known as:  FLOMAX  Take 1 capsule (0.4 mg total) by mouth once daily.           Current Discharge Medication List            ED Diagnosis  1. BERTIN (acute kidney injury)    2. Dehydration    3. Partial intestinal obstruction, unspecified cause    4. Enteritis    5. Diarrhea of presumed infectious origin    6. Nephrolithiasis    7. Acute gastroenteritis    8. Viral gastroenteritis                          Clinical Impression:   The primary encounter diagnosis was BERTIN (acute kidney injury). Diagnoses of Dehydration, Partial intestinal obstruction, unspecified cause, Enteritis, Diarrhea of presumed infectious origin, Nephrolithiasis, Acute gastroenteritis, and Viral gastroenteritis were also pertinent to this visit.      Disposition:   Disposition: Admitted  Condition: Stable                        Paco Sutton Jr., MD  01/26/19 1354       Paco Sutton Jr., MD  02/28/19 0741

## 2019-01-25 NOTE — ED NOTES
Spoke with MICHELLE Tong on Med-Surg. Bed is ready and the nurse is ready to accept report. Will notifiy CELESTINO Robert.

## 2019-01-25 NOTE — ED NOTES
MD at bedside updating pt and spouse on plan of care at this time.     Pt sitting up in stretcher, NAD. Resp e/u. NSR on monitor. Aware of plan of care. Pt reports feeling better since getting fluids administered, but still feels weak. Spouse at bedside. Bed locked in lowest position. Side rails up x 2. No complaints or further questions at this time.

## 2019-01-25 NOTE — ED NOTES
Pt ambulatory to and from restroom without difficulty. Denies dizziness, SOB, CP, nausea or abdominal pain. Reports having small amount of liquids stool. Attached back to monitor. Will continue to monitor.

## 2019-01-26 PROBLEM — N40.0 ENLARGED PROSTATE: Status: ACTIVE | Noted: 2017-07-06

## 2019-01-26 PROBLEM — I10 ESSENTIAL HYPERTENSION: Status: ACTIVE | Noted: 2019-01-26

## 2019-01-26 PROBLEM — K43.9 VENTRAL HERNIA WITHOUT OBSTRUCTION OR GANGRENE: Status: ACTIVE | Noted: 2017-07-06

## 2019-01-26 PROBLEM — M62.08 DIASTASIS RECTI: Status: ACTIVE | Noted: 2019-01-26

## 2019-01-26 PROBLEM — I10 ESSENTIAL HYPERTENSION: Chronic | Status: ACTIVE | Noted: 2019-01-26

## 2019-01-26 LAB
ANION GAP SERPL CALC-SCNC: 10 MMOL/L
ANION GAP SERPL CALC-SCNC: 10 MMOL/L
BASOPHILS # BLD AUTO: 0.04 K/UL
BASOPHILS NFR BLD: 0.6 %
BUN SERPL-MCNC: 70 MG/DL
BUN SERPL-MCNC: 71 MG/DL
CALCIUM SERPL-MCNC: 8.5 MG/DL
CALCIUM SERPL-MCNC: 8.6 MG/DL
CHLORIDE SERPL-SCNC: 105 MMOL/L
CHLORIDE SERPL-SCNC: 107 MMOL/L
CO2 SERPL-SCNC: 20 MMOL/L
CO2 SERPL-SCNC: 21 MMOL/L
CREAT SERPL-MCNC: 2.4 MG/DL
CREAT SERPL-MCNC: 3 MG/DL
DIFFERENTIAL METHOD: ABNORMAL
EOSINOPHIL # BLD AUTO: 0.1 K/UL
EOSINOPHIL NFR BLD: 1.1 %
ERYTHROCYTE [DISTWIDTH] IN BLOOD BY AUTOMATED COUNT: 12.9 %
EST. GFR  (AFRICAN AMERICAN): 24 ML/MIN/1.73 M^2
EST. GFR  (AFRICAN AMERICAN): 32 ML/MIN/1.73 M^2
EST. GFR  (NON AFRICAN AMERICAN): 21 ML/MIN/1.73 M^2
EST. GFR  (NON AFRICAN AMERICAN): 27 ML/MIN/1.73 M^2
GLUCOSE SERPL-MCNC: 115 MG/DL
GLUCOSE SERPL-MCNC: 122 MG/DL
HCT VFR BLD AUTO: 41.2 %
HGB BLD-MCNC: 14.6 G/DL
LYMPHOCYTES # BLD AUTO: 2.2 K/UL
LYMPHOCYTES NFR BLD: 32.5 %
MAGNESIUM SERPL-MCNC: 2.1 MG/DL
MAGNESIUM SERPL-MCNC: 2.2 MG/DL
MCH RBC QN AUTO: 31.7 PG
MCHC RBC AUTO-ENTMCNC: 35.4 G/DL
MCV RBC AUTO: 90 FL
MONOCYTES # BLD AUTO: 1.2 K/UL
MONOCYTES NFR BLD: 17.8 %
NEUTROPHILS # BLD AUTO: 3.2 K/UL
NEUTROPHILS NFR BLD: 48.3 %
PHOSPHATE SERPL-MCNC: 3.1 MG/DL
PHOSPHATE SERPL-MCNC: 3.5 MG/DL
PLATELET # BLD AUTO: 181 K/UL
PMV BLD AUTO: 10.5 FL
POTASSIUM SERPL-SCNC: 3.6 MMOL/L
POTASSIUM SERPL-SCNC: 4.2 MMOL/L
RBC # BLD AUTO: 4.6 M/UL
SODIUM SERPL-SCNC: 136 MMOL/L
SODIUM SERPL-SCNC: 137 MMOL/L
WBC # BLD AUTO: 6.62 K/UL

## 2019-01-26 PROCEDURE — 36415 COLL VENOUS BLD VENIPUNCTURE: CPT

## 2019-01-26 PROCEDURE — 84100 ASSAY OF PHOSPHORUS: CPT | Mod: 91

## 2019-01-26 PROCEDURE — 21400001 HC TELEMETRY ROOM

## 2019-01-26 PROCEDURE — 99233 PR SUBSEQUENT HOSPITAL CARE,LEVL III: ICD-10-PCS | Mod: ,,, | Performed by: INTERNAL MEDICINE

## 2019-01-26 PROCEDURE — 83735 ASSAY OF MAGNESIUM: CPT

## 2019-01-26 PROCEDURE — 80048 BASIC METABOLIC PNL TOTAL CA: CPT | Mod: 91

## 2019-01-26 PROCEDURE — 11000001 HC ACUTE MED/SURG PRIVATE ROOM

## 2019-01-26 PROCEDURE — 85025 COMPLETE CBC W/AUTO DIFF WBC: CPT

## 2019-01-26 PROCEDURE — 63600175 PHARM REV CODE 636 W HCPCS: Performed by: NURSE PRACTITIONER

## 2019-01-26 PROCEDURE — 99233 SBSQ HOSP IP/OBS HIGH 50: CPT | Mod: ,,, | Performed by: INTERNAL MEDICINE

## 2019-01-26 PROCEDURE — 25000003 PHARM REV CODE 250: Performed by: NURSE PRACTITIONER

## 2019-01-26 RX ORDER — ONDANSETRON 2 MG/ML
4 INJECTION INTRAMUSCULAR; INTRAVENOUS EVERY 6 HOURS PRN
Status: DISCONTINUED | OUTPATIENT
Start: 2019-01-26 | End: 2019-01-27 | Stop reason: HOSPADM

## 2019-01-26 RX ORDER — HEPARIN SODIUM 5000 [USP'U]/ML
5000 INJECTION, SOLUTION INTRAVENOUS; SUBCUTANEOUS EVERY 8 HOURS
Status: DISCONTINUED | OUTPATIENT
Start: 2019-01-26 | End: 2019-01-27 | Stop reason: HOSPADM

## 2019-01-26 RX ORDER — PANTOPRAZOLE SODIUM 40 MG/1
40 TABLET, DELAYED RELEASE ORAL DAILY
Status: DISCONTINUED | OUTPATIENT
Start: 2019-01-26 | End: 2019-01-27 | Stop reason: HOSPADM

## 2019-01-26 RX ORDER — TAMSULOSIN HYDROCHLORIDE 0.4 MG/1
0.4 CAPSULE ORAL DAILY
Status: DISCONTINUED | OUTPATIENT
Start: 2019-01-26 | End: 2019-01-27 | Stop reason: HOSPADM

## 2019-01-26 RX ADMIN — SODIUM CHLORIDE: 0.9 INJECTION, SOLUTION INTRAVENOUS at 09:01

## 2019-01-26 RX ADMIN — TAMSULOSIN HYDROCHLORIDE 0.4 MG: 0.4 CAPSULE ORAL at 09:01

## 2019-01-26 RX ADMIN — PANTOPRAZOLE SODIUM 40 MG: 40 TABLET, DELAYED RELEASE ORAL at 09:01

## 2019-01-26 RX ADMIN — SODIUM CHLORIDE: 0.9 INJECTION, SOLUTION INTRAVENOUS at 11:01

## 2019-01-26 RX ADMIN — HEPARIN SODIUM 5000 UNITS: 5000 INJECTION, SOLUTION INTRAVENOUS; SUBCUTANEOUS at 09:01

## 2019-01-26 RX ADMIN — HEPARIN SODIUM 5000 UNITS: 5000 INJECTION, SOLUTION INTRAVENOUS; SUBCUTANEOUS at 01:01

## 2019-01-26 RX ADMIN — HEPARIN SODIUM 5000 UNITS: 5000 INJECTION, SOLUTION INTRAVENOUS; SUBCUTANEOUS at 06:01

## 2019-01-26 NOTE — ASSESSMENT & PLAN NOTE
BERTIN likely due to diarrhea causing decreased renal perfusion  Expect will recover uneventfully, pt was reassured  Baseline s Cr is normal  K normal  Acid base  Hypotension as expected with fluid losses, intravascular fluid contraction  On NS crystalloid IVF's,   Agree with current mgmt  Replace any K losses  Hold benicar

## 2019-01-26 NOTE — ASSESSMENT & PLAN NOTE
62 y/o male with BERTIN and diarrhea:         BERTIN (acute kidney injury)     BERTIN likely due to diarrhea causing decreased renal perfusion  Expect will recover uneventfully, pt was reassured  Baseline s Cr is normal  K normal  Acid base  Hypotension as expected with fluid losses, intravascular fluid contraction  On NS crystalloid IVF's,   Agree with current mgmt  Replace any K losses  Hold benicar     Diarrhea: acute  Afebrile   WBC not high  Appears to have already resolved  Acute gastroenteritis  Likely viral  Sick contact likely was pt's 2 year old grandson      Nephrolithiasis     Had a large kidney stone 1.2 cm in Dec 2017  Pt's father had also a kidney stone 2 weeks later, unusual!  No h/o of cystine stone (hereditary)  No family food fetishes identified that would predispose 2 family members to kidney stones  Kidney stones in general discussed with pt  The stone had been sent for analysis, could not find the results in epic.         Plans and recommendations:  As discussed above  Total time spent 70 minutes including time needed to review the records, the   patient evaluation, documentation, face-to-face discussion with the patient,   more than 50% of the time was spent on coordination of care and counseling.    Level V visit.

## 2019-01-26 NOTE — ASSESSMENT & PLAN NOTE
Had a large kidney stone 1.2 cm in Dec 2017  Pt's father had also a kidney stone 2 weeks later, unusual!  No h/o of cystine stone (hereditary)  No family food fetishes identified that would predispose 2 family members to kidney stones  Kidney stones in general discussed with pt  The stone had been sent for analysis, could not find the results in epic.

## 2019-01-26 NOTE — SUBJECTIVE & OBJECTIVE
Past Medical History:   Diagnosis Date    BPH (benign prostatic hyperplasia)     HTN (hypertension)        Past Surgical History:   Procedure Laterality Date    bilateral lap inguinal hernia repair      CHOLECYSTECTOMY  2016    CYSTOSCOPY WITH STENT PLACEMENT Right 12/12/2017    Per[facility]    EXTRACTION-STONE-URETEROSCOPY Right 12/12/2017    [facility] at [facility] OR    HERNIA REPAIR      left ankle      REPAIR-HERNIA-INGUINAL RECURRENT Left 9/4/2015    Performed by Luciano Murphy MD at [facility] OR    TONSILLECTOMY, ADENOIDECTOMY         Review of patient's allergies indicates:   Allergen Reactions    Augmentin [amoxicillin-pot clavulanate] Rash    Pcn [penicillins] Rash     Current Facility-Administered Medications   Medication Frequency    0.9%  NaCl infusion Continuous     Family History     None        Tobacco Use    Smoking status: Never Smoker    Smokeless tobacco: Never Used   Substance and Sexual Activity    Alcohol use: No     Alcohol/week: 0.0 oz    Drug use: Not on file    Sexual activity: Not on file     Review of Systems   Constitutional: Negative.    HENT: Negative.    Cardiovascular: Negative.    Gastrointestinal: Positive for diarrhea.   Genitourinary: Negative.    Musculoskeletal: Negative.    Neurological: Negative.    Psychiatric/Behavioral: Negative.      Objective:     Vital Signs (Most Recent):  Temp: 97.6 °F (36.4 °C) (01/25/19 1929)  Pulse: 63 (01/25/19 1929)  Resp: 18 (01/25/19 1929)  BP: 130/66 (01/25/19 1929)  SpO2: 96 % (01/25/19 1929)  O2 Device (Oxygen Therapy): room air (01/25/19 1929) Vital Signs (24h Range):  Temp:  [97.6 °F (36.4 °C)-98.7 °F (37.1 °C)] 97.6 °F (36.4 °C)  Pulse:  [60-83] 63  Resp:  [16-20] 18  SpO2:  [95 %-98 %] 96 %  BP: ()/(44-66) 130/66     Weight: 89.6 kg (197 lb 8.5 oz) (01/25/19 0852)  Body mass index is 26.79 kg/m².  Body surface area is 2.13 meters squared.    I/O last 3  completed shifts:  In: 2000 [IV Piggyback:2000]  Out: 660 [Urine:660]    Physical Exam   Constitutional: He is oriented to person, place, and time. He appears well-developed and well-nourished. No distress.   HENT:   Head: Normocephalic.   Cardiovascular: Normal rate, regular rhythm and normal heart sounds.   Pulmonary/Chest: Effort normal and breath sounds normal. No respiratory distress. He has no rales.   Abdominal: Soft. He exhibits no distension. There is no tenderness. There is no guarding.   Musculoskeletal: He exhibits no edema.   Neurological: He is alert and oriented to person, place, and time.   Skin: Skin is warm and dry.   Psychiatric: He has a normal mood and affect. His behavior is normal. Thought content normal.   Nursing note and vitals reviewed.      Significant Labs: reviewed  BMP  Lab Results   Component Value Date     (L) 01/25/2019    K 3.6 01/25/2019    CL 95 01/25/2019    CO2 17 (L) 01/25/2019    BUN 85 (H) 01/25/2019    CREATININE 8.4 (H) 01/25/2019    CALCIUM 9.0 01/25/2019    ANIONGAP 19 (H) 01/25/2019    ESTGFRAFRICA 7.0 (A) 01/25/2019    EGFRNONAA 6.0 (A) 01/25/2019     Lab Results   Component Value Date    WBC 11.08 01/25/2019    HGB 16.4 01/25/2019    HCT 46.1 01/25/2019    MCV 88 01/25/2019     01/25/2019         Significant Imaging: reviewed

## 2019-01-26 NOTE — PROGRESS NOTES
"[facility] Hospital Medicine  Progress Note    Patient Name: Delfino Monk  MRN: [id number]  Patient Class: IP- Inpatient   Admission Date: 1/25/2019  Length of Stay: 1 days  Attending Physician: Johnathan Gomes, *  Primary Care Provider: Edgard Mendenhall MD        Subjective:     Principal Problem:BERTIN (acute kidney injury)    HPI:  Mr. Monk is a 62 yo male with a PMHx of HTN and BPH, who presented to the ED with c/o diarrhea x 5 days with ~5-10 watery episodes per day.  Associated generalized ABD pain, decreased UOP, and flatus.  Patient reports no diarrhea today, and has not voided in the past 2 days.  No aggravating or alleviating factors.  Denies any ABD distention, decreased appetite, N/V, constipation, melena, hematochezia, hematemesis, dysuria, hematuria, pelvic pain, flank pain, back pain, CP, SOB, lightheadedness, dizziness, syncope, AMS, fever, or chills.  No recent travel.  Patient's grandchildren recently had "stomach virus" with similar symptoms.  Upon arrival to ED, patient was notably hypotensive with BP 72/44.  He received IV fluid resuscitation with good BP response.  Initial work-up in ED resulted cr. 8.4, BUN 85, Na 131, K 3.6, WBC 11, TBili 0.9, normal lipase and LFTs.  CT of ABD/Pelvis showed dilated mid to distal small bowel with air-fluid levels possibly representing low-grade partial small bowel obstruction or enteritis.  Hospital Medicine was called for admission.  Case discussed with Nephrology who agree with current treatment.  Currently, patient appears comfortable in NAD.  Denies any diarrhea since arrival to ED.  BP remains stable.       Hospital Course:  62 y/o wm admitted with a dx of  BERTIN ,  Nausea and diarrhea . He was hypotensive which resolve with appropriate IVF.Nephrology was consulted . The CT abdomen show Dilated mid to distal small bowel with air-fluid levels possibly representing low-grade partial small bowel obstruction or enteritis. The " kidney function improved with previous tx . He still complaining of watery diarrhea which has  Improved since admission . He is tolerating liquid diet     Interval History:     Review of Systems   Constitutional: Negative for appetite change, chills, diaphoresis, fatigue, fever and unexpected weight change.   HENT: Negative for congestion, postnasal drip, rhinorrhea, sinus pressure and sore throat.    Respiratory: Negative for cough, chest tightness, shortness of breath and wheezing.    Cardiovascular: Negative for chest pain, palpitations and leg swelling.   Gastrointestinal: Positive for diarrhea. Negative for abdominal distention, abdominal pain, anal bleeding, blood in stool, constipation, nausea and vomiting.        + Flatus.   Genitourinary: Negative for decreased urine volume, dysuria, flank pain, frequency, hematuria and urgency.   Musculoskeletal: Negative for arthralgias, back pain and myalgias.   Skin: Negative for pallor, rash and wound.   Neurological: Negative for dizziness, tremors, syncope, weakness, light-headedness, numbness and headaches.   Psychiatric/Behavioral: Negative for confusion. The patient is not nervous/anxious.    All other systems reviewed and are negative.    Objective:     Vital Signs (Most Recent):  Temp: 97.9 °F (36.6 °C) (01/26/19 1238)  Pulse: (!) 52 (01/26/19 1238)  Resp: 17 (01/26/19 1238)  BP: (!) 114/59 (01/26/19 1238)  SpO2: 95 % (01/26/19 1238) Vital Signs (24h Range):  Temp:  [97.4 °F (36.3 °C)-98.7 °F (37.1 °C)] 97.9 °F (36.6 °C)  Pulse:  [51-68] 52  Resp:  [16-20] 17  SpO2:  [94 %-97 %] 95 %  BP: (100-130)/(52-66) 114/59     Weight: 89.6 kg (197 lb 8.5 oz)  Body mass index is 26.79 kg/m².    Intake/Output Summary (Last 24 hours) at 1/26/2019 1406  Last data filed at 1/26/2019 0040  Gross per 24 hour   Intake --   Output 551 ml   Net -551 ml      Physical Exam   Constitutional: He is oriented to person, place, and time. He appears well-developed and well-nourished. No  distress.   HENT:   Head: Normocephalic.   Cardiovascular: Normal rate, regular rhythm and normal heart sounds.   Pulmonary/Chest: Effort normal and breath sounds normal. No respiratory distress. He has no rales.   Abdominal: Soft. He exhibits no distension. There is no tenderness. There is no guarding.   Musculoskeletal: He exhibits no edema.   Neurological: He is alert and oriented to person, place, and time.   Skin: Skin is warm and dry.   Psychiatric: He has a normal mood and affect. His behavior is normal. Thought content normal.   Nursing note and vitals reviewed.      Significant Labs: All pertinent labs within the past 24 hours have been reviewed.    Significant Imaging: I have reviewed all pertinent imaging results/findings within the past 24 hours.    Assessment/Plan:      * BERTIN secondary to intravascular volume depletion    -Improving   - Avoid nephrotoxic agents.  Hold ARB.  - Strict I&O's.  - Follow labs.  - Nephrology consult.     Essential hypertension    - Hypotensive in ED.  BP responded well to IVF's and remains stable.  - Hold all antihypertensives, monitor BP trends and resume as needed.     Acute gastroenteritis    - Likely viral.  - Continue supportive care.       VTE Risk Mitigation (From admission, onward)        Ordered     heparin (porcine) injection 5,000 Units  Every 8 hours      01/26/19 0003     Place sequential compression device  Until discontinued      01/26/19 0003     IP VTE LOW RISK PATIENT  Once      01/25/19 5451              Johnathan Gomes MD  Department of Hospital Medicine   [facility]

## 2019-01-26 NOTE — HOSPITAL COURSE
62 y/o wm admitted with a dx of  BERTIN ,  Nausea and diarrhea . He was hypotensive which resolve with appropriate IVF.Nephrology was consulted . The CT abdomen show Dilated mid to distal small bowel with air-fluid levels possibly representing low-grade partial small bowel obstruction or enteritis. The kidney function improved with previous tx . He still complaining of watery diarrhea which has  Improved since admission . He is tolerating liquid diet   1/27/19 Pt was seen and examined at bedside . He was determined to be suitable for d/c . The kidney function went back to baseline . He diarrhea improved . Pt  Tolerate full diet w/o any problem .

## 2019-01-26 NOTE — ASSESSMENT & PLAN NOTE
- Initial cr. 8.4, BUN 85, potassium stable.  - IV fluid resuscitation given in ED.  Continuous IVF's.  - Avoid nephrotoxic agents.  Hold ARB.  - Strict I&O's.  - Follow labs.  - Nephrology consult.

## 2019-01-26 NOTE — SUBJECTIVE & OBJECTIVE
Past Medical History:   Diagnosis Date    BPH (benign prostatic hyperplasia)     HTN (hypertension)        Past Surgical History:   Procedure Laterality Date    bilateral lap inguinal hernia repair      CHOLECYSTECTOMY  2016    CYSTOSCOPY WITH STENT PLACEMENT Right 12/12/2017    Per[facility] by [facility]ly [facility]    EXTRACTION-STONE-URETEROSCOPY Right 12/12/2017    Performed by Bradly Green IV, MD at [facility] OR    HERNIA REPAIR      left ankle      REPAIR-HERNIA-INGUINAL RECURRENT Left 9/4/2015    Performed by Luciano Murphy MD at [facility] OR    TONSILLECTOMY, ADENOIDECTOMY         Review of patient's allergies indicates:   Allergen Reactions    Augmentin [amoxicillin-pot clavulanate] Rash    Pcn [penicillins] Rash       No current facility-administered medications on file prior to encounter.      Current Outpatient Medications on File Prior to Encounter   Medication Sig    aspirin (ECOTRIN) 81 MG EC tablet Take 81 mg by mouth once daily.    finasteride (PROSCAR) 5 mg tablet Take 1 tablet (5 mg total) by mouth once daily.    olmesartan (BENICAR) 20 MG tablet Take 20 mg by mouth every evening.     tamsulosin (FLOMAX) 0.4 mg Cap Take 1 capsule (0.4 mg total) by mouth once daily.     Family History     Reviewed and not pertinent.         Tobacco Use    Smoking status: Never Smoker    Smokeless tobacco: Never Used   Substance and Sexual Activity    Alcohol use: No     Alcohol/week: 0.0 oz    Drug use: No    Sexual activity: Not on file     Review of Systems   Constitutional: Negative for appetite change, chills, diaphoresis, fatigue, fever and unexpected weight change.   HENT: Negative for congestion, postnasal drip, rhinorrhea, sinus pressure and sore throat.    Respiratory: Negative for cough, chest tightness, shortness of breath and wheezing.    Cardiovascular: Negative for chest pain, palpitations and leg swelling.   Gastrointestinal: Positive for abdominal pain (generalized) and  diarrhea. Negative for abdominal distention, anal bleeding, blood in stool, constipation, nausea and vomiting.        + Flatus.   Genitourinary: Positive for decreased urine volume. Negative for dysuria, flank pain, frequency, hematuria and urgency.   Musculoskeletal: Negative for arthralgias, back pain and myalgias.   Skin: Negative for pallor, rash and wound.   Neurological: Negative for dizziness, tremors, syncope, weakness, light-headedness, numbness and headaches.   Psychiatric/Behavioral: Negative for confusion. The patient is not nervous/anxious.    All other systems reviewed and are negative.    Objective:     Vital Signs (Most Recent):  Temp: 97.6 °F (36.4 °C) (01/25/19 1929)  Pulse: 63 (01/25/19 1929)  Resp: 18 (01/25/19 1929)  BP: 130/66 (01/25/19 1929)  SpO2: 96 % (01/25/19 1929) Vital Signs (24h Range):  Temp:  [97.6 °F (36.4 °C)-98.7 °F (37.1 °C)] 97.6 °F (36.4 °C)  Pulse:  [60-83] 63  Resp:  [16-20] 18  SpO2:  [95 %-98 %] 96 %  BP: ()/(44-66) 130/66     Weight: 89.6 kg (197 lb 8.5 oz)  Body mass index is 26.79 kg/m².    Physical Exam   Constitutional: He is oriented to person, place, and time. He appears well-developed and well-nourished. He appears ill. No distress.   HENT:   Head: Normocephalic and atraumatic.   Mouth/Throat: Mucous membranes are dry.   Eyes: Conjunctivae are normal.   PERRL; EOM intact.   Neck: Normal range of motion. Neck supple.   Cardiovascular: Normal rate, regular rhythm, S1 normal, S2 normal and intact distal pulses.  No extrasystoles are present. Exam reveals no gallop.   No murmur heard.  Pulses:       Radial pulses are 2+ on the right side, and 2+ on the left side.        Dorsalis pedis pulses are 2+ on the right side, and 2+ on the left side.        Posterior tibial pulses are 2+ on the right side, and 2+ on the left side.   Pulmonary/Chest: Effort normal and breath sounds normal. No accessory muscle usage. No tachypnea. No respiratory distress. He has no wheezes.  He has no rhonchi. He has no rales.   Abdominal: Soft. Bowel sounds are normal. He exhibits no distension, no fluid wave, no abdominal bruit and no mass. There is no hepatosplenomegaly. There is generalized tenderness. There is no rigidity, no rebound, no guarding, no CVA tenderness and negative Walton's sign. No hernia.   Musculoskeletal: Normal range of motion. He exhibits no edema, tenderness or deformity.   Neurological: He is alert and oriented to person, place, and time. He has normal strength. No cranial nerve deficit or sensory deficit.   Skin: Skin is warm, dry and intact. Capillary refill takes less than 2 seconds. No rash noted. He is not diaphoretic. No cyanosis or erythema.   Psychiatric: He has a normal mood and affect. His speech is normal and behavior is normal. Cognition and memory are normal.   Nursing note and vitals reviewed.          Significant Labs:   Results for orders placed or performed during the hospital encounter of 01/25/19   CBC W/ AUTO DIFFERENTIAL   Result Value Ref Range    WBC 11.08 3.90 - 12.70 K/uL    RBC 5.22 4.60 - 6.20 M/uL    Hemoglobin 16.4 14.0 - 18.0 g/dL    Hematocrit 46.1 40.0 - 54.0 %    MCV 88 82 - 98 fL    MCH 31.4 (H) 27.0 - 31.0 pg    MCHC 35.6 32.0 - 36.0 g/dL    RDW 12.7 11.5 - 14.5 %    Platelets 230 150 - 350 K/uL    MPV 10.7 9.2 - 12.9 fL    Gran # (ANC) 7.5 1.8 - 7.7 K/uL    Lymph # 1.8 1.0 - 4.8 K/uL    Mono # 1.6 (H) 0.3 - 1.0 K/uL    Eos # 0.0 0.0 - 0.5 K/uL    Baso # 0.05 0.00 - 0.20 K/uL    Gran% 68.0 38.0 - 73.0 %    Lymph% 16.6 (L) 18.0 - 48.0 %    Mono% 14.8 4.0 - 15.0 %    Eosinophil% 0.1 0.0 - 8.0 %    Basophil% 0.5 0.0 - 1.9 %    Differential Method Automated    Comp. Metabolic Panel   Result Value Ref Range    Sodium 131 (L) 136 - 145 mmol/L    Potassium 3.6 3.5 - 5.1 mmol/L    Chloride 95 95 - 110 mmol/L    CO2 17 (L) 23 - 29 mmol/L    Glucose 172 (H) 70 - 110 mg/dL    BUN, Bld 85 (H) 8 - 23 mg/dL    Creatinine 8.4 (H) 0.5 - 1.4 mg/dL    Calcium  9.0 8.7 - 10.5 mg/dL    Total Protein 8.1 6.0 - 8.4 g/dL    Albumin 4.2 3.5 - 5.2 g/dL    Total Bilirubin 0.9 0.1 - 1.0 mg/dL    Alkaline Phosphatase 61 55 - 135 U/L    AST 25 10 - 40 U/L    ALT 40 10 - 44 U/L    Anion Gap 19 (H) 8 - 16 mmol/L    eGFR if African American 7.0 (A) >60 mL/min/1.73 m^2    eGFR if non African American 6.0 (A) >60 mL/min/1.73 m^2   Lipase   Result Value Ref Range    Lipase 60 4 - 60 U/L   Urinalysis, Reflex to Urine Culture Urine, Clean Catch   Result Value Ref Range    Specimen UA Urine, Clean Catch     Color, UA Yellow Yellow, Straw, Kyra    Appearance, UA Cloudy (A) Clear    pH, UA 5.0 5.0 - 8.0    Specific Gravity, UA 1.025 1.005 - 1.030    Protein, UA Trace (A) Negative    Glucose, UA Negative Negative    Ketones, UA Negative Negative    Bilirubin (UA) Negative Negative    Occult Blood UA 1+ (A) Negative    Nitrite, UA Negative Negative    Urobilinogen, UA Negative <2.0 EU/dL    Leukocytes, UA Negative Negative   CK   Result Value Ref Range     20 - 200 U/L   Urinalysis Microscopic   Result Value Ref Range    RBC, UA 2 0 - 4 /hpf    WBC, UA 3 0 - 5 /hpf    Bacteria, UA Few (A) None-Occ /hpf    Hyaline Casts, UA 3 (A) 0-1/lpf /lpf    Microscopic Comment SEE COMMENT       All pertinent labs within the past 24 hours have been reviewed.    Significant Imaging:   Imaging Results          CT Abdomen Pelvis  Without Contrast (Final result)  Result time 01/25/19 12:32:20    Final result by Bradly Zuniga III, MD (01/25/19 12:32:20)                 Impression:      Dilated mid to distal small bowel with air-fluid levels possibly representing low-grade partial small bowel obstruction or enteritis.  No obstructing lesion, inflammatory changes, free air or suspicious fluid collections identified.  No other acute disease identified.  Chronic findings, as above.    All CT scans at this facility are performed  using dose modulation techniques as appropriate to performed exam including the  following:  automated exposure control; adjustment of mA and/or kV according to the patients size (this includes techniques or standardized protocols for targeted exams where dose is matched to indication/reason for exam: i.e. extremities or head);  iterative reconstruction technique.      Electronically signed by: Bradly Zuniga MD  Date:    01/25/2019  Time:    12:32             Narrative:    EXAMINATION:  CT ABDOMEN PELVIS WITHOUT CONTRAST    CLINICAL HISTORY:  Abdominal pain, gastroenteritis or colitis suspected;    TECHNIQUE:  Routine CT abdomen and pelvis performed without IV contrast.  Oral contrast was administered.  Coronal and sagittal reformatted images obtained.    COMPARISON:  Abdominal CT December 2, 2017    FINDINGS:  No acute disease identified in the lung bases.  Stable chronic bilateral L5 spondylolysis with grade 1 spondylolisthesis.  No acute osseous abnormality or suspicious bone lesion identified.    There is mild mid to distal small bowel dilation with air-fluid levels.  There may be a gradual transition point to partially collapsed small bowel in the right upper quadrant.  No obstructing lesion is identified.  No wall thickening or inflammatory changes identified.  There is oral contrast extending into the more distal small bowel and colon to the level of the sigmoid the without evidence of complete or high-grade obstruction.  There are scattered colonic diverticula without evidence of acute diverticulitis.  The remainder of the bowel is within normal limits.  The appendix is normal.  No free air, pneumatosis, free fluid or abscess identified.  There is identified complicated fat containing left inguinal hernia, unchanged.    There is a single punctate nonobstructing left renal calculus.  The kidneys and ureters are otherwise unremarkable without hydronephrosis.  There is persistent marked prostate enlargement with mass effect upon the adjacent bladder.  The bladder is incompletely distended  with mild bladder wall thickening which appears similar to prior exam.    There is diffuse fatty infiltration of the liver.  The unenhanced liver is otherwise unremarkable.  Prior cholecystectomy is again noted.  The unenhanced pancreas, spleen and adrenals are unremarkable. The aorta is normal in caliber.  No pathologically enlarged lymph nodes identified.                               X-Ray Abdomen Flat And Erect (Final result)  Result time 01/25/19 09:55:29    Final result by Bradly Zuniga III, MD (01/25/19 09:55:29)                 Impression:      Dilated small bowel suspicious for small bowel obstruction..      Electronically signed by: Bradly Zuniga MD  Date:    01/25/2019  Time:    09:55             Narrative:    EXAMINATION:  XR ABDOMEN FLAT AND ERECT    CLINICAL HISTORY:  Abdominal Pain;    COMPARISON:  07/30/2018    FINDINGS:  There are dilated loops of small bowel in the upper and mid abdomen measuring up to 4.4 cm in diameter with air-fluid levels suspicious for small bowel obstruction, new since prior exam.  The colon is nondistended.  No definite free air is seen. Cholecystectomy clips are noted.  Bilateral pelvic phleboliths are again noted..  No radiographic evidence of urolithiasis.                               I have reviewed all pertinent imaging results/findings within the past 24 hours.

## 2019-01-26 NOTE — SUBJECTIVE & OBJECTIVE
"Interval History: Pt was seen and examined. No new events last pm, no c/o's, still with slight diarrhea, "much better." No abd pain, no fever. Pt reports no problems eating, finished breakfast.    Review of patient's allergies indicates:   Allergen Reactions    Augmentin [amoxicillin-pot clavulanate] Rash    Pcn [penicillins] Rash     Current Facility-Administered Medications   Medication Frequency    0.9%  NaCl infusion Continuous    heparin (porcine) injection 5,000 Units Q8H    ondansetron injection 4 mg Q6H PRN    pantoprazole EC tablet 40 mg Daily    promethazine (PHENERGAN) 6.25 mg in dextrose 5 % 50 mL IVPB Q6H PRN    sodium chloride 0.9% flush 5 mL PRN    tamsulosin 24 hr capsule 0.4 mg Daily       Objective:     Vital Signs (Most Recent):  Temp: 97.7 °F (36.5 °C) (01/26/19 0733)  Pulse: (!) 55 (01/26/19 1108)  Resp: 18 (01/26/19 0733)  BP: 103/60 (01/26/19 0733)  SpO2: 95 % (01/26/19 0733)  O2 Device (Oxygen Therapy): room air (01/26/19 0733) Vital Signs (24h Range):  Temp:  [97.4 °F (36.3 °C)-98.7 °F (37.1 °C)] 97.7 °F (36.5 °C)  Pulse:  [51-68] 55  Resp:  [16-20] 18  SpO2:  [94 %-97 %] 95 %  BP: (100-130)/(52-66) 103/60     Weight: 89.6 kg (197 lb 8.5 oz) (01/25/19 0852)  Body mass index is 26.79 kg/m².  Body surface area is 2.13 meters squared.    I/O last 3 completed shifts:  In: 2000 [IV Piggyback:2000]  Out: 771 [Urine:771]    Physical Exam   Constitutional: He is oriented to person, place, and time. He appears well-developed and well-nourished. No distress.   HENT:   Head: Normocephalic.   Cardiovascular: Normal rate, regular rhythm and normal heart sounds.   Pulmonary/Chest: Effort normal and breath sounds normal. No respiratory distress. He has no rales.   Abdominal: Soft. He exhibits no distension. There is no tenderness. There is no guarding.   Musculoskeletal: He exhibits no edema.   Neurological: He is alert and oriented to person, place, and time.   Skin: Skin is warm and dry. "   Psychiatric: He has a normal mood and affect. His behavior is normal. Thought content normal.   Nursing note and vitals reviewed.      Significant Labs: reviewed  BMP  Lab Results   Component Value Date     01/26/2019    K 4.2 01/26/2019     01/26/2019    CO2 20 (L) 01/26/2019    BUN 70 (H) 01/26/2019    CREATININE 2.4 (H) 01/26/2019    CALCIUM 8.6 (L) 01/26/2019    ANIONGAP 10 01/26/2019    ESTGFRAFRICA 32 (A) 01/26/2019    EGFRNONAA 27 (A) 01/26/2019     Lab Results   Component Value Date    WBC 6.62 01/26/2019    HGB 14.6 01/26/2019    HCT 41.2 01/26/2019    MCV 90 01/26/2019     01/26/2019         Significant Imaging: reviewed

## 2019-01-26 NOTE — PROGRESS NOTES
"[facility] -   Nephrology  Progress Note    Patient Name: Delfino Monk  MRN: [id number]  Admission Date: 1/25/2019  Hospital Length of Stay: 1 days  Attending Provider: Johnathan Gomes, *   Primary Care Physician: Edgard Mendenhall MD  Principal Problem:BERTIN (acute kidney injury)    Subjective:     HPI: Pt was seen and examined. H/o was reviewed. Pt is a 64 y/o male who presented with 5 day h/o of severe diarrhea. S Cr has worsened from normal to >8 mg/dl. Pt denies taking NSAIds. Has a h/o of hTN and also was taking benicar. Pt had tried to replace fluid losses. Starting today, he could not urinate and pt presented to ER. Pt's 2 year old grandson also recently had diarrhea. Pt has had multiple water BM's (7-8/day), non-bloody, no fever, no abd pain. Currently feeling well, no new c/o's. PMH reviewed with him. Pt also has a h/o of nephrolithiasis in Dec 2017, stone was removed, 1.2 cm. Interestingly pt's 85 y/o father also passes a stone 2 weeks after pt passed had his. No h/o of cystine stones (familital), diet was explored, no food fetishes in pt or his father. Records reviewed, could not find stone analysis results. No stones before or after that episode.    Interval History: Pt was seen and examined. No new events last pm, no c/o's, still with slight diarrhea, "much better." No abd pain, no fever. Pt reports no problems eating, finished breakfast.    Review of patient's allergies indicates:   Allergen Reactions    Augmentin [amoxicillin-pot clavulanate] Rash    Pcn [penicillins] Rash     Current Facility-Administered Medications   Medication Frequency    0.9%  NaCl infusion Continuous    heparin (porcine) injection 5,000 Units Q8H    ondansetron injection 4 mg Q6H PRN    pantoprazole EC tablet 40 mg Daily    promethazine (PHENERGAN) 6.25 mg in dextrose 5 % 50 mL IVPB Q6H PRN    sodium chloride 0.9% flush 5 mL PRN    tamsulosin 24 hr capsule 0.4 mg Daily       Objective: "     Vital Signs (Most Recent):  Temp: 97.7 °F (36.5 °C) (01/26/19 0733)  Pulse: (!) 55 (01/26/19 1108)  Resp: 18 (01/26/19 0733)  BP: 103/60 (01/26/19 0733)  SpO2: 95 % (01/26/19 0733)  O2 Device (Oxygen Therapy): room air (01/26/19 0733) Vital Signs (24h Range):  Temp:  [97.4 °F (36.3 °C)-98.7 °F (37.1 °C)] 97.7 °F (36.5 °C)  Pulse:  [51-68] 55  Resp:  [16-20] 18  SpO2:  [94 %-97 %] 95 %  BP: (100-130)/(52-66) 103/60     Weight: 89.6 kg (197 lb 8.5 oz) (01/25/19 0852)  Body mass index is 26.79 kg/m².  Body surface area is 2.13 meters squared.    I/O last 3 completed shifts:  In: 2000 [IV Piggyback:2000]  Out: 771 [Urine:771]    Physical Exam   Constitutional: He is oriented to person, place, and time. He appears well-developed and well-nourished. No distress.   HENT:   Head: Normocephalic.   Cardiovascular: Normal rate, regular rhythm and normal heart sounds.   Pulmonary/Chest: Effort normal and breath sounds normal. No respiratory distress. He has no rales.   Abdominal: Soft. He exhibits no distension. There is no tenderness. There is no guarding.   Musculoskeletal: He exhibits no edema.   Neurological: He is alert and oriented to person, place, and time.   Skin: Skin is warm and dry.   Psychiatric: He has a normal mood and affect. His behavior is normal. Thought content normal.   Nursing note and vitals reviewed.      Significant Labs: reviewed  BMP  Lab Results   Component Value Date     01/26/2019    K 4.2 01/26/2019     01/26/2019    CO2 20 (L) 01/26/2019    BUN 70 (H) 01/26/2019    CREATININE 2.4 (H) 01/26/2019    CALCIUM 8.6 (L) 01/26/2019    ANIONGAP 10 01/26/2019    ESTGFRAFRICA 32 (A) 01/26/2019    EGFRNONAA 27 (A) 01/26/2019     Lab Results   Component Value Date    WBC 6.62 01/26/2019    HGB 14.6 01/26/2019    HCT 41.2 01/26/2019    MCV 90 01/26/2019     01/26/2019         Significant Imaging: reviewed    Assessment/Plan:         62 y/o male with BERTIN and diarrhea:         BERTIN (acute  kidney injury)     BERTIN likely due to diarrhea causing decreased renal perfusion  s Cr much lower today  BERTIN resolving uneventfully  K is not low  Mild metabolic acidosis (non AG), c/w diarrhea  Hypotension as expected with fluid losses, BP improved today  Tolerating IVF's well  Ate breakfast without problems   Agree with current mgmt     Diarrhea: acute, appears resolving  Afebrile   WBC not high  Acute gastroenteritis  Likely viral  Sick contacts likely were 2 kids at home, grandchildren, 2 and 9 years old, both with diarrhea.  Per wife, the kids are doing well      Nephrolithiasis     Had a large kidney stone 1.2 cm in Dec 2017  No acute issues  To review:  Pt's father had also a kidney stone 2 weeks later, unusual!  No h/o of cystine stone (hereditary)  No family food fetishes identified that would predispose 2 family members to kidney stones  Kidney stones in general discussed with pt  The stone had been sent for analysis, could not find the results in epic.         Plans and recommendations:  As discussed above  OK to d/c home from our view  May send pt to renal clinic for post hospital f/u for BERTIN and for kidney stone risk stratification.           Wilian Shepherd MD  Nephrology  [facility]

## 2019-01-26 NOTE — ASSESSMENT & PLAN NOTE
-Improving   - Avoid nephrotoxic agents.  Hold ARB.  - Strict I&O's.  - Follow labs.  - Nephrology consult.

## 2019-01-26 NOTE — PLAN OF CARE
Problem: Adult Inpatient Plan of Care  Goal: Plan of Care Review  Outcome: Ongoing (interventions implemented as appropriate)  Patient to be discharged tomorrow. Denies N/V. Denies pain. Tolerating clear liquid and full liquid, advanced to renal diet for dinner, per orders. Cardiac monitoring in place. Sinus xiang in the 50s. Chart check complete. Will continue to monitor.

## 2019-01-26 NOTE — H&P
"[facility] Hospital Medicine  History & Physical    Patient Name: Delfino Monk  MRN: [id number]  Admission Date: 1/25/2019  Attending Physician: Bebeto Sommers MD   Primary Care Provider: Edgard Mendenhall MD         Patient information was obtained from patient, past medical records and ER records.     Subjective:     Principal Problem:BERTIN (acute kidney injury)    Chief Complaint:   Chief Complaint   Patient presents with    Diarrhea     Since Monday. Fatigue.         HPI: Mr. Monk is a 64 yo male with a PMHx of HTN and BPH, who presented to the ED with c/o diarrhea x 5 days with ~5-10 watery episodes per day.  Associated generalized ABD pain, decreased UOP, and flatus.  Patient reports no diarrhea today, and has not voided in the past 2 days.  No aggravating or alleviating factors.  Denies any ABD distention, decreased appetite, N/V, constipation, melena, hematochezia, hematemesis, dysuria, hematuria, pelvic pain, flank pain, back pain, CP, SOB, lightheadedness, dizziness, syncope, AMS, fever, or chills.  No recent travel.  Patient's grandchildren recently had "stomach virus" with similar symptoms.  Upon arrival to ED, patient was notably hypotensive with BP 72/44.  He received IV fluid resuscitation with good BP response.  Initial work-up in ED resulted cr. 8.4, BUN 85, Na 131, K 3.6, WBC 11, TBili 0.9, normal lipase and LFTs.  CT of ABD/Pelvis showed dilated mid to distal small bowel with air-fluid levels possibly representing low-grade partial small bowel obstruction or enteritis.  Hospital Medicine was called for admission.  Case discussed with Nephrology who agree with current treatment.  Currently, patient appears comfortable in NAD.  Denies any diarrhea since arrival to ED.  BP remains stable.       Past Medical History:   Diagnosis Date    BPH (benign prostatic hyperplasia)     HTN (hypertension)        Past Surgical History:   Procedure Laterality Date    bilateral lap " inguinal hernia repair      CHOLECYSTECTOMY  2016    CYSTOSCOPY WITH STENT PLACEMENT Right 12/12/2017    Performed by [facility]ly Green IV, [facility] at [facility]    EXTRACTION-STONE-URETEROSCOPY Right 12/12/2017    Performed by [facility]ly Green IV, [facility] at [facility] OR    HERNIA REPAIR      left ankle      REPAIR-HERNIA-INGUINAL RECURRENT Left 9/4/2015    Performed by Luciano Murphy MD at [facility] OR    TONSILLECTOMY, ADENOIDECTOMY         Review of patient's allergies indicates:   Allergen Reactions    Augmentin [amoxicillin-pot clavulanate] Rash    Pcn [penicillins] Rash       No current facility-administered medications on file prior to encounter.      Current Outpatient Medications on File Prior to Encounter   Medication Sig    aspirin (ECOTRIN) 81 MG EC tablet Take 81 mg by mouth once daily.    finasteride (PROSCAR) 5 mg tablet Take 1 tablet (5 mg total) by mouth once daily.    olmesartan (BENICAR) 20 MG tablet Take 20 mg by mouth every evening.     tamsulosin (FLOMAX) 0.4 mg Cap Take 1 capsule (0.4 mg total) by mouth once daily.     Family History     Reviewed and not pertinent.         Tobacco Use    Smoking status: Never Smoker    Smokeless tobacco: Never Used   Substance and Sexual Activity    Alcohol use: No     Alcohol/week: 0.0 oz    Drug use: No    Sexual activity: Not on file     Review of Systems   Constitutional: Negative for appetite change, chills, diaphoresis, fatigue, fever and unexpected weight change.   HENT: Negative for congestion, postnasal drip, rhinorrhea, sinus pressure and sore throat.    Respiratory: Negative for cough, chest tightness, shortness of breath and wheezing.    Cardiovascular: Negative for chest pain, palpitations and leg swelling.   Gastrointestinal: Positive for abdominal pain (generalized) and diarrhea. Negative for abdominal distention, anal bleeding, blood in stool, constipation, nausea and vomiting.        + Flatus.   Genitourinary: Positive for decreased urine  volume. Negative for dysuria, flank pain, frequency, hematuria and urgency.   Musculoskeletal: Negative for arthralgias, back pain and myalgias.   Skin: Negative for pallor, rash and wound.   Neurological: Negative for dizziness, tremors, syncope, weakness, light-headedness, numbness and headaches.   Psychiatric/Behavioral: Negative for confusion. The patient is not nervous/anxious.    All other systems reviewed and are negative.    Objective:     Vital Signs (Most Recent):  Temp: 97.6 °F (36.4 °C) (01/25/19 1929)  Pulse: 63 (01/25/19 1929)  Resp: 18 (01/25/19 1929)  BP: 130/66 (01/25/19 1929)  SpO2: 96 % (01/25/19 1929) Vital Signs (24h Range):  Temp:  [97.6 °F (36.4 °C)-98.7 °F (37.1 °C)] 97.6 °F (36.4 °C)  Pulse:  [60-83] 63  Resp:  [16-20] 18  SpO2:  [95 %-98 %] 96 %  BP: ()/(44-66) 130/66     Weight: 89.6 kg (197 lb 8.5 oz)  Body mass index is 26.79 kg/m².    Physical Exam   Constitutional: He is oriented to person, place, and time. He appears well-developed and well-nourished. He appears ill. No distress.   HENT:   Head: Normocephalic and atraumatic.   Mouth/Throat: Mucous membranes are dry.   Eyes: Conjunctivae are normal.   PERRL; EOM intact.   Neck: Normal range of motion. Neck supple.   Cardiovascular: Normal rate, regular rhythm, S1 normal, S2 normal and intact distal pulses.  No extrasystoles are present. Exam reveals no gallop.   No murmur heard.  Pulses:       Radial pulses are 2+ on the right side, and 2+ on the left side.        Dorsalis pedis pulses are 2+ on the right side, and 2+ on the left side.        Posterior tibial pulses are 2+ on the right side, and 2+ on the left side.   Pulmonary/Chest: Effort normal and breath sounds normal. No accessory muscle usage. No tachypnea. No respiratory distress. He has no wheezes. He has no rhonchi. He has no rales.   Abdominal: Soft. Bowel sounds are normal. He exhibits no distension, no fluid wave, no abdominal bruit and no mass. There is no  hepatosplenomegaly. There is generalized tenderness. There is no rigidity, no rebound, no guarding, no CVA tenderness and negative Walton's sign. No hernia.   Musculoskeletal: Normal range of motion. He exhibits no edema, tenderness or deformity.   Neurological: He is alert and oriented to person, place, and time. He has normal strength. No cranial nerve deficit or sensory deficit.   Skin: Skin is warm, dry and intact. Capillary refill takes less than 2 seconds. No rash noted. He is not diaphoretic. No cyanosis or erythema.   Psychiatric: He has a normal mood and affect. His speech is normal and behavior is normal. Cognition and memory are normal.   Nursing note and vitals reviewed.          Significant Labs:   Results for orders placed or performed during the hospital encounter of 01/25/19   CBC W/ AUTO DIFFERENTIAL   Result Value Ref Range    WBC 11.08 3.90 - 12.70 K/uL    RBC 5.22 4.60 - 6.20 M/uL    Hemoglobin 16.4 14.0 - 18.0 g/dL    Hematocrit 46.1 40.0 - 54.0 %    MCV 88 82 - 98 fL    MCH 31.4 (H) 27.0 - 31.0 pg    MCHC 35.6 32.0 - 36.0 g/dL    RDW 12.7 11.5 - 14.5 %    Platelets 230 150 - 350 K/uL    MPV 10.7 9.2 - 12.9 fL    Gran # (ANC) 7.5 1.8 - 7.7 K/uL    Lymph # 1.8 1.0 - 4.8 K/uL    Mono # 1.6 (H) 0.3 - 1.0 K/uL    Eos # 0.0 0.0 - 0.5 K/uL    Baso # 0.05 0.00 - 0.20 K/uL    Gran% 68.0 38.0 - 73.0 %    Lymph% 16.6 (L) 18.0 - 48.0 %    Mono% 14.8 4.0 - 15.0 %    Eosinophil% 0.1 0.0 - 8.0 %    Basophil% 0.5 0.0 - 1.9 %    Differential Method Automated    Comp. Metabolic Panel   Result Value Ref Range    Sodium 131 (L) 136 - 145 mmol/L    Potassium 3.6 3.5 - 5.1 mmol/L    Chloride 95 95 - 110 mmol/L    CO2 17 (L) 23 - 29 mmol/L    Glucose 172 (H) 70 - 110 mg/dL    BUN, Bld 85 (H) 8 - 23 mg/dL    Creatinine 8.4 (H) 0.5 - 1.4 mg/dL    Calcium 9.0 8.7 - 10.5 mg/dL    Total Protein 8.1 6.0 - 8.4 g/dL    Albumin 4.2 3.5 - 5.2 g/dL    Total Bilirubin 0.9 0.1 - 1.0 mg/dL    Alkaline Phosphatase 61 55 - 135 U/L     AST 25 10 - 40 U/L    ALT 40 10 - 44 U/L    Anion Gap 19 (H) 8 - 16 mmol/L    eGFR if African American 7.0 (A) >60 mL/min/1.73 m^2    eGFR if non African American 6.0 (A) >60 mL/min/1.73 m^2   Lipase   Result Value Ref Range    Lipase 60 4 - 60 U/L   Urinalysis, Reflex to Urine Culture Urine, Clean Catch   Result Value Ref Range    Specimen UA Urine, Clean Catch     Color, UA Yellow Yellow, Straw, Kyra    Appearance, UA Cloudy (A) Clear    pH, UA 5.0 5.0 - 8.0    Specific Gravity, UA 1.025 1.005 - 1.030    Protein, UA Trace (A) Negative    Glucose, UA Negative Negative    Ketones, UA Negative Negative    Bilirubin (UA) Negative Negative    Occult Blood UA 1+ (A) Negative    Nitrite, UA Negative Negative    Urobilinogen, UA Negative <2.0 EU/dL    Leukocytes, UA Negative Negative   CK   Result Value Ref Range     20 - 200 U/L   Urinalysis Microscopic   Result Value Ref Range    RBC, UA 2 0 - 4 /hpf    WBC, UA 3 0 - 5 /hpf    Bacteria, UA Few (A) None-Occ /hpf    Hyaline Casts, UA 3 (A) 0-1/lpf /lpf    Microscopic Comment SEE COMMENT       All pertinent labs within the past 24 hours have been reviewed.    Significant Imaging:   Imaging Results          CT Abdomen Pelvis  Without Contrast (Final result)  Result time 01/25/19 12:32:20    Final result by Bradly Zuniga III, MD (01/25/19 12:32:20)                 Impression:      Dilated mid to distal small bowel with air-fluid levels possibly representing low-grade partial small bowel obstruction or enteritis.  No obstructing lesion, inflammatory changes, free air or suspicious fluid collections identified.  No other acute disease identified.  Chronic findings, as above.    All CT scans at this facility are performed  using dose modulation techniques as appropriate to performed exam including the following:  automated exposure control; adjustment of mA and/or kV according to the patients size (this includes techniques or standardized protocols for targeted  exams where dose is matched to indication/reason for exam: i.e. extremities or head);  iterative reconstruction technique.      Electronically signed by: Bradly Zuniga MD  Date:    01/25/2019  Time:    12:32             Narrative:    EXAMINATION:  CT ABDOMEN PELVIS WITHOUT CONTRAST    CLINICAL HISTORY:  Abdominal pain, gastroenteritis or colitis suspected;    TECHNIQUE:  Routine CT abdomen and pelvis performed without IV contrast.  Oral contrast was administered.  Coronal and sagittal reformatted images obtained.    COMPARISON:  Abdominal CT December 2, 2017    FINDINGS:  No acute disease identified in the lung bases.  Stable chronic bilateral L5 spondylolysis with grade 1 spondylolisthesis.  No acute osseous abnormality or suspicious bone lesion identified.    There is mild mid to distal small bowel dilation with air-fluid levels.  There may be a gradual transition point to partially collapsed small bowel in the right upper quadrant.  No obstructing lesion is identified.  No wall thickening or inflammatory changes identified.  There is oral contrast extending into the more distal small bowel and colon to the level of the sigmoid the without evidence of complete or high-grade obstruction.  There are scattered colonic diverticula without evidence of acute diverticulitis.  The remainder of the bowel is within normal limits.  The appendix is normal.  No free air, pneumatosis, free fluid or abscess identified.  There is identified complicated fat containing left inguinal hernia, unchanged.    There is a single punctate nonobstructing left renal calculus.  The kidneys and ureters are otherwise unremarkable without hydronephrosis.  There is persistent marked prostate enlargement with mass effect upon the adjacent bladder.  The bladder is incompletely distended with mild bladder wall thickening which appears similar to prior exam.    There is diffuse fatty infiltration of the liver.  The unenhanced liver is otherwise  unremarkable.  Prior cholecystectomy is again noted.  The unenhanced pancreas, spleen and adrenals are unremarkable. The aorta is normal in caliber.  No pathologically enlarged lymph nodes identified.                               X-Ray Abdomen Flat And Erect (Final result)  Result time 01/25/19 09:55:29    Final result by Bradly Zuniga III, MD (01/25/19 09:55:29)                 Impression:      Dilated small bowel suspicious for small bowel obstruction..      Electronically signed by: Bradly Zuniga MD  Date:    01/25/2019  Time:    09:55             Narrative:    EXAMINATION:  XR ABDOMEN FLAT AND ERECT    CLINICAL HISTORY:  Abdominal Pain;    COMPARISON:  07/30/2018    FINDINGS:  There are dilated loops of small bowel in the upper and mid abdomen measuring up to 4.4 cm in diameter with air-fluid levels suspicious for small bowel obstruction, new since prior exam.  The colon is nondistended.  No definite free air is seen. Cholecystectomy clips are noted.  Bilateral pelvic phleboliths are again noted..  No radiographic evidence of urolithiasis.                               I have reviewed all pertinent imaging results/findings within the past 24 hours.             Assessment/Plan:     * BERTIN secondary to intravascular volume depletion    - Initial cr. 8.4, BUN 85, potassium stable.  - IV fluid resuscitation given in ED.  Continuous IVF's.  - Avoid nephrotoxic agents.  Hold ARB.  - Strict I&O's.  - Follow labs.  - Nephrology consult.     Acute gastroenteritis    - Likely viral.  - Continue supportive care.     Essential hypertension    - Hypotensive in ED.  BP responded well to IVF's and remains stable.  - Hold all antihypertensives, monitor BP trends and resume as needed.       VTE Risk Mitigation (From admission, onward)        Ordered     heparin (porcine) injection 5,000 Units  Every 8 hours      01/26/19 0003     Place sequential compression device  Until discontinued      01/26/19 0003     IP VTE LOW RISK  PATIENT  Once      01/25/19 5793             Rosalia Sanchez NP  Department of Hospital Medicine   [facility]

## 2019-01-26 NOTE — HPI
Pt was seen and examined. H/o was reviewed. Pt is a 62 y/o male who presented with 5 day h/o of severe diarrhea. S Cr has worsened from normal to >8 mg/dl. Pt denies taking NSAIds. Has a h/o of hTN and also was taking benicar. Pt had tried to replace fluid losses. Starting today, he could not urinate and pt presented to ER. Pt's 2 year old grandson also recently had diarrhea. Pt has had multiple water BM's (7-8/day), non-bloody, no fever, no abd pain. Currently feeling well, no new c/o's. PMH reviewed with him. Pt also has a h/o of nephrolithiasis in Dec 2017, stone was removed, 1.2 cm. Interestingly pt's 85 y/o father also passes a stone 2 weeks after pt passed had his. No h/o of cystine stones (familital), diet was explored, no food fetishes in pt or his father. Records reviewed, could not find stone analysis results. No stones before or after that episode.

## 2019-01-26 NOTE — PLAN OF CARE
Problem: Adult Inpatient Plan of Care  Goal: Plan of Care Review  Outcome: Ongoing (interventions implemented as appropriate)  Patient remained free from injury. IVF maintained. No s/s of acute distress. 12hr chart check complete.

## 2019-01-26 NOTE — HPI
"Mr. Monk is a 62 yo male with a PMHx of HTN and BPH, who presented to the ED with c/o diarrhea x 5 days with ~5-10 watery episodes per day.  Associated generalized ABD pain, decreased UOP, and flatus.  Patient reports no diarrhea today, and has not voided in the past 2 days.  No aggravating or alleviating factors.  Denies any ABD distention, decreased appetite, N/V, constipation, melena, hematochezia, hematemesis, dysuria, hematuria, pelvic pain, flank pain, back pain, CP, SOB, lightheadedness, dizziness, syncope, AMS, fever, or chills.  No recent travel.  Patient's grandchildren recently had "stomach virus" with similar symptoms.  Upon arrival to ED, patient was notably hypotensive with BP 72/44.  He received IV fluid resuscitation with good BP response.  Initial work-up in ED resulted cr. 8.4, BUN 85, Na 131, K 3.6, WBC 11, TBili 0.9, normal lipase and LFTs.  CT of ABD/Pelvis showed dilated mid to distal small bowel with air-fluid levels possibly representing low-grade partial small bowel obstruction or enteritis.  Hospital Medicine was called for admission.  Case discussed with Nephrology who agree with current treatment.  Currently, patient appears comfortable in NAD.  Denies any diarrhea since arrival to ED.  BP remains stable.     "

## 2019-01-26 NOTE — ASSESSMENT & PLAN NOTE
- Hypotensive in ED.  BP responded well to IVF's and remains stable.  - Hold all antihypertensives, monitor BP trends and resume as needed.

## 2019-01-26 NOTE — CONSULTS
[facility] -   Nephrology  Consult Note      Patient Name: Delfino Monk  MRN: [id number]  Admission Date: 1/25/2019  Hospital Length of Stay: 0 days  Attending Provider: Yuki Galindo MD   Primary Care Physician: Edgard Mendenhall MD  Principal Problem: diarrhea    Reason for consult: BERTIN  Referring physician: Dr Galindo    Consults  Subjective:     HPI: Pt was seen and examined. H/o was reviewed. Pt is a 62 y/o male who presented with 5 day h/o of severe diarrhea. S Cr has worsened from normal to >8 mg/dl. Pt denies taking NSAIds. Has a h/o of hTN and also was taking benicar. Pt had tried to replace fluid losses. Starting today, he could not urinate and pt presented to ER. Pt's 2 year old grandson also recently had diarrhea. Pt has had multiple water BM's (7-8/day), non-bloody, no fever, no abd pain. Currently feeling well, no new c/o's. PMH reviewed with him. Pt also has a h/o of nephrolithiasis in Dec 2017, stone was removed, 1.2 cm. Interestingly pt's 83 y/o father also passes a stone 2 weeks after pt passed had his. No h/o of cystine stones (familital), diet was explored, no food fetishes in pt or his father. Records reviewed, could not find stone analysis results. No stones before or after that episode.    Past Medical History:   Diagnosis Date    BPH (benign prostatic hyperplasia)     HTN (hypertension)        Past Surgical History:   Procedure Laterality Date    bilateral lap inguinal hernia repair      CHOLECYSTECTOMY  2016    CYSTOSCOPY WITH STENT PLACEMENT Right 12/12/2017    Performed by Bradly Green IV, MD at [facility] OR    EXTRACTION-STONE-URETEROSCOPY Right 12/12/2017    Performed by Bradly Green IV, MD at [facility] OR    HERNIA REPAIR      left ankle      REPAIR-HERNIA-INGUINAL RECURRENT Left 9/4/2015    Performed by Luciano Murphy MD at [facility] OR    TONSILLECTOMY, ADENOIDECTOMY         Review of patient's allergies indicates:   Allergen Reactions    Augmentin  [amoxicillin-pot clavulanate] Rash    Pcn [penicillins] Rash     Current Facility-Administered Medications   Medication Frequency    0.9%  NaCl infusion Continuous     Family History     None        Tobacco Use    Smoking status: Never Smoker    Smokeless tobacco: Never Used   Substance and Sexual Activity    Alcohol use: No     Alcohol/week: 0.0 oz    Drug use: Not on file    Sexual activity: Not on file     Review of Systems   Constitutional: Negative.    HENT: Negative.    Cardiovascular: Negative.    Gastrointestinal: Positive for diarrhea.   Genitourinary: Negative.    Musculoskeletal: Negative.    Neurological: Negative.    Psychiatric/Behavioral: Negative.      Objective:     Vital Signs (Most Recent):  Temp: 97.6 °F (36.4 °C) (01/25/19 1929)  Pulse: 63 (01/25/19 1929)  Resp: 18 (01/25/19 1929)  BP: 130/66 (01/25/19 1929)  SpO2: 96 % (01/25/19 1929)  O2 Device (Oxygen Therapy): room air (01/25/19 1929) Vital Signs (24h Range):  Temp:  [97.6 °F (36.4 °C)-98.7 °F (37.1 °C)] 97.6 °F (36.4 °C)  Pulse:  [60-83] 63  Resp:  [16-20] 18  SpO2:  [95 %-98 %] 96 %  BP: ()/(44-66) 130/66     Weight: 89.6 kg (197 lb 8.5 oz) (01/25/19 0852)  Body mass index is 26.79 kg/m².  Body surface area is 2.13 meters squared.    I/O last 3 completed shifts:  In: 2000 [IV Piggyback:2000]  Out: 660 [Urine:660]    Physical Exam   Constitutional: He is oriented to person, place, and time. He appears well-developed and well-nourished. No distress.   HENT:   Head: Normocephalic.   Cardiovascular: Normal rate, regular rhythm and normal heart sounds.   Pulmonary/Chest: Effort normal and breath sounds normal. No respiratory distress. He has no rales.   Abdominal: Soft. He exhibits no distension. There is no tenderness. There is no guarding.   Musculoskeletal: He exhibits no edema.   Neurological: He is alert and oriented to person, place, and time.   Skin: Skin is warm and dry.   Psychiatric: He has a normal mood and affect. His  behavior is normal. Thought content normal.   Nursing note and vitals reviewed.      Significant Labs: reviewed  BMP  Lab Results   Component Value Date     (L) 01/25/2019    K 3.6 01/25/2019    CL 95 01/25/2019    CO2 17 (L) 01/25/2019    BUN 85 (H) 01/25/2019    CREATININE 8.4 (H) 01/25/2019    CALCIUM 9.0 01/25/2019    ANIONGAP 19 (H) 01/25/2019    ESTGFRAFRICA 7.0 (A) 01/25/2019    EGFRNONAA 6.0 (A) 01/25/2019     Lab Results   Component Value Date    WBC 11.08 01/25/2019    HGB 16.4 01/25/2019    HCT 46.1 01/25/2019    MCV 88 01/25/2019     01/25/2019         Significant Imaging: reviewed    Assessment/Plan:   64 y/o male with BERTIN and diarrhea:    BERTIN (acute kidney injury)    BERTIN likely due to diarrhea causing decreased renal perfusion  Expect will recover uneventfully, pt was reassured  Baseline s Cr is normal  K normal  Acid base  Hypotension as expected with fluid losses, intravascular fluid contraction  On NS crystalloid IVF's,   Agree with current mgmt  Replace any K losses  Hold benicar    Diarrhea: acute  Afebrile   WBC not high  Appears to have already resolved  Acute gastroenteritis  Likely viral  Sick contact likely was pt's 2 year old grandson     Nephrolithiasis    Had a large kidney stone 1.2 cm in Dec 2017  Pt's father had also a kidney stone 2 weeks later, unusual!  No h/o of cystine stone (hereditary)  No family food fetishes identified that would predispose 2 family members to kidney stones  Kidney stones in general discussed with pt  The stone had been sent for analysis, could not find the results in epic.       Plans and recommendations:  As discussed above  Total time spent 70 minutes including time needed to review the records, the   patient evaluation, documentation, face-to-face discussion with the patient,   more than 50% of the time was spent on coordination of care and counseling.    Level V visit.      Wilian Shepherd MD   Nephrology  [facility]

## 2019-01-27 VITALS
OXYGEN SATURATION: 95 % | SYSTOLIC BLOOD PRESSURE: 116 MMHG | DIASTOLIC BLOOD PRESSURE: 65 MMHG | BODY MASS INDEX: 26.76 KG/M2 | TEMPERATURE: 98 F | RESPIRATION RATE: 18 BRPM | HEIGHT: 72 IN | HEART RATE: 63 BPM | WEIGHT: 197.56 LBS

## 2019-01-27 PROBLEM — N17.9 AKI (ACUTE KIDNEY INJURY): Status: RESOLVED | Noted: 2019-01-25 | Resolved: 2019-01-27

## 2019-01-27 LAB
ANION GAP SERPL CALC-SCNC: 7 MMOL/L
BASOPHILS # BLD AUTO: 0.02 K/UL
BASOPHILS NFR BLD: 0.3 %
BUN SERPL-MCNC: 26 MG/DL
CALCIUM SERPL-MCNC: 8.4 MG/DL
CHLORIDE SERPL-SCNC: 111 MMOL/L
CO2 SERPL-SCNC: 23 MMOL/L
CREAT SERPL-MCNC: 1 MG/DL
DIFFERENTIAL METHOD: ABNORMAL
EOSINOPHIL # BLD AUTO: 0.1 K/UL
EOSINOPHIL NFR BLD: 1.1 %
ERYTHROCYTE [DISTWIDTH] IN BLOOD BY AUTOMATED COUNT: 12.7 %
EST. GFR  (AFRICAN AMERICAN): >60 ML/MIN/1.73 M^2
EST. GFR  (NON AFRICAN AMERICAN): >60 ML/MIN/1.73 M^2
GLUCOSE SERPL-MCNC: 123 MG/DL
HCT VFR BLD AUTO: 39 %
HGB BLD-MCNC: 13.4 G/DL
LYMPHOCYTES # BLD AUTO: 1.8 K/UL
LYMPHOCYTES NFR BLD: 27.1 %
MAGNESIUM SERPL-MCNC: 2 MG/DL
MCH RBC QN AUTO: 31 PG
MCHC RBC AUTO-ENTMCNC: 34.4 G/DL
MCV RBC AUTO: 90 FL
MONOCYTES # BLD AUTO: 0.9 K/UL
MONOCYTES NFR BLD: 13.4 %
NEUTROPHILS # BLD AUTO: 3.8 K/UL
NEUTROPHILS NFR BLD: 58.4 %
PHOSPHATE SERPL-MCNC: 1.4 MG/DL
PLATELET # BLD AUTO: 174 K/UL
PMV BLD AUTO: 10.2 FL
POTASSIUM SERPL-SCNC: 4.3 MMOL/L
RBC # BLD AUTO: 4.32 M/UL
SODIUM SERPL-SCNC: 141 MMOL/L
WBC # BLD AUTO: 6.5 K/UL

## 2019-01-27 PROCEDURE — 36415 COLL VENOUS BLD VENIPUNCTURE: CPT

## 2019-01-27 PROCEDURE — 85025 COMPLETE CBC W/AUTO DIFF WBC: CPT

## 2019-01-27 PROCEDURE — 84100 ASSAY OF PHOSPHORUS: CPT

## 2019-01-27 PROCEDURE — 25000003 PHARM REV CODE 250: Performed by: NURSE PRACTITIONER

## 2019-01-27 PROCEDURE — 80048 BASIC METABOLIC PNL TOTAL CA: CPT

## 2019-01-27 PROCEDURE — 99233 SBSQ HOSP IP/OBS HIGH 50: CPT | Mod: ,,, | Performed by: INTERNAL MEDICINE

## 2019-01-27 PROCEDURE — 63600175 PHARM REV CODE 636 W HCPCS: Performed by: NURSE PRACTITIONER

## 2019-01-27 PROCEDURE — 83735 ASSAY OF MAGNESIUM: CPT

## 2019-01-27 PROCEDURE — 99233 PR SUBSEQUENT HOSPITAL CARE,LEVL III: ICD-10-PCS | Mod: ,,, | Performed by: INTERNAL MEDICINE

## 2019-01-27 RX ORDER — LOPERAMIDE HYDROCHLORIDE 2 MG/1
2 CAPSULE ORAL 4 TIMES DAILY PRN
Qty: 20 CAPSULE | Refills: 0 | Status: SHIPPED | OUTPATIENT
Start: 2019-01-27 | End: 2019-02-06

## 2019-01-27 RX ADMIN — HEPARIN SODIUM 5000 UNITS: 5000 INJECTION, SOLUTION INTRAVENOUS; SUBCUTANEOUS at 06:01

## 2019-01-27 RX ADMIN — TAMSULOSIN HYDROCHLORIDE 0.4 MG: 0.4 CAPSULE ORAL at 09:01

## 2019-01-27 RX ADMIN — PANTOPRAZOLE SODIUM 40 MG: 40 TABLET, DELAYED RELEASE ORAL at 09:01

## 2019-01-27 NOTE — PLAN OF CARE
01/27/19 1049   Final Note   Assessment Type Final Discharge Note   Anticipated Discharge Disposition Home   Right Care Referral Info   Post Acute Recommendation No Care

## 2019-01-27 NOTE — PLAN OF CARE
Problem: Adult Inpatient Plan of Care  Goal: Plan of Care Review  Outcome: Ongoing (interventions implemented as appropriate)  Patient remained free from injury. IVF maintained. Ambulates independently. Denies pain. No s/s of acute distress. 12hr chart check complete.

## 2019-01-27 NOTE — ASSESSMENT & PLAN NOTE
62 y/o male with BERTIN and diarrhea:           BERTIN (acute kidney injury)     BERTIN likely due to diarrhea causing decreased renal perfusion  s Cr much lower today  BERTIN resolving uneventfully  K is not low  Mild metabolic acidosis (non AG), c/w diarrhea  Hypotension as expected with fluid losses, BP improved today  Tolerating IVF's well  Ate breakfast without problems   Agree with current mgmt     Diarrhea: acute, appears resolving  Afebrile   WBC not high  Acute gastroenteritis  Likely viral  Sick contacts likely were 2 kids at home, grandchildren, 2 and 9 years old, both with diarrhea.  Per wife, the kids are doing well      Nephrolithiasis     Had a large kidney stone 1.2 cm in Dec 2017  No acute issues  To review:  Pt's father had also a kidney stone 2 weeks later, unusual!  No h/o of cystine stone (hereditary)  No family food fetishes identified that would predispose 2 family members to kidney stones  Kidney stones in general discussed with pt  The stone had been sent for analysis, could not find the results in epic.         Plans and recommendations:  As discussed above  OK to d/c home from our view

## 2019-01-27 NOTE — SUBJECTIVE & OBJECTIVE
Interval History: Pt was seen and examined. No new issues. Pt being discharged home today. Has no further diarrhea, no discomfort, feels ready to go home.    Review of patient's allergies indicates:   Allergen Reactions    Augmentin [amoxicillin-pot clavulanate] Rash    Pcn [penicillins] Rash     Current Facility-Administered Medications   Medication Frequency    0.9%  NaCl infusion Continuous    heparin (porcine) injection 5,000 Units Q8H    ondansetron injection 4 mg Q6H PRN    pantoprazole EC tablet 40 mg Daily    promethazine (PHENERGAN) 6.25 mg in dextrose 5 % 50 mL IVPB Q6H PRN    sodium chloride 0.9% flush 5 mL PRN    tamsulosin 24 hr capsule 0.4 mg Daily       Objective:     Vital Signs (Most Recent):  Temp: 97.8 °F (36.6 °C) (01/27/19 0711)  Pulse: 63 (01/27/19 0958)  Resp: 18 (01/27/19 0711)  BP: 116/65 (01/27/19 0711)  SpO2: 95 % (01/27/19 0711)  O2 Device (Oxygen Therapy): room air (01/27/19 0711) Vital Signs (24h Range):  Temp:  [97.5 °F (36.4 °C)-98.5 °F (36.9 °C)] 97.8 °F (36.6 °C)  Pulse:  [51-63] 63  Resp:  [17-18] 18  SpO2:  [94 %-97 %] 95 %  BP: (114-129)/(59-69) 116/65     Weight: 89.6 kg (197 lb 8.5 oz) (01/25/19 0852)  Body mass index is 26.79 kg/m².  Body surface area is 2.13 meters squared.    I/O last 3 completed shifts:  In: 3412.9 [P.O.:240; I.V.:3172.9]  Out: 111 [Urine:111]    Physical Exam   Constitutional: He is oriented to person, place, and time. He appears well-developed and well-nourished. No distress.   HENT:   Head: Normocephalic.   Cardiovascular: Normal rate, regular rhythm and normal heart sounds.   Pulmonary/Chest: Effort normal and breath sounds normal. No respiratory distress. He has no rales.   Abdominal: Soft. He exhibits no distension. There is no tenderness. There is no guarding.   Musculoskeletal: He exhibits no edema.   Neurological: He is alert and oriented to person, place, and time.   Skin: Skin is warm and dry.   Psychiatric: He has a normal mood and  affect. His behavior is normal. Thought content normal.   Nursing note and vitals reviewed.      Significant Labs: reviewed  BMP  Lab Results   Component Value Date     01/27/2019    K 4.3 01/27/2019     (H) 01/27/2019    CO2 23 01/27/2019    BUN 26 (H) 01/27/2019    CREATININE 1.0 01/27/2019    CALCIUM 8.4 (L) 01/27/2019    ANIONGAP 7 (L) 01/27/2019    ESTGFRAFRICA >60 01/27/2019    EGFRNONAA >60 01/27/2019

## 2019-02-01 NOTE — DISCHARGE SUMMARY
"[facility] Hospital Medicine  Discharge Summary      Patient Name: Delfino Monk  MRN: [id number]  Admission Date: 1/25/2019  Hospital Length of Stay: 2 days  Discharge Date and Time: 1/27/2019 11:28 AM  Attending Physician: No att. providers found   Discharging Provider: Johnathan Gomes MD  Primary Care Provider: Edgard Mendenhall MD      HPI:   Mr. Monk is a 64 yo male with a PMHx of HTN and BPH, who presented to the ED with c/o diarrhea x 5 days with ~5-10 watery episodes per day.  Associated generalized ABD pain, decreased UOP, and flatus.  Patient reports no diarrhea today, and has not voided in the past 2 days.  No aggravating or alleviating factors.  Denies any ABD distention, decreased appetite, N/V, constipation, melena, hematochezia, hematemesis, dysuria, hematuria, pelvic pain, flank pain, back pain, CP, SOB, lightheadedness, dizziness, syncope, AMS, fever, or chills.  No recent travel.  Patient's grandchildren recently had "stomach virus" with similar symptoms.  Upon arrival to ED, patient was notably hypotensive with BP 72/44.  He received IV fluid resuscitation with good BP response.  Initial work-up in ED resulted cr. 8.4, BUN 85, Na 131, K 3.6, WBC 11, TBili 0.9, normal lipase and LFTs.  CT of ABD/Pelvis showed dilated mid to distal small bowel with air-fluid levels possibly representing low-grade partial small bowel obstruction or enteritis.  Hospital Medicine was called for admission.  Case discussed with Nephrology who agree with current treatment.  Currently, patient appears comfortable in NAD.  Denies any diarrhea since arrival to ED.  BP remains stable.       * No surgery found *      Hospital Course:   62 y/o wm admitted with a dx of  BERTIN ,  Nausea and diarrhea . He was hypotensive which resolve with appropriate IVF.Nephrology was consulted . The CT abdomen show Dilated mid to distal small bowel with air-fluid levels possibly representing low-grade partial " small bowel obstruction or enteritis. The kidney function improved with previous tx . He still complaining of watery diarrhea which has  Improved since admission . He is tolerating liquid diet   1/27/19 Pt was seen and examined at bedside . He was determined to be suitable for d/c . The kidney function went back to baseline . He diarrhea improved . Pt  Tolerate full diet w/o any problem .      Consults:     Viral gastroenteritis    Improving        Essential hypertension    - Hypotensive in ED.  BP responded well to IVF's and remains stable.  - Hold all antihypertensives, monitor BP trends and resume as needed.     Acute gastroenteritis    - Likely viral.  - Continue supportive care.       Final Active Diagnoses:    Diagnosis Date Noted POA    Essential hypertension [I10] 01/26/2019 Yes     Chronic    Viral gastroenteritis [A08.4]  Yes    Acute gastroenteritis [K52.9]  Yes      Problems Resolved During this Admission:    Diagnosis Date Noted Date Resolved POA    PRINCIPAL PROBLEM:  BERTIN secondary to intravascular volume depletion [N17.9] 01/25/2019 01/27/2019 Yes    Diarrhea of presumed infectious origin [R19.7]  01/27/2019 Unknown    Enteritis [K52.9]  01/27/2019 Yes       Discharged Condition: stable    Disposition: Home or Self Care    Follow Up:  Follow-up Information     Edgard Mendenhall MD In 1 week.    Specialty:  Family Medicine  Contact information:  [address]  [facility] [id number] [phone]                 Patient Instructions:      Diet Cardiac     Notify your health care provider if you experience any of the following:  temperature >100.4     Notify your health care provider if you experience any of the following:  persistent nausea and vomiting or diarrhea     Notify your health care provider if you experience any of the following:  severe uncontrolled pain     Notify your health care provider if you experience any of the following:  redness, tenderness, or signs of infection (pain,  swelling, redness, odor or green/yellow discharge around incision site)     Notify your health care provider if you experience any of the following:  difficulty breathing or increased cough     Notify your health care provider if you experience any of the following:  severe persistent headache     Notify your health care provider if you experience any of the following:  worsening rash     Notify your health care provider if you experience any of the following:  persistent dizziness, light-headedness, or visual disturbances     Notify your health care provider if you experience any of the following:  increased confusion or weakness     Notify your health care provider if you experience any of the following:     Activity as tolerated       Significant Diagnostic Studies: Labs: BMP: No results for input(s): GLU, NA, K, CL, CO2, BUN, CREATININE, CALCIUM, MG in the last 48 hours., CMP No results for input(s): NA, K, CL, CO2, GLU, BUN, CREATININE, CALCIUM, PROT, ALBUMIN, BILITOT, ALKPHOS, AST, ALT, ANIONGAP, ESTGFRAFRICA, EGFRNONAA in the last 48 hours. and CBC No results for input(s): WBC, HGB, HCT, PLT in the last 48 hours.    Pending Diagnostic Studies:     None         Medications:  Reconciled Home Medications:      Medication List      START taking these medications    loperamide 2 mg capsule  Commonly known as:  IMODIUM  Take 1 capsule (2 mg total) by mouth 4 (four) times daily as needed for Diarrhea. Do not take more than 8 mg in 24 hr        CONTINUE taking these medications    aspirin 81 MG EC tablet  Commonly known as:  ECOTRIN  Take 81 mg by mouth once daily.     BENICAR 20 MG tablet  Generic drug:  olmesartan  Take 20 mg by mouth every evening.     finasteride 5 mg tablet  Commonly known as:  PROSCAR  Take 1 tablet (5 mg total) by mouth once daily.     tamsulosin 0.4 mg Cap  Commonly known as:  FLOMAX  Take 1 capsule (0.4 mg total) by mouth once daily.            Indwelling Lines/Drains at time of discharge:    Lines/Drains/Airways          None          Time spent on the discharge of patient: 35  minutes  Patient was seen and examined on the date of discharge and determined to be suitable for discharge.         Johnathan Gomes MD  Department of Hospital Medicine  [facility]

## 2019-02-04 ENCOUNTER — TELEPHONE (OUTPATIENT)
Dept: NEPHROLOGY | Facility: CLINIC | Age: 64
End: 2019-02-04

## 2019-02-04 NOTE — TELEPHONE ENCOUNTER
Called and spoke with patient to schedule hospital follow up. Patient states that he does not want to make a follow up right now. He will call the office back when he is ready to schedule.

## 2019-08-05 RX ORDER — FINASTERIDE 5 MG/1
5 TABLET, FILM COATED ORAL DAILY
Qty: 30 TABLET | Refills: 11 | Status: SHIPPED | OUTPATIENT
Start: 2019-08-05 | End: 2020-06-04 | Stop reason: SDUPTHER

## 2019-08-05 RX ORDER — TAMSULOSIN HYDROCHLORIDE 0.4 MG/1
0.4 CAPSULE ORAL DAILY
Qty: 30 CAPSULE | Refills: 11 | Status: SHIPPED | OUTPATIENT
Start: 2019-08-05 | End: 2020-06-04 | Stop reason: SDUPTHER

## 2019-08-11 ENCOUNTER — HOSPITAL ENCOUNTER (EMERGENCY)
Facility: HOSPITAL | Age: 64
Discharge: ANOTHER HOSPITAL | End: 2019-08-11
Attending: EMERGENCY MEDICINE
Payer: COMMERCIAL

## 2019-08-11 VITALS
WEIGHT: 200.06 LBS | DIASTOLIC BLOOD PRESSURE: 67 MMHG | TEMPERATURE: 98 F | HEIGHT: 72 IN | HEART RATE: 68 BPM | RESPIRATION RATE: 20 BRPM | OXYGEN SATURATION: 95 % | BODY MASS INDEX: 27.1 KG/M2 | SYSTOLIC BLOOD PRESSURE: 127 MMHG

## 2019-08-11 DIAGNOSIS — N39.0 URINARY TRACT INFECTION WITH HEMATURIA, SITE UNSPECIFIED: ICD-10-CM

## 2019-08-11 DIAGNOSIS — N23 RENAL COLIC ON LEFT SIDE: ICD-10-CM

## 2019-08-11 DIAGNOSIS — N20.0 KIDNEY STONE ON LEFT SIDE: Primary | ICD-10-CM

## 2019-08-11 DIAGNOSIS — R31.9 URINARY TRACT INFECTION WITH HEMATURIA, SITE UNSPECIFIED: ICD-10-CM

## 2019-08-11 LAB
ALBUMIN SERPL BCP-MCNC: 4.5 G/DL (ref 3.5–5.2)
ALP SERPL-CCNC: 57 U/L (ref 55–135)
ALT SERPL W/O P-5'-P-CCNC: 65 U/L (ref 10–44)
AMORPH CRY URNS QL MICRO: ABNORMAL
ANION GAP SERPL CALC-SCNC: 14 MMOL/L (ref 8–16)
AST SERPL-CCNC: 52 U/L (ref 10–40)
BACTERIA #/AREA URNS HPF: ABNORMAL /HPF
BACTERIA #/AREA URNS HPF: ABNORMAL /HPF
BASOPHILS # BLD AUTO: 0.03 K/UL (ref 0–0.2)
BASOPHILS NFR BLD: 0.3 % (ref 0–1.9)
BILIRUB SERPL-MCNC: 1.1 MG/DL (ref 0.1–1)
BILIRUB UR QL STRIP: NEGATIVE
BILIRUB UR QL STRIP: NEGATIVE
BUN SERPL-MCNC: 16 MG/DL (ref 8–23)
CALCIUM SERPL-MCNC: 9.6 MG/DL (ref 8.7–10.5)
CHLORIDE SERPL-SCNC: 107 MMOL/L (ref 95–110)
CLARITY UR: ABNORMAL
CLARITY UR: CLEAR
CO2 SERPL-SCNC: 20 MMOL/L (ref 23–29)
COLOR UR: YELLOW
COLOR UR: YELLOW
CREAT SERPL-MCNC: 1.1 MG/DL (ref 0.5–1.4)
DIFFERENTIAL METHOD: ABNORMAL
EOSINOPHIL # BLD AUTO: 0 K/UL (ref 0–0.5)
EOSINOPHIL NFR BLD: 0.3 % (ref 0–8)
ERYTHROCYTE [DISTWIDTH] IN BLOOD BY AUTOMATED COUNT: 12.7 % (ref 11.5–14.5)
EST. GFR  (AFRICAN AMERICAN): >60 ML/MIN/1.73 M^2
EST. GFR  (NON AFRICAN AMERICAN): >60 ML/MIN/1.73 M^2
GLUCOSE SERPL-MCNC: 177 MG/DL (ref 70–110)
GLUCOSE UR QL STRIP: NEGATIVE
GLUCOSE UR QL STRIP: NEGATIVE
HCT VFR BLD AUTO: 43 % (ref 40–54)
HGB BLD-MCNC: 15.3 G/DL (ref 14–18)
HGB UR QL STRIP: ABNORMAL
HGB UR QL STRIP: ABNORMAL
HYALINE CASTS #/AREA URNS LPF: 0 /LPF
HYALINE CASTS #/AREA URNS LPF: 0 /LPF
KETONES UR QL STRIP: NEGATIVE
KETONES UR QL STRIP: NEGATIVE
LEUKOCYTE ESTERASE UR QL STRIP: NEGATIVE
LEUKOCYTE ESTERASE UR QL STRIP: NEGATIVE
LYMPHOCYTES # BLD AUTO: 1.6 K/UL (ref 1–4.8)
LYMPHOCYTES NFR BLD: 14.1 % (ref 18–48)
MCH RBC QN AUTO: 32.6 PG (ref 27–31)
MCHC RBC AUTO-ENTMCNC: 35.6 G/DL (ref 32–36)
MCV RBC AUTO: 92 FL (ref 82–98)
MICROSCOPIC COMMENT: ABNORMAL
MICROSCOPIC COMMENT: ABNORMAL
MONOCYTES # BLD AUTO: 0.9 K/UL (ref 0.3–1)
MONOCYTES NFR BLD: 7.7 % (ref 4–15)
NEUTROPHILS # BLD AUTO: 9 K/UL (ref 1.8–7.7)
NEUTROPHILS NFR BLD: 77.9 % (ref 38–73)
NITRITE UR QL STRIP: NEGATIVE
NITRITE UR QL STRIP: NEGATIVE
PH UR STRIP: 5 [PH] (ref 5–8)
PH UR STRIP: 5 [PH] (ref 5–8)
PLATELET # BLD AUTO: 211 K/UL (ref 150–350)
PMV BLD AUTO: 10.4 FL (ref 9.2–12.9)
POTASSIUM SERPL-SCNC: 4.2 MMOL/L (ref 3.5–5.1)
PROT SERPL-MCNC: 7.8 G/DL (ref 6–8.4)
PROT UR QL STRIP: ABNORMAL
PROT UR QL STRIP: ABNORMAL
RBC # BLD AUTO: 4.7 M/UL (ref 4.6–6.2)
RBC #/AREA URNS HPF: 20 /HPF (ref 0–4)
RBC #/AREA URNS HPF: 5 /HPF (ref 0–4)
SODIUM SERPL-SCNC: 141 MMOL/L (ref 136–145)
SP GR UR STRIP: >=1.03 (ref 1–1.03)
SP GR UR STRIP: >=1.03 (ref 1–1.03)
SQUAMOUS #/AREA URNS HPF: 1 /HPF
URN SPEC COLLECT METH UR: ABNORMAL
URN SPEC COLLECT METH UR: ABNORMAL
UROBILINOGEN UR STRIP-ACNC: NEGATIVE EU/DL
UROBILINOGEN UR STRIP-ACNC: NEGATIVE EU/DL
WBC # BLD AUTO: 11.63 K/UL (ref 3.9–12.7)
WBC #/AREA URNS HPF: 1 /HPF (ref 0–5)
WBC #/AREA URNS HPF: 1 /HPF (ref 0–5)

## 2019-08-11 PROCEDURE — 96361 HYDRATE IV INFUSION ADD-ON: CPT

## 2019-08-11 PROCEDURE — 36415 COLL VENOUS BLD VENIPUNCTURE: CPT

## 2019-08-11 PROCEDURE — 85025 COMPLETE CBC W/AUTO DIFF WBC: CPT

## 2019-08-11 PROCEDURE — 96375 TX/PRO/DX INJ NEW DRUG ADDON: CPT

## 2019-08-11 PROCEDURE — 63600175 PHARM REV CODE 636 W HCPCS: Performed by: EMERGENCY MEDICINE

## 2019-08-11 PROCEDURE — 80053 COMPREHEN METABOLIC PANEL: CPT

## 2019-08-11 PROCEDURE — 81000 URINALYSIS NONAUTO W/SCOPE: CPT

## 2019-08-11 PROCEDURE — 81000 URINALYSIS NONAUTO W/SCOPE: CPT | Mod: 91

## 2019-08-11 PROCEDURE — 96374 THER/PROPH/DIAG INJ IV PUSH: CPT

## 2019-08-11 PROCEDURE — 99285 EMERGENCY DEPT VISIT HI MDM: CPT | Mod: 25

## 2019-08-11 RX ORDER — ONDANSETRON 2 MG/ML
4 INJECTION INTRAMUSCULAR; INTRAVENOUS
Status: COMPLETED | OUTPATIENT
Start: 2019-08-11 | End: 2019-08-11

## 2019-08-11 RX ORDER — MORPHINE SULFATE 4 MG/ML
2 INJECTION, SOLUTION INTRAMUSCULAR; INTRAVENOUS
Status: COMPLETED | OUTPATIENT
Start: 2019-08-11 | End: 2019-08-11

## 2019-08-11 RX ORDER — CIPROFLOXACIN 250 MG/1
250 TABLET, FILM COATED ORAL 2 TIMES DAILY
Status: ON HOLD | COMMUNITY
End: 2019-08-22 | Stop reason: HOSPADM

## 2019-08-11 RX ADMIN — ONDANSETRON 4 MG: 2 INJECTION INTRAMUSCULAR; INTRAVENOUS at 09:08

## 2019-08-11 RX ADMIN — MORPHINE SULFATE 2 MG: 4 INJECTION, SOLUTION INTRAMUSCULAR; INTRAVENOUS at 09:08

## 2019-08-11 RX ADMIN — SODIUM CHLORIDE 1000 ML: 0.9 INJECTION, SOLUTION INTRAVENOUS at 08:08

## 2019-08-12 ENCOUNTER — TELEPHONE (OUTPATIENT)
Dept: UROLOGY | Facility: CLINIC | Age: 64
End: 2019-08-12

## 2019-08-12 NOTE — ED PROVIDER NOTES
SCRIBE #1 NOTE: I, Vin Levy, am scribing for, and in the presence of, Brent Haley Jr., MD. I have scribed the entire note.       History     Chief Complaint   Patient presents with    Flank Pain     C/o of left flank pain starting today. Recent hesitancy and difficulty [facility]. Pt reports that pain is relieved at this time after urinating in lobby bathroom.      Review of patient's allergies indicates:   Allergen Reactions    Augmentin [amoxicillin-pot clavulanate] Rash    Pcn [penicillins] Rash         History of Present Illness     HPI    8/11/2019, 8:11 PM  History obtained from the wife and patient      History of Present Illness: Delfino Monk is a 64 y.o. male patient with a PMHx of kidney stones who presents to the Emergency Department for evaluation of flank pain which onset gradually earlier today PTA. Pt states he experienced increased urination frequency Monday (8/5). Pt tried to contact Dr. Green on Friday (8/9), however, his office was closed. [facility]9 and was told he had a small amount of blood in his urine. Pt was discharged from there with abx. When playing golf today, pt reports new onset left sided flank pain. After arriving in the ED, pt urinated in the lobby bathroom and states all sxs resolved other than urge to urinate. Symptoms are intermittent and moderate in severity. No mitigating or exacerbating factors reported. Associated sxs include increased urination frequency, hematuria, trouble urinating. Patient denies any CP, abd pain, n/v/d, and all other sxs at this time. No other prior Tx reported. No further complaints or concerns at this time.         Arrival mode: Personal vehicle    PCP: Edgard Mendenhall MD        Past Medical History:  Past Medical History:   Diagnosis Date    BPH (benign prostatic hyperplasia)     HTN (hypertension)        Past Surgical History:  Past  Surgical History:   Procedure Laterality Date    bilateral lap inguinal hernia repair      CHOLECYSTECTOMY  2016    CYSTOSCOPY WITH STENT PLACEMENT Right 12/12/2017    [facility]    EXTRACTION-STONE-URETEROSCOPY Right 12/12/2017    [facility]    HERNIA REPAIR      left ankle      REPAIR-HERNIA-INGUINAL RECURRENT Left 9/4/2015    [facility] Luciano Murphy [facility]    TONSILLECTOMY, ADENOIDECTOMY           Family History:  No family history on file.    Social History:  Social History     Tobacco Use    Smoking status: Never Smoker    Smokeless tobacco: Never Used   Substance and Sexual Activity    Alcohol use: No     Alcohol/week: 0.0 oz    Drug use: Not on file    Sexual activity: Not on file        Review of Systems     Review of Systems   Constitutional: Negative for fever.   HENT: Negative for sore throat.    Respiratory: Negative for shortness of breath.    Cardiovascular: Negative for chest pain.   Gastrointestinal: Negative for abdominal pain, diarrhea, nausea and vomiting.   Genitourinary: Positive for difficulty urinating, flank pain (left), frequency (increased) and hematuria. Negative for dysuria.   Musculoskeletal: Negative for back pain.   Skin: Negative for rash.   Neurological: Negative for weakness.   Hematological: Does not bruise/bleed easily.   All other systems reviewed and are negative.       Physical Exam     Initial Vitals [08/11/19 1932]   BP Pulse Resp Temp SpO2   (!) 146/84 79 20 97.7 °F (36.5 °C) 96 %      MAP       --          Physical Exam  Nursing Notes and Vital Signs Reviewed.  Constitutional: Patient is in no acute distress. Well-developed and well-nourished.  Head: Atraumatic. Normocephalic.  Eyes: PERRL. EOM intact. Conjunctivae are not pale. No scleral icterus.  ENT: Mucous membranes are moist. Oropharynx is clear and symmetric.    Neck: Supple. Full ROM. No  lymphadenopathy.  Cardiovascular: Regular rate. Regular rhythm. No murmurs, rubs, or gallops. Distal pulses are 2+ and symmetric.  Pulmonary/Chest: No respiratory distress. Clear to auscultation bilaterally. No wheezing or rales.  Abdominal: Soft and non-distended.  There is no tenderness.  No rebound, guarding, or rigidity. Good bowel sounds.  Genitourinary: No CVA tenderness  Musculoskeletal: Moves all extremities. No obvious deformities. No edema. No calf tenderness.  Skin: Warm and dry.  Neurological:  Alert, awake, and appropriate.  Normal speech.  No acute focal neurological deficits are appreciated.  Psychiatric: Normal affect. Good eye contact. Appropriate in content.     ED Course   Procedures  ED Vital Signs:  Vitals:    08/11/19 1932 08/11/19 2104 08/11/19 2202   BP: (!) 146/84 (!) 191/97 (!) 113/59   Pulse: 79 77 74   Resp: 20 18 18   Temp: 97.7 °F (36.5 °C)     TempSrc: Oral     SpO2: 96% 97% 97%   Weight: 90.8 kg (200 lb 1.1 oz)     Height: 6' (1.829 m)         Abnormal Lab Results:  Labs Reviewed   COMPREHENSIVE METABOLIC PANEL - Abnormal; Notable for the following components:       Result Value    CO2 20 (*)     Glucose 177 (*)     Total Bilirubin 1.1 (*)     AST 52 (*)     ALT 65 (*)     All other components within normal limits   URINALYSIS, REFLEX TO URINE CULTURE - Abnormal; Notable for the following components:    Specific Gravity, UA >=1.030 (*)     Protein, UA 1+ (*)     Occult Blood UA 3+ (*)     All other components within normal limits    Narrative:     Preferred Collection Type->Urine, Clean Catch   URINALYSIS MICROSCOPIC - Abnormal; Notable for the following components:    RBC, UA 20 (*)     All other components within normal limits    Narrative:     Preferred Collection Type->Urine, Clean Catch   URINALYSIS, REFLEX TO URINE CULTURE - Abnormal; Notable for the following components:    Appearance, UA Cloudy (*)     Specific Gravity, UA >=1.030 (*)     Protein, UA 1+ (*)     Occult Blood UA  3+ (*)     All other components within normal limits    Narrative:     Preferred Collection Type->Urine, Clean Catch   CBC W/ AUTO DIFFERENTIAL - Abnormal; Notable for the following components:    Mean Corpuscular Hemoglobin 32.6 (*)     Gran # (ANC) 9.0 (*)     Gran% 77.9 (*)     Lymph% 14.1 (*)     All other components within normal limits   URINALYSIS MICROSCOPIC - Abnormal; Notable for the following components:    RBC, UA 5 (*)     All other components within normal limits    Narrative:     Preferred Collection Type->Urine, Clean Catch        All Lab Results:  Results for orders placed or performed during the hospital encounter of 08/11/19   Comprehensive metabolic panel   Result Value Ref Range    Sodium 141 136 - 145 mmol/L    Potassium 4.2 3.5 - 5.1 mmol/L    Chloride 107 95 - 110 mmol/L    CO2 20 (L) 23 - 29 mmol/L    Glucose 177 (H) 70 - 110 mg/dL    BUN, Bld 16 8 - 23 mg/dL    Creatinine 1.1 0.5 - 1.4 mg/dL    Calcium 9.6 8.7 - 10.5 mg/dL    Total Protein 7.8 6.0 - 8.4 g/dL    Albumin 4.5 3.5 - 5.2 g/dL    Total Bilirubin 1.1 (H) 0.1 - 1.0 mg/dL    Alkaline Phosphatase 57 55 - 135 U/L    AST 52 (H) 10 - 40 U/L    ALT 65 (H) 10 - 44 U/L    Anion Gap 14 8 - 16 mmol/L    eGFR if African American >60 >60 mL/min/1.73 m^2    eGFR if non African American >60 >60 mL/min/1.73 m^2   Urinalysis, Reflex to Urine Culture Urine, Clean Catch   Result Value Ref Range    Specimen UA Urine, Clean Catch     Color, UA Yellow Yellow, Straw, Kyra    Appearance, UA Clear Clear    pH, UA 5.0 5.0 - 8.0    Specific Gravity, UA >=1.030 (A) 1.005 - 1.030    Protein, UA 1+ (A) Negative    Glucose, UA Negative Negative    Ketones, UA Negative Negative    Bilirubin (UA) Negative Negative    Occult Blood UA 3+ (A) Negative    Nitrite, UA Negative Negative    Urobilinogen, UA Negative <2.0 EU/dL    Leukocytes, UA Negative Negative   Urinalysis Microscopic   Result Value Ref Range    RBC, UA 20 (H) 0 - 4 /hpf    WBC, UA 1 0 - 5 /hpf     Bacteria Occasional None-Occ /hpf    Squam Epithel, UA 1 /hpf    Hyaline Casts, UA 0 0-1/lpf /lpf    Microscopic Comment SEE COMMENT    Urinalysis, Reflex to Urine Culture Urine, Clean Catch   Result Value Ref Range    Specimen UA Urine, Clean Catch     Color, UA Yellow Yellow, Straw, Kyra    Appearance, UA Cloudy (A) Clear    pH, UA 5.0 5.0 - 8.0    Specific Gravity, UA >=1.030 (A) 1.005 - 1.030    Protein, UA 1+ (A) Negative    Glucose, UA Negative Negative    Ketones, UA Negative Negative    Bilirubin (UA) Negative Negative    Occult Blood UA 3+ (A) Negative    Nitrite, UA Negative Negative    Urobilinogen, UA Negative <2.0 EU/dL    Leukocytes, UA Negative Negative   CBC auto differential   Result Value Ref Range    WBC 11.63 3.90 - 12.70 K/uL    RBC 4.70 4.60 - 6.20 M/uL    Hemoglobin 15.3 14.0 - 18.0 g/dL    Hematocrit 43.0 40.0 - 54.0 %    Mean Corpuscular Volume 92 82 - 98 fL    Mean Corpuscular Hemoglobin 32.6 (H) 27.0 - 31.0 pg    Mean Corpuscular Hemoglobin Conc 35.6 32.0 - 36.0 g/dL    RDW 12.7 11.5 - 14.5 %    Platelets 211 150 - 350 K/uL    MPV 10.4 9.2 - 12.9 fL    Gran # (ANC) 9.0 (H) 1.8 - 7.7 K/uL    Lymph # 1.6 1.0 - 4.8 K/uL    Mono # 0.9 0.3 - 1.0 K/uL    Eos # 0.0 0.0 - 0.5 K/uL    Baso # 0.03 0.00 - 0.20 K/uL    Gran% 77.9 (H) 38.0 - 73.0 %    Lymph% 14.1 (L) 18.0 - 48.0 %    Mono% 7.7 4.0 - 15.0 %    Eosinophil% 0.3 0.0 - 8.0 %    Basophil% 0.3 0.0 - 1.9 %    Differential Method Automated    Urinalysis Microscopic   Result Value Ref Range    RBC, UA 5 (H) 0 - 4 /hpf    WBC, UA 1 0 - 5 /hpf    Bacteria Occasional None-Occ /hpf    Hyaline Casts, UA 0 0-1/lpf /lpf    Amorphous, UA Moderate None-Moderate    Microscopic Comment SEE COMMENT          Imaging Results:  Imaging Results          CT Renal Stone Study ABD Pelvis WO (Final result)  Result time 08/11/19 21:08:17    Final result by Nghia Murillo MD (08/11/19 21:08:17)                 Impression:      10 x 5 mm stone within the distal  left ureter at the left UVJ producing mild left-sided hydronephrosis and hydroureter.    Prostate is enlarged measuring 6.3 x 6 cm.  Correlate with PSA.    Diverticulosis without evidence of acute diverticulitis.    Moderate size left inguinal hernia containing a portion of proximal sigmoid colon. No evidence of obstruction.    Hepatic steatosis.    All CT scans at this facility use dose modulation, iterative reconstruction and/or weight based dosing when appropriate to reduce radiation dose to as low as reasonably achievable.      Electronically signed by: Nghia Murillo MD  Date:    08/11/2019  Time:    21:08             Narrative:    EXAMINATION:  CT RENAL STONE STUDY ABD PELVIS WO    CLINICAL HISTORY:  Flank pain, stone disease suspected;    TECHNIQUE:  Routine 5 mm non-contrast images of the abdomen and pelvis were done.  Sagittal and coronal reformats were also submitted for interpretation.    COMPARISON:  01/25/2019    FINDINGS:  The lung bases are unremarkable.  There is no pleural fluid present.  The visualized portions of the heart appear normal.    The liver is normal in size with decreased attenuation consistent with hepatic steatosis.  No focal hepatic abnormality.  Gallbladder is surgically absent.  There is no intra-or extrahepatic biliary ductal dilatation.    The spleen, pancreas, and adrenal glands are unremarkable.    The kidneys are normal in size and location.  10 x 5 mm stone within the distal left ureter at the left UVJ producing mild left-sided hydronephrosis and hydroureter.  Prostate is enlarged measuring 6.3 x 6 cm.  Bladder is collapsed which limits evaluation.    Stomach is unremarkable.   The visualized loops of small bowel show no evidence of obstruction or inflammation. Large bowel demonstrates mild constipation.  No evidence of appendicitis.  There is no ascites, free fluid, or intraperitoneal free air.    Moderate size left inguinal hernia containing a portion of proximal sigmoid  colon.  No evidence of obstruction.  Scattered diverticulosis within the colon without evidence of acute diverticulitis.    There is no evidence of lymph node enlargement in the abdomen or pelvis.    The abdominal aorta is normal in course and caliber without significant atherosclerotic calcifications.    When viewed with bone windows the osseous structures are unremarkable.    The extraperitoneal soft tissues are unremarkable.                                          The Emergency Provider reviewed the vital signs and test results, which are outlined above.     ED Discussion     9:00 PM: Re-evaluated pt. Pt states he is now in pain again. CT ordered.    9:20 PM: Discussed pt's case with Dr. Green (Urology).- no urolgoy emeegrny coverage here at [facility] this evening     9:25 PM: Re-evaluated pt. Pt is in moderate discomfort with left sided flank pain.  D/w pt all pertinent results. D/w pt any concerns expressed at this time. Answered all questions. Pt expresses understanding at this time.    10:12 PM: Consult with Dr. Hicks at [facility] concerning pt. There are no services, which the patient requires, offered at [facility] at this time. Dr. Hicks expresses understanding and will accept transfer for [facility].  Accepting Facility: [facility]  Accepting Physician: Dr. Hicks    10:20 PM: Re-evaluated pt. Informed pt and family that there are no  services available at this time. I have discussed test results, shared treatment plan, and the need for transfer with patient and family at bedside. All historical, clinical, radiographic, and laboratory findings were reviewed with the patient/family in detail. [facility]. Patient understands that there could be unforeseen motor vehicle accidents or loss of vital signs that could result in potential death or permanent disability. Pt and family express understanding at this time and agree  with all information. All questions answered. Pt and family have no further questions or concerns at this time. Pt is ready for transfer.           ED Medication(s):  Medications   sodium chloride 0.9% bolus 1,000 mL (1,000 mLs Intravenous New Bag 8/11/19 2022)   morphine injection 2 mg (2 mg Intravenous Given 8/11/19 2102)   ondansetron injection 4 mg (4 mg Intravenous Given 8/11/19 2102)       New Prescriptions    No medications on file                 Medical Decision Making:   Clinical Tests:   Lab Tests: Ordered and Reviewed  Radiological Study: Ordered and Reviewed             Scribe Attestation:   Scribe #1: I performed the above scribed service and the documentation accurately describes the services I performed. I attest to the accuracy of the note.     Attending:   Physician Attestation Statement for Scribe #1: I, Brent Haley Jr., MD, personally performed the services described in this documentation, as scribed by Vin Levy, in my presence, and it is both accurate and complete.           Clinical Impression       ICD-10-CM ICD-9-CM   1. Kidney stone on left side N20.0 592.0   2. Urinary tract infection with hematuria, site unspecified N39.0 599.0    R31.9 599.70   3. Renal colic on left side N23 788.0         Disposition:   Disposition: Transferred  Condition: Fair         Brent Haley Jr., MD  08/12/19 0058

## 2019-08-12 NOTE — TELEPHONE ENCOUNTER
Patient was contacted to schedule appt as requested per Dr. Green; pt states he had lithotripsy done already and will be coming in to see us on 8/19/19.  MD informed.

## 2019-08-15 ENCOUNTER — HOSPITAL ENCOUNTER (EMERGENCY)
Facility: HOSPITAL | Age: 64
Discharge: HOME | End: 2019-08-15
Attending: EMERGENCY MEDICINE
Payer: COMMERCIAL

## 2019-08-15 ENCOUNTER — HOSPITAL ENCOUNTER (OUTPATIENT)
Dept: RADIOLOGY | Facility: HOSPITAL | Age: 64
Discharge: HOME | End: 2019-08-15
Attending: UROLOGY
Payer: COMMERCIAL

## 2019-08-15 ENCOUNTER — TELEPHONE (OUTPATIENT)
Dept: UROLOGY | Facility: CLINIC | Age: 64
End: 2019-08-15

## 2019-08-15 ENCOUNTER — OFFICE VISIT (OUTPATIENT)
Dept: UROLOGY | Facility: CLINIC | Age: 64
End: 2019-08-15
Payer: COMMERCIAL

## 2019-08-15 VITALS
WEIGHT: 199.06 LBS | HEIGHT: 72 IN | BODY MASS INDEX: 26.96 KG/M2 | DIASTOLIC BLOOD PRESSURE: 58 MMHG | SYSTOLIC BLOOD PRESSURE: 112 MMHG | HEART RATE: 96 BPM

## 2019-08-15 VITALS
DIASTOLIC BLOOD PRESSURE: 74 MMHG | WEIGHT: 199.06 LBS | HEIGHT: 72 IN | TEMPERATURE: 98 F | SYSTOLIC BLOOD PRESSURE: 148 MMHG | BODY MASS INDEX: 26.96 KG/M2 | OXYGEN SATURATION: 98 % | HEART RATE: 74 BPM | RESPIRATION RATE: 18 BRPM

## 2019-08-15 DIAGNOSIS — F41.9 ANXIETY: ICD-10-CM

## 2019-08-15 DIAGNOSIS — N20.1 URETERAL STONE: Primary | ICD-10-CM

## 2019-08-15 DIAGNOSIS — N20.1 URETERAL STONE: ICD-10-CM

## 2019-08-15 DIAGNOSIS — R31.9 HEMATURIA, UNSPECIFIED TYPE: Primary | ICD-10-CM

## 2019-08-15 LAB
ALBUMIN SERPL BCP-MCNC: 4.3 G/DL (ref 3.5–5.2)
ALP SERPL-CCNC: 56 U/L (ref 55–135)
ALT SERPL W/O P-5'-P-CCNC: 70 U/L (ref 10–44)
ANION GAP SERPL CALC-SCNC: 14 MMOL/L (ref 8–16)
AST SERPL-CCNC: 52 U/L (ref 10–40)
BACTERIA #/AREA URNS HPF: ABNORMAL /HPF
BASOPHILS # BLD AUTO: 0.04 K/UL (ref 0–0.2)
BASOPHILS NFR BLD: 0.4 % (ref 0–1.9)
BILIRUB SERPL-MCNC: 1.1 MG/DL (ref 0.1–1)
BILIRUB UR QL STRIP: ABNORMAL
BUN SERPL-MCNC: 14 MG/DL (ref 8–23)
CALCIUM SERPL-MCNC: 9.7 MG/DL (ref 8.7–10.5)
CHLORIDE SERPL-SCNC: 105 MMOL/L (ref 95–110)
CLARITY UR: CLEAR
CO2 SERPL-SCNC: 23 MMOL/L (ref 23–29)
COLOR UR: YELLOW
CREAT SERPL-MCNC: 1 MG/DL (ref 0.5–1.4)
DIFFERENTIAL METHOD: ABNORMAL
EOSINOPHIL # BLD AUTO: 0.3 K/UL (ref 0–0.5)
EOSINOPHIL NFR BLD: 2.9 % (ref 0–8)
ERYTHROCYTE [DISTWIDTH] IN BLOOD BY AUTOMATED COUNT: 12.7 % (ref 11.5–14.5)
EST. GFR  (AFRICAN AMERICAN): >60 ML/MIN/1.73 M^2
EST. GFR  (NON AFRICAN AMERICAN): >60 ML/MIN/1.73 M^2
GLUCOSE SERPL-MCNC: 159 MG/DL (ref 70–110)
GLUCOSE UR QL STRIP: NEGATIVE
HCT VFR BLD AUTO: 44 % (ref 40–54)
HCV AB SERPL QL IA: NEGATIVE
HGB BLD-MCNC: 15.7 G/DL (ref 14–18)
HGB UR QL STRIP: ABNORMAL
HYALINE CASTS #/AREA URNS LPF: 0 /LPF
KETONES UR QL STRIP: NEGATIVE
LEUKOCYTE ESTERASE UR QL STRIP: NEGATIVE
LYMPHOCYTES # BLD AUTO: 2.4 K/UL (ref 1–4.8)
LYMPHOCYTES NFR BLD: 26.8 % (ref 18–48)
MCH RBC QN AUTO: 32.7 PG (ref 27–31)
MCHC RBC AUTO-ENTMCNC: 35.7 G/DL (ref 32–36)
MCV RBC AUTO: 92 FL (ref 82–98)
MICROSCOPIC COMMENT: ABNORMAL
MONOCYTES # BLD AUTO: 1.1 K/UL (ref 0.3–1)
MONOCYTES NFR BLD: 12.2 % (ref 4–15)
NEUTROPHILS # BLD AUTO: 5.3 K/UL (ref 1.8–7.7)
NEUTROPHILS NFR BLD: 58 % (ref 38–73)
NITRITE UR QL STRIP: NEGATIVE
PH UR STRIP: 5 [PH] (ref 5–8)
PLATELET # BLD AUTO: 194 K/UL (ref 150–350)
PMV BLD AUTO: 9.8 FL (ref 9.2–12.9)
POTASSIUM SERPL-SCNC: 3.9 MMOL/L (ref 3.5–5.1)
PROT SERPL-MCNC: 7.9 G/DL (ref 6–8.4)
PROT UR QL STRIP: ABNORMAL
RBC # BLD AUTO: 4.8 M/UL (ref 4.6–6.2)
RBC #/AREA URNS HPF: 50 /HPF (ref 0–4)
SODIUM SERPL-SCNC: 142 MMOL/L (ref 136–145)
SP GR UR STRIP: >=1.03 (ref 1–1.03)
URN SPEC COLLECT METH UR: ABNORMAL
UROBILINOGEN UR STRIP-ACNC: NEGATIVE EU/DL
WBC # BLD AUTO: 9.11 K/UL (ref 3.9–12.7)
WBC #/AREA URNS HPF: 1 /HPF (ref 0–5)
YEAST URNS QL MICRO: ABNORMAL

## 2019-08-15 PROCEDURE — 99999 PR PBB SHADOW E&M-EST. PATIENT-LVL IV: CPT | Mod: PBBFAC,,, | Performed by: UROLOGY

## 2019-08-15 PROCEDURE — 74018 RADEX ABDOMEN 1 VIEW: CPT | Mod: TC

## 2019-08-15 PROCEDURE — 71046 X-RAY EXAM CHEST 2 VIEWS: CPT | Mod: 26,,, | Performed by: RADIOLOGY

## 2019-08-15 PROCEDURE — 81000 URINALYSIS NONAUTO W/SCOPE: CPT

## 2019-08-15 PROCEDURE — 74018 XR ABDOMEN AP 1 VIEW: ICD-10-PCS | Mod: 26,,, | Performed by: RADIOLOGY

## 2019-08-15 PROCEDURE — 99283 EMERGENCY DEPT VISIT LOW MDM: CPT | Mod: 25

## 2019-08-15 PROCEDURE — 71046 X-RAY EXAM CHEST 2 VIEWS: CPT | Mod: TC

## 2019-08-15 PROCEDURE — 3078F PR MOST RECENT DIASTOLIC BLOOD PRESSURE < 80 MM HG: ICD-10-PCS | Mod: CPTII,S$GLB,, | Performed by: UROLOGY

## 2019-08-15 PROCEDURE — 3074F SYST BP LT 130 MM HG: CPT | Mod: CPTII,S$GLB,, | Performed by: UROLOGY

## 2019-08-15 PROCEDURE — 86803 HEPATITIS C AB TEST: CPT

## 2019-08-15 PROCEDURE — 74018 RADEX ABDOMEN 1 VIEW: CPT | Mod: 26,,, | Performed by: RADIOLOGY

## 2019-08-15 PROCEDURE — 3008F BODY MASS INDEX DOCD: CPT | Mod: CPTII,S$GLB,, | Performed by: UROLOGY

## 2019-08-15 PROCEDURE — 99214 PR OFFICE/OUTPT VISIT, EST, LEVL IV, 30-39 MIN: ICD-10-PCS | Mod: 57,S$GLB,, | Performed by: UROLOGY

## 2019-08-15 PROCEDURE — 3074F PR MOST RECENT SYSTOLIC BLOOD PRESSURE < 130 MM HG: ICD-10-PCS | Mod: CPTII,S$GLB,, | Performed by: UROLOGY

## 2019-08-15 PROCEDURE — 80053 COMPREHEN METABOLIC PANEL: CPT

## 2019-08-15 PROCEDURE — 99999 PR PBB SHADOW E&M-EST. PATIENT-LVL IV: ICD-10-PCS | Mod: PBBFAC,,, | Performed by: UROLOGY

## 2019-08-15 PROCEDURE — 71046 XR CHEST PA AND LATERAL: ICD-10-PCS | Mod: 26,,, | Performed by: RADIOLOGY

## 2019-08-15 PROCEDURE — 85025 COMPLETE CBC W/AUTO DIFF WBC: CPT

## 2019-08-15 PROCEDURE — 3008F PR BODY MASS INDEX (BMI) DOCUMENTED: ICD-10-PCS | Mod: CPTII,S$GLB,, | Performed by: UROLOGY

## 2019-08-15 PROCEDURE — 99214 OFFICE O/P EST MOD 30 MIN: CPT | Mod: 57,S$GLB,, | Performed by: UROLOGY

## 2019-08-15 PROCEDURE — 1101F PR PT FALLS ASSESS DOC 0-1 FALLS W/OUT INJ PAST YR: ICD-10-PCS | Mod: CPTII,S$GLB,, | Performed by: UROLOGY

## 2019-08-15 PROCEDURE — 1101F PT FALLS ASSESS-DOCD LE1/YR: CPT | Mod: CPTII,S$GLB,, | Performed by: UROLOGY

## 2019-08-15 PROCEDURE — 36415 COLL VENOUS BLD VENIPUNCTURE: CPT

## 2019-08-15 PROCEDURE — 3078F DIAST BP <80 MM HG: CPT | Mod: CPTII,S$GLB,, | Performed by: UROLOGY

## 2019-08-15 RX ORDER — OXYBUTYNIN CHLORIDE 5 MG/1
5 TABLET ORAL 3 TIMES DAILY
Qty: 90 TABLET | Refills: 0 | Status: ON HOLD | OUTPATIENT
Start: 2019-08-15 | End: 2019-09-24 | Stop reason: HOSPADM

## 2019-08-15 RX ORDER — DIAZEPAM 5 MG/1
5 TABLET ORAL EVERY 8 HOURS PRN
Qty: 10 TABLET | Refills: 0 | Status: ON HOLD | OUTPATIENT
Start: 2019-08-15 | End: 2019-09-24 | Stop reason: HOSPADM

## 2019-08-15 NOTE — ED PROVIDER NOTES
SCRIBE #1 NOTE: IFlorian, am scribing for, and in the presence of, Anthony Galindo MD. I have scribed the entire note.       History     Chief Complaint   Patient presents with    Urinary Retention     pt reports lithotripsy Monday at [facility] and unable to urinate since around 1600 last night     Review of patient's allergies indicates:   Allergen Reactions    Augmentin [amoxicillin-pot clavulanate] Rash    Pcn [penicillins] Rash         History of Present Illness     HPI    8/15/2019, 1:13 AM   History obtained from the patient      History of Present Illness: Delfino Monk is a 64 y.o. male patient with a PMHx of HTN who presents to the Emergency Department for evaluation of urinary retention which onset gradually last night. Pt reports having a lithotripsy Monday at [facility]. Pt notes he has drank about 2 liters of fluid today. Symptoms are constant and moderate in severity. No mitigating or exacerbating factors reported. No associated sxs reported. Patient denies any fever, CP, SOB, abd pain, N/V, back pain, HA, weakness, and all other sxs at this time. No prior Tx reported. No further complaints or concerns at this time.        Arrival mode: Personal vehicle    PCP: Edgard Mendenhall MD        Past Medical History:  Past Medical History:   Diagnosis Date    BPH (benign prostatic hyperplasia)     HTN (hypertension)        Past Surgical History:  Past Surgical History:   Procedure Laterality Date    bilateral lap inguinal hernia repair      CHOLECYSTECTOMY  2016    CYSTOSCOPY WITH STENT PLACEMENT Right 12/12/2017    Performed by Bradly Green IV, [facility] at [facility] OR    EXTRACTION-STONE-URETEROSCOPY Right 12/12/2017    Performed by Bradly Green IV, MD at [facility] OR    HERNIA REPAIR      left ankle      REPAIR-HERNIA-INGUINAL RECURRENT Left 9/4/2015    Performed by Luciano Murphy MD at [facility] OR    TONSILLECTOMY, ADENOIDECTOMY           Family History:  History reviewed. No pertinent family  history.      Social History:  Social History     Tobacco Use    Smoking status: Never Smoker    Smokeless tobacco: Never Used   Substance and Sexual Activity    Alcohol use: No     Alcohol/week: 0.0 oz    Drug use: Not on file    Sexual activity: Unknown        Review of Systems     Review of Systems   Constitutional: Negative for fever.   HENT: Negative for sore throat.    Respiratory: Negative for shortness of breath.    Cardiovascular: Negative for chest pain.   Gastrointestinal: Negative for abdominal pain, nausea and vomiting.   Genitourinary: Positive for decreased urine volume. Negative for dysuria.   Musculoskeletal: Negative for back pain.   Skin: Negative for rash.   Neurological: Negative for weakness and headaches.   Hematological: Does not bruise/bleed easily.   All other systems reviewed and are negative.       Physical Exam     Initial Vitals [08/15/19 0051]   BP Pulse Resp Temp SpO2   (!) 153/83 62 16 97.7 °F (36.5 °C) 97 %      MAP       --          Physical Exam  Nursing Notes and Vital Signs Reviewed.  Constitutional: Patient is in no acute distress. Well-developed and well-nourished.  Head: Atraumatic. Normocephalic.  Eyes: PERRL. EOM intact. Conjunctivae are not pale. No scleral icterus.  ENT: Mucous membranes are moist. Oropharynx is clear and symmetric.    Neck: Supple. Full ROM. No lymphadenopathy.  Cardiovascular: Regular rate. Regular rhythm. No murmurs, rubs, or gallops. Distal pulses are 2+ and symmetric.  Pulmonary/Chest: No respiratory distress. Clear to auscultation bilaterally. No wheezing or rales.  Abdominal: Soft and non-distended.  There is no tenderness.  No rebound, guarding, or rigidity.   Musculoskeletal: Moves all extremities. No obvious deformities. No edema. No calf tenderness.  Skin: Warm and dry.  Neurological:  Alert, awake, and appropriate.  Normal speech.  No acute focal neurological deficits are appreciated.  Psychiatric: Normal affect. Good eye contact.  Appropriate in content.     ED Course   Procedures  ED Vital Signs:  Vitals:    08/15/19 0051 08/15/19 0245   BP: (!) 153/83 (!) 148/74   Pulse: 62 74   Resp: 16 18   Temp: 97.7 °F (36.5 °C)    TempSrc: Oral    SpO2: 97% 98%   Weight: 90.3 kg (199 lb 1.2 oz)    Height: 6' (1.829 m)        Abnormal Lab Results:  Labs Reviewed   URINALYSIS, REFLEX TO URINE CULTURE - Abnormal; Notable for the following components:       Result Value    Specific Gravity, UA >=1.030 (*)     Protein, UA 1+ (*)     Bilirubin (UA) 1+ (*)     Occult Blood UA 3+ (*)     All other components within normal limits    Narrative:     Preferred Collection Type->Urine, Clean Catch   CBC W/ AUTO DIFFERENTIAL - Abnormal; Notable for the following components:    Mean Corpuscular Hemoglobin 32.7 (*)     Mono # 1.1 (*)     All other components within normal limits   COMPREHENSIVE METABOLIC PANEL - Abnormal; Notable for the following components:    Glucose 159 (*)     Total Bilirubin 1.1 (*)     AST 52 (*)     ALT 70 (*)     All other components within normal limits   URINALYSIS MICROSCOPIC - Abnormal; Notable for the following components:    RBC, UA 50 (*)     Bacteria Few (*)     Yeast, UA Occasional (*)     All other components within normal limits    Narrative:     Preferred Collection Type->Urine, Clean Catch   HEPATITIS C ANTIBODY        All Lab Results:  Results for orders placed or performed during the hospital encounter of 08/15/19   Hepatitis C antibody   Result Value Ref Range    Hepatitis C Ab Negative    Urinalysis, Reflex to Urine Culture Urine, Clean Catch   Result Value Ref Range    Specimen UA Urine, Clean Catch     Color, UA Yellow Yellow, Straw, Kyra    Appearance, UA Clear Clear    pH, UA 5.0 5.0 - 8.0    Specific Gravity, UA >=1.030 (A) 1.005 - 1.030    Protein, UA 1+ (A) Negative    Glucose, UA Negative Negative    Ketones, UA Negative Negative    Bilirubin (UA) 1+ (A) Negative    Occult Blood UA 3+ (A) Negative    Nitrite, UA  Negative Negative    Urobilinogen, UA Negative <2.0 EU/dL    Leukocytes, UA Negative Negative   CBC auto differential   Result Value Ref Range    WBC 9.11 3.90 - 12.70 K/uL    RBC 4.80 4.60 - 6.20 M/uL    Hemoglobin 15.7 14.0 - 18.0 g/dL    Hematocrit 44.0 40.0 - 54.0 %    Mean Corpuscular Volume 92 82 - 98 fL    Mean Corpuscular Hemoglobin 32.7 (H) 27.0 - 31.0 pg    Mean Corpuscular Hemoglobin Conc 35.7 32.0 - 36.0 g/dL    RDW 12.7 11.5 - 14.5 %    Platelets 194 150 - 350 K/uL    MPV 9.8 9.2 - 12.9 fL    Gran # (ANC) 5.3 1.8 - 7.7 K/uL    Lymph # 2.4 1.0 - 4.8 K/uL    Mono # 1.1 (H) 0.3 - 1.0 K/uL    Eos # 0.3 0.0 - 0.5 K/uL    Baso # 0.04 0.00 - 0.20 K/uL    Gran% 58.0 38.0 - 73.0 %    Lymph% 26.8 18.0 - 48.0 %    Mono% 12.2 4.0 - 15.0 %    Eosinophil% 2.9 0.0 - 8.0 %    Basophil% 0.4 0.0 - 1.9 %    Differential Method Automated    Comprehensive metabolic panel   Result Value Ref Range    Sodium 142 136 - 145 mmol/L    Potassium 3.9 3.5 - 5.1 mmol/L    Chloride 105 95 - 110 mmol/L    CO2 23 23 - 29 mmol/L    Glucose 159 (H) 70 - 110 mg/dL    BUN, Bld 14 8 - 23 mg/dL    Creatinine 1.0 0.5 - 1.4 mg/dL    Calcium 9.7 8.7 - 10.5 mg/dL    Total Protein 7.9 6.0 - 8.4 g/dL    Albumin 4.3 3.5 - 5.2 g/dL    Total Bilirubin 1.1 (H) 0.1 - 1.0 mg/dL    Alkaline Phosphatase 56 55 - 135 U/L    AST 52 (H) 10 - 40 U/L    ALT 70 (H) 10 - 44 U/L    Anion Gap 14 8 - 16 mmol/L    eGFR if African American >60 >60 mL/min/1.73 m^2    eGFR if non African American >60 >60 mL/min/1.73 m^2   Urinalysis Microscopic   Result Value Ref Range    RBC, UA 50 (H) 0 - 4 /hpf    WBC, UA 1 0 - 5 /hpf    Bacteria Few (A) None-Occ /hpf    Yeast, UA Occasional (A) None    Hyaline Casts, UA 0 0-1/lpf /lpf    Microscopic Comment SEE COMMENT               The Emergency Provider reviewed the vital signs and test results, which are outlined above.     ED Discussion     2:29 AM: Reassessed pt at this time.  Pt states his condition has improved at this time.  Discussed with pt all pertinent ED information and results. Discussed pt dx and plan of tx. Gave pt all f/u and return to the ED instructions. All questions and concerns were addressed at this time. Pt expresses understanding of information and instructions, and is comfortable with plan to discharge. Pt is stable for discharge.    I discussed with patient and/or family/caretaker that evaluation in the ED does not suggest any emergent or life threatening medical conditions requiring immediate intervention beyond what was provided in the ED, and I believe patient is safe for discharge.  Regardless, an unremarkable evaluation in the ED does not preclude the development or presence of a serious of life threatening condition. As such, patient was instructed to return immediately for any worsening or change in current symptoms.      ED Medication(s):  Medications - No data to display    Discharge Medication List as of 8/15/2019  2:39 AM      START taking these medications    Details   diazePAM (VALIUM) 5 MG tablet Take 1 tablet (5 mg total) by mouth every 8 (eight) hours as needed for Anxiety., Starting Thu 8/15/2019, Until Sat 9/14/2019, Print             Follow-up Information     Edgard Mendenhall MD In 1 day.    Specialty:  Family Medicine  Contact information:  [address]  [place] [address]  [phone]             [facility] - .    Specialty:  Emergency Medicine  Why:  As needed, If symptoms worsen  Contact information:  [id number] [place] [id number] [phone]                       Medical Decision Making:   Clinical Tests:   Lab Tests: Ordered and Reviewed             Scribe Attestation:   Scribe #1: I performed the above scribed service and the documentation accurately describes the services I performed. I attest to the accuracy of the note.     Attending:   Physician Attestation Statement for Scribe #1: I, Anthony Galindo MD, personally performed the  services described in this documentation, as scribed by Florian Brooks, in my presence, and it is both accurate and complete.           Clinical Impression       ICD-10-CM ICD-9-CM   1. Hematuria, unspecified type R31.9 599.70   2. Anxiety F41.9 300.00       Disposition:   Disposition: Discharged  Condition: Stable         Anthony Galindo MD  08/15/19 0421

## 2019-08-15 NOTE — H&P (VIEW-ONLY)
Chief Complaint: BPH/Stones    HPI:   8/15/19: 4d ago went to ER here for left distal ureteral stone, was transferred to [facility] and had a procedure on his stone, sounds like ESWL (3000 shocks).  Has been to the ER since then, PVR was normal on check.  A soto was placed and no urine product and soto was removed.  Has not passed any fragments.  10/1/18: KUB/US 7/18 reassuring.  Voiding fine.  1/29/18: Laws: One month PVR recheck. Feeling good; voiding back to normal. Good stream. Taking Flomax and Finasteride. No gross hematuria. No SP pain or dysuria.   12/27/17: Laws: Doing well since stent removal last week; feels like he is emptying well. Soto has been out a week. No gross hematuria. No pain at all. Taking Flomax and Finasteride. Says he drinks a lot of water and does not eat a high salt diet.   12/18/17: URS complete, stone removed.  Stent out today.  PVR reassuring, voiding trial.   12/6/17: Was traveling and entered retention he thought he was in retention with 800ml and a soto was placed.  Had a CT after that that showed a kidney stone and was given some pain meds but has had no pain since he was at the ER.  Was using the oxybutinin when he entered retention.  Has a 8mm right UVJ stone.  11/20/17: Cysto today shows very large median lobe but also strong evidence of severe OAB with bladder spasms at 150 ml pushing water out around the scope.  11/17/17: Over last 3-4d got worsening stream, urgency, SP tenderness; last night entered retention.  Hardly anything came out when catheter was put in though (maybe 50ml).  Was having a ton of pain before that; can't say if it was back or bladder or brain.  Catheter did relieve the symptoms though.  No constipation daily BM but looser lately.  No dysuria.  Taking flomax/finasteride.  9/25/17: No problems at all, Reviewed history in detail.  PSA not done.  Two polyps on colonoscopy were assessed, not worrisome.  7/1/16: No new complaints.  Voiding well.  4/12/16: Alycia:  ""This 60-year-old male returns to the clinic today because of another episode of urinary retention following gallbladder surgery he had on Friday.  He was catheterized, and has been doing reasonably well with the Soto catheter.  He would prefer to get the catheter out today if that is possible.  My recommendation is to proceed with a voiding trial, and he agrees."  Voided fine  9/9/15: returns after having left IH repair with Dr. Murphy last Friday and entered retention and was discharge with a soto.  Removed today.   7/1/15: No adverse changes - satisfied. PSA approp on finasteride and he is voiding better.  6/25/14: PSA much reduced now on finasteride.  No urinary bother. Since his last visit he is voiding fine and feeling well. Good stream no dribble.  12/20/13: Saw QUINN Gan recently with hesitancy nd elevated PVR while taking cold medications. Instructed to hold them.  Here today with PVR 47ml.  Started finasteride and has been on it.  Last May he had retention  Had elevated PSA and after the instrumentation was removed his PSA lowered appropriately.    6/10/13: Last note: "This patient was seen earlier this year for an acute onset of urinary retention secondary to laparoscopic bilateral hernia repair. He had a voiding trial but failed and had to go back to the ER that night for another catheter. Patient was started on Flomax and had no concerns for a while with another voiding trial a week later. His psa levels were elevated and he completed 4 weeks of cipro and was to have another psa level assessed. Patient presented again as a follow-up to an acute onset of urinary retention, where he presented to [facility] and a soto was placed. Patient was given Levaquin and had his soto removed. This was his second time this yr with urinary retention. He feels his stream is okay but sometimes weak; occasional splitting. No gross hematuria before the inguinal hernia surgery but has had some associated with " "catheterization."    Allergies:  Allergies   Allergen Reactions    Pcn [Penicillins]     Augmentin [Amoxicillin-Pot Clavulanate] Rash       Medications:  has a current medication list which includes the following prescription(s): aspirin, ciprofloxacin hcl, diazepam, finasteride, olmesartan, and tamsulosin.    PMH:   has a past medical history of BPH (benign prostatic hyperplasia) and HTN (hypertension).    PSH:   has a past surgical history that includes bilateral lap inguinal hernia repair; left ankle; TONSILLECTOMY, ADENOIDECTOMY; Hernia repair; and Cholecystectomy (2016).    FamHx: family history is not on file.    SocHx:  reports that he has never smoked. He has never used smokeless tobacco. He reports that he does not drink alcohol. His drug history is not on file.      Physical Exam:  Vitals:    08/15/19 1557   BP: (!) 112/58   Pulse: 96     General: A&Ox3, no apparent distress, no deformities  Neck: No masses, normal thyroid  Abdomen: Soft, NT, ND; has a large reducible left inguinal hernia  Skin: The skin is warm and dry. No jaundice.  Ext: No c/c/e.  :   12/17/17: deferred (HANDY 9/17)    Labs/Studies:   Urinalysis performed in clinic, summary: UA normal  PVR 12/17/17: 191 -> voided then 55 ml  PVR 1/29/18: 143 -> voided then 67 ml  Bladder Scan performed in office:     8/15/19: PVR 19 ml.  PSA very normal now, 2.2    6/15: 1.6    9/17: 1.6    9/18: 1.3    Impression/Plan:   1. Likely he still has the ureteral stone s/p ESWL.  Oxybutinin as needed for OAB symptoms.    2. CT in 6d with URS 7d if hasn't passed the stone.          "

## 2019-08-15 NOTE — PROGRESS NOTES
Chief Complaint: BPH/Stones    HPI:   8/15/19: 4d ago went to ER here for left distal ureteral stone, was transferred to [facility] and had a procedure on his stone, sounds like ESWL (3000 shocks).  Has been to the ER since then, PVR was normal on check.  A soto was placed and no urine product and soto was removed.  Has not passed any fragments.  10/1/18: KUB/US 7/18 reassuring.  Voiding fine.  1/29/18: Laws: One month PVR recheck. Feeling good; voiding back to normal. Good stream. Taking Flomax and Finasteride. No gross hematuria. No SP pain or dysuria.   12/27/17: Laws: Doing well since stent removal last week; feels like he is emptying well. Soto has been out a week. No gross hematuria. No pain at all. Taking Flomax and Finasteride. Says he drinks a lot of water and does not eat a high salt diet.   12/18/17: URS complete, stone removed.  Stent out today.  PVR reassuring, voiding trial.   12/6/17: Was traveling and entered retention he thought he was in retention with 800ml and a soto was placed.  Had a CT after that that showed a kidney stone and was given some pain meds but has had no pain since he was at the ER.  Was using the oxybutinin when he entered retention.  Has a 8mm right UVJ stone.  11/20/17: Cysto today shows very large median lobe but also strong evidence of severe OAB with bladder spasms at 150 ml pushing water out around the scope.  11/17/17: Over last 3-4d got worsening stream, urgency, SP tenderness; last night entered retention.  Hardly anything came out when catheter was put in though (maybe 50ml).  Was having a ton of pain before that; can't say if it was back or bladder or brain.  Catheter did relieve the symptoms though.  No constipation daily BM but looser lately.  No dysuria.  Taking flomax/finasteride.  9/25/17: No problems at all, Reviewed history in detail.  PSA not done.  Two polyps on colonoscopy were assessed, not worrisome.  7/1/16: No new complaints.  Voiding well.  4/12/16: Alycia:  ""This 60-year-old male returns to the clinic today because of another episode of urinary retention following gallbladder surgery he had on Friday.  He was catheterized, and has been doing reasonably well with the Soto catheter.  He would prefer to get the catheter out today if that is possible.  My recommendation is to proceed with a voiding trial, and he agrees."  Voided fine  9/9/15: returns after having left IH repair with Dr. Murphy last Friday and entered retention and was discharge with a soto.  Removed today.   7/1/15: No adverse changes - satisfied. PSA approp on finasteride and he is voiding better.  6/25/14: PSA much reduced now on finasteride.  No urinary bother. Since his last visit he is voiding fine and feeling well. Good stream no dribble.  12/20/13: Saw QUINN Gan recently with hesitancy nd elevated PVR while taking cold medications. Instructed to hold them.  Here today with PVR 47ml.  Started finasteride and has been on it.  Last May he had retention  Had elevated PSA and after the instrumentation was removed his PSA lowered appropriately.    6/10/13: Last note: "This patient was seen earlier this year for an acute onset of urinary retention secondary to laparoscopic bilateral hernia repair. He had a voiding trial but failed and had to go back to the ER that night for another catheter. Patient was started on Flomax and had no concerns for a while with another voiding trial a week later. His psa levels were elevated and he completed 4 weeks of cipro and was to have another psa level assessed. Patient presented again as a follow-up to an acute onset of urinary retention, where he presented to [facility] and a soto was placed. Patient was given Levaquin and had his soto removed. This was his second time this yr with urinary retention. He feels his stream is okay but sometimes weak; occasional splitting. No gross hematuria before the inguinal hernia surgery but has had some associated with " "catheterization."    Allergies:  Allergies   Allergen Reactions    Pcn [Penicillins]     Augmentin [Amoxicillin-Pot Clavulanate] Rash       Medications:  has a current medication list which includes the following prescription(s): aspirin, ciprofloxacin hcl, diazepam, finasteride, olmesartan, and tamsulosin.    PMH:   has a past medical history of BPH (benign prostatic hyperplasia) and HTN (hypertension).    PSH:   has a past surgical history that includes bilateral lap inguinal hernia repair; left ankle; TONSILLECTOMY, ADENOIDECTOMY; Hernia repair; and Cholecystectomy (2016).    FamHx: family history is not on file.    SocHx:  reports that he has never smoked. He has never used smokeless tobacco. He reports that he does not drink alcohol. His drug history is not on file.      Physical Exam:  Vitals:    08/15/19 1557   BP: (!) 112/58   Pulse: 96     General: A&Ox3, no apparent distress, no deformities  Neck: No masses, normal thyroid  Abdomen: Soft, NT, ND; has a large reducible left inguinal hernia  Skin: The skin is warm and dry. No jaundice.  Ext: No c/c/e.  :   12/17/17: deferred (HANDY 9/17)    Labs/Studies:   Urinalysis performed in clinic, summary: UA normal  PVR 12/17/17: 191 -> voided then 55 ml  PVR 1/29/18: 143 -> voided then 67 ml  Bladder Scan performed in office:     8/15/19: PVR 19 ml.  PSA very normal now, 2.2    6/15: 1.6    9/17: 1.6    9/18: 1.3    Impression/Plan:   1. Likely he still has the ureteral stone s/p ESWL.  Oxybutinin as needed for OAB symptoms.    2. CT in 6d with URS 7d if hasn't passed the stone.          "

## 2019-08-15 NOTE — TELEPHONE ENCOUNTER
Pt called this am stating he was having difficulty voiding since  last Friday and went to [facility].  Saw Dr Mckay and a lithotripsy was done on him. Said he has been having bladder spasms and unable to void but a little bit at a time.  Went to the ER last night and they put a soto in but not much urine return.  Was told he was not in retention.  States he is still having bladder spasms.  Requesting to talk to you.  Please advise

## 2019-08-18 ENCOUNTER — HOSPITAL ENCOUNTER (EMERGENCY)
Facility: HOSPITAL | Age: 64
Discharge: HOME | End: 2019-08-18
Attending: EMERGENCY MEDICINE
Payer: COMMERCIAL

## 2019-08-18 VITALS
WEIGHT: 195 LBS | RESPIRATION RATE: 18 BRPM | OXYGEN SATURATION: 98 % | TEMPERATURE: 98 F | BODY MASS INDEX: 26.45 KG/M2 | HEART RATE: 54 BPM | DIASTOLIC BLOOD PRESSURE: 58 MMHG | SYSTOLIC BLOOD PRESSURE: 105 MMHG

## 2019-08-18 DIAGNOSIS — N20.1 URETERAL CALCULI: Primary | ICD-10-CM

## 2019-08-18 LAB
ALBUMIN SERPL BCP-MCNC: 4 G/DL (ref 3.5–5.2)
ALP SERPL-CCNC: 54 U/L (ref 55–135)
ALT SERPL W/O P-5'-P-CCNC: 63 U/L (ref 10–44)
ANION GAP SERPL CALC-SCNC: 13 MMOL/L (ref 8–16)
AST SERPL-CCNC: 40 U/L (ref 10–40)
BACTERIA #/AREA URNS HPF: ABNORMAL /HPF
BASOPHILS # BLD AUTO: 0.05 K/UL (ref 0–0.2)
BASOPHILS NFR BLD: 0.6 % (ref 0–1.9)
BILIRUB SERPL-MCNC: 1.3 MG/DL (ref 0.1–1)
BILIRUB UR QL STRIP: NEGATIVE
BUN SERPL-MCNC: 29 MG/DL (ref 8–23)
CALCIUM SERPL-MCNC: 9.6 MG/DL (ref 8.7–10.5)
CHLORIDE SERPL-SCNC: 105 MMOL/L (ref 95–110)
CLARITY UR: CLEAR
CO2 SERPL-SCNC: 21 MMOL/L (ref 23–29)
COLOR UR: YELLOW
CREAT SERPL-MCNC: 1.3 MG/DL (ref 0.5–1.4)
DIFFERENTIAL METHOD: ABNORMAL
EOSINOPHIL # BLD AUTO: 0.1 K/UL (ref 0–0.5)
EOSINOPHIL NFR BLD: 0.6 % (ref 0–8)
ERYTHROCYTE [DISTWIDTH] IN BLOOD BY AUTOMATED COUNT: 12.7 % (ref 11.5–14.5)
EST. GFR  (AFRICAN AMERICAN): >60 ML/MIN/1.73 M^2
EST. GFR  (NON AFRICAN AMERICAN): 57 ML/MIN/1.73 M^2
GLUCOSE SERPL-MCNC: 221 MG/DL (ref 70–110)
GLUCOSE UR QL STRIP: ABNORMAL
HCT VFR BLD AUTO: 42.6 % (ref 40–54)
HGB BLD-MCNC: 15.3 G/DL (ref 14–18)
HGB UR QL STRIP: ABNORMAL
HYALINE CASTS #/AREA URNS LPF: 15 /LPF
KETONES UR QL STRIP: NEGATIVE
LEUKOCYTE ESTERASE UR QL STRIP: NEGATIVE
LYMPHOCYTES # BLD AUTO: 0.9 K/UL (ref 1–4.8)
LYMPHOCYTES NFR BLD: 9.9 % (ref 18–48)
MCH RBC QN AUTO: 33 PG (ref 27–31)
MCHC RBC AUTO-ENTMCNC: 35.9 G/DL (ref 32–36)
MCV RBC AUTO: 92 FL (ref 82–98)
MICROSCOPIC COMMENT: ABNORMAL
MONOCYTES # BLD AUTO: 0.7 K/UL (ref 0.3–1)
MONOCYTES NFR BLD: 7.5 % (ref 4–15)
NEUTROPHILS # BLD AUTO: 7.3 K/UL (ref 1.8–7.7)
NEUTROPHILS NFR BLD: 81.7 % (ref 38–73)
NITRITE UR QL STRIP: NEGATIVE
PH UR STRIP: 5 [PH] (ref 5–8)
PLATELET # BLD AUTO: 204 K/UL (ref 150–350)
PMV BLD AUTO: 10 FL (ref 9.2–12.9)
POTASSIUM SERPL-SCNC: 4.2 MMOL/L (ref 3.5–5.1)
PROT SERPL-MCNC: 7.3 G/DL (ref 6–8.4)
PROT UR QL STRIP: ABNORMAL
RBC # BLD AUTO: 4.64 M/UL (ref 4.6–6.2)
RBC #/AREA URNS HPF: 30 /HPF (ref 0–4)
SODIUM SERPL-SCNC: 139 MMOL/L (ref 136–145)
SP GR UR STRIP: >=1.03 (ref 1–1.03)
URN SPEC COLLECT METH UR: ABNORMAL
UROBILINOGEN UR STRIP-ACNC: NEGATIVE EU/DL
WBC # BLD AUTO: 8.97 K/UL (ref 3.9–12.7)
WBC #/AREA URNS HPF: 2 /HPF (ref 0–5)

## 2019-08-18 PROCEDURE — 80053 COMPREHEN METABOLIC PANEL: CPT

## 2019-08-18 PROCEDURE — 85025 COMPLETE CBC W/AUTO DIFF WBC: CPT

## 2019-08-18 PROCEDURE — 81000 URINALYSIS NONAUTO W/SCOPE: CPT

## 2019-08-18 PROCEDURE — 99284 EMERGENCY DEPT VISIT MOD MDM: CPT | Mod: 25

## 2019-08-18 RX ORDER — HYDROCODONE BITARTRATE AND ACETAMINOPHEN 5; 325 MG/1; MG/1
1 TABLET ORAL EVERY 4 HOURS PRN
Qty: 12 TABLET | Refills: 0 | Status: ON HOLD | OUTPATIENT
Start: 2019-08-18 | End: 2019-08-22 | Stop reason: HOSPADM

## 2019-08-18 NOTE — ED NOTES
Pt c/o 8/10 left flank this AM. Pt reports taking 2.5mg of Xanax and Oxybutynin 5mg PTA. Pain improved - currently 2/10. Reports lithotripsy on 8/12/19 with Dr. Mckay. Gross hematuria yesterday. Denies fever, n/v/d, dysuria.    Patient identifiers verified and correct for Delfino Monk.    LOC: The patient is awake, alert and aware of environment with an appropriate affect, the patient is oriented x 3 and speaking appropriately.  APPEARANCE: Patient resting comfortably and in no acute distress, patient is clean and well groomed, patient's clothing is properly fastened.  SKIN: The skin is warm and dry, color consistent with ethnicity, patient has normal skin turgor and moist mucus membranes, skin intact, no breakdown or bruising noted.  MUSCULOSKELETAL: Patient moving all extremities spontaneously.  RESPIRATORY: Airway is open and patent, respirations are spontaneous.  CARDIAC: Patient bradycardic, no peripheral edema noted, capillary refill < 3 seconds.  ABDOMEN: Soft and non tender to palpation.

## 2019-08-18 NOTE — ED NOTES
The patient is resting quietly, eyes closed, arouses easily to stimuli. Airway is open and patent, respirations are spontaneous, normal respiratory effort and rate noted, skin warm and dry, appearance: in no acute distress and resting comfortably, wife at bedside. Will continue to monitor.

## 2019-08-18 NOTE — ED PROVIDER NOTES
SCRIBE #1 NOTE: Briana DIXON am scribing for, and in the presence of, Anthony Galindo MD. I have scribed the entire note.       History     Chief Complaint   Patient presents with    Flank Pain     left flank pain     Review of patient's allergies indicates:   Allergen Reactions    Augmentin [amoxicillin-pot clavulanate] Rash    Pcn [penicillins] Rash         History of Present Illness     HPI    8/18/2019, 8:28 AM  History obtained from the patient      History of Present Illness: Delfino Monk is a 64 y.o. male patient with a PMHx of HTN, BPH, kidney stone who presents to the Emergency Department for evaluation of L flank pain which onset suddenly this morning. The pain has been waxing and waning and currently 2/10 severity. Patient was seen here on 8/11/19 and transferred to [facility] for a 10 x 5 mm stone in the distal left ureter. He had laser lithotripsy at [facility] on 8/12 and came to ED on 8/15 for evaluation of urinary retention. Patient f/u with Dr. Green (Urology) who believed that patient likely still had a ureteral stone and planned to obtain a CT. Pt reports having suprapubic pain and took Oxybutynin prescribed by Dr. Green. Patient states that his pain gradually began to worsen this morning but has improved. No modifying factors noted. Patient denies fever, chills, N/V, dysuria, hematuria, and all other sxs at this time.    Arrival mode: Personal vehicle    PCP: Edgard Mendenhall MD        Past Medical History:  Past Medical History:   Diagnosis Date    BPH (benign prostatic hyperplasia)     HTN (hypertension)     Kidney stone        Past Surgical History:  Past Surgical History:   Procedure Laterality Date    bilateral lap inguinal hernia repair      CHOLECYSTECTOMY  2016    CYSTOSCOPY WITH STENT PLACEMENT Right 12/12/2017    Performed by [facility], [facility] at [facility] OR    EXTRACTION-STONE-URETEROSCOPY Right 12/12/2017    Performed by [facility], [facility] at [facility] OR     HERNIA REPAIR      left ankle      REPAIR-HERNIA-INGUINAL RECURRENT Left 9/4/2015    [facility]    TONSILLECTOMY, ADENOIDECTOMY           Family History:  History reviewed. No pertinent family history.    Social History:  Social History     Tobacco Use    Smoking status: Never Smoker    Smokeless tobacco: Never Used   Substance and Sexual Activity    Alcohol use: No     Alcohol/week: 0.0 oz    Drug use: Never    Sexual activity: Unknown        Review of Systems     Review of Systems   Constitutional: Negative for chills and fever.   HENT: Negative for sore throat.    Respiratory: Negative for shortness of breath.    Cardiovascular: Negative for chest pain.   Gastrointestinal: Positive for abdominal pain. Negative for constipation, diarrhea, nausea and vomiting.   Genitourinary: Positive for flank pain. Negative for dysuria and hematuria.   Musculoskeletal: Negative for back pain.   Skin: Negative for rash.   Neurological: Negative for weakness.   Hematological: Does not bruise/bleed easily.   All other systems reviewed and are negative.     Physical Exam     Initial Vitals [08/18/19 0806]   BP Pulse Resp Temp SpO2   117/62 (!) 57 18 97.5 °F (36.4 °C) 96 %      MAP       --          Physical Exam  Nursing Notes and Vital Signs Reviewed.  Constitutional: Patient is in no acute distress. Well-developed and well-nourished.  Head: Atraumatic. Normocephalic.  Eyes: PERRL. EOM intact. Conjunctivae are not pale. No scleral icterus.  ENT: Mucous membranes are moist. Oropharynx is clear and symmetric.    Neck: Supple. Full ROM. No lymphadenopathy.  Cardiovascular: Bradycardic. Regular rhythm. No murmurs, rubs, or gallops. Distal pulses are 2+ and symmetric.  Pulmonary/Chest: No respiratory distress. Clear to auscultation bilaterally. No wheezing or rales.  Abdominal: Soft and non-distended.  There is no tenderness.  No rebound, guarding, or rigidity. Good bowel sounds.  Genitourinary:  No CVA tenderness  Musculoskeletal: Moves all extremities. No obvious deformities.   Skin: Warm and dry.  Neurological:  Alert, awake, and appropriate.  Normal speech.  No acute focal neurological deficits are appreciated.  Psychiatric: Normal affect. Good eye contact. Appropriate in content.     ED Course   Procedures  ED Vital Signs:  Vitals:    08/18/19 0806 08/18/19 0840 08/18/19 0930   BP: 117/62  (!) 105/58   Pulse: (!) 57  (!) 54   Resp: 18     Temp: 97.5 °F (36.4 °C)     TempSrc: Oral     SpO2: 96%  98%   Weight:  88.5 kg (195 lb)        Abnormal Lab Results:  Labs Reviewed   CBC W/ AUTO DIFFERENTIAL - Abnormal; Notable for the following components:       Result Value    Mean Corpuscular Hemoglobin 33.0 (*)     Lymph # 0.9 (*)     Gran% 81.7 (*)     Lymph% 9.9 (*)     All other components within normal limits   COMPREHENSIVE METABOLIC PANEL - Abnormal; Notable for the following components:    CO2 21 (*)     Glucose 221 (*)     BUN, Bld 29 (*)     Total Bilirubin 1.3 (*)     Alkaline Phosphatase 54 (*)     ALT 63 (*)     eGFR if non  57 (*)     All other components within normal limits   URINALYSIS, REFLEX TO URINE CULTURE - Abnormal; Notable for the following components:    Specific Gravity, UA >=1.030 (*)     Protein, UA 1+ (*)     Glucose, UA 1+ (*)     Occult Blood UA 3+ (*)     All other components within normal limits    Narrative:     Preferred Collection Type->Urine, Clean Catch   URINALYSIS MICROSCOPIC - Abnormal; Notable for the following components:    RBC, UA 30 (*)     Bacteria Few (*)     Hyaline Casts, UA 15 (*)     All other components within normal limits    Narrative:     Preferred Collection Type->Urine, Clean Catch        All Lab Results:  Results for orders placed or performed during the hospital encounter of 08/18/19   CBC auto differential   Result Value Ref Range    WBC 8.97 3.90 - 12.70 K/uL    RBC 4.64 4.60 - 6.20 M/uL    Hemoglobin 15.3 14.0 - 18.0 g/dL    Hematocrit  42.6 40.0 - 54.0 %    Mean Corpuscular Volume 92 82 - 98 fL    Mean Corpuscular Hemoglobin 33.0 (H) 27.0 - 31.0 pg    Mean Corpuscular Hemoglobin Conc 35.9 32.0 - 36.0 g/dL    RDW 12.7 11.5 - 14.5 %    Platelets 204 150 - 350 K/uL    MPV 10.0 9.2 - 12.9 fL    Gran # (ANC) 7.3 1.8 - 7.7 K/uL    Lymph # 0.9 (L) 1.0 - 4.8 K/uL    Mono # 0.7 0.3 - 1.0 K/uL    Eos # 0.1 0.0 - 0.5 K/uL    Baso # 0.05 0.00 - 0.20 K/uL    Gran% 81.7 (H) 38.0 - 73.0 %    Lymph% 9.9 (L) 18.0 - 48.0 %    Mono% 7.5 4.0 - 15.0 %    Eosinophil% 0.6 0.0 - 8.0 %    Basophil% 0.6 0.0 - 1.9 %    Differential Method Automated    Comprehensive metabolic panel   Result Value Ref Range    Sodium 139 136 - 145 mmol/L    Potassium 4.2 3.5 - 5.1 mmol/L    Chloride 105 95 - 110 mmol/L    CO2 21 (L) 23 - 29 mmol/L    Glucose 221 (H) 70 - 110 mg/dL    BUN, Bld 29 (H) 8 - 23 mg/dL    Creatinine 1.3 0.5 - 1.4 mg/dL    Calcium 9.6 8.7 - 10.5 mg/dL    Total Protein 7.3 6.0 - 8.4 g/dL    Albumin 4.0 3.5 - 5.2 g/dL    Total Bilirubin 1.3 (H) 0.1 - 1.0 mg/dL    Alkaline Phosphatase 54 (L) 55 - 135 U/L    AST 40 10 - 40 U/L    ALT 63 (H) 10 - 44 U/L    Anion Gap 13 8 - 16 mmol/L    eGFR if African American >60 >60 mL/min/1.73 m^2    eGFR if non African American 57 (A) >60 mL/min/1.73 m^2   Urinalysis, Reflex to Urine Culture Urine, Clean Catch   Result Value Ref Range    Specimen UA Urine, Clean Catch     Color, UA Yellow Yellow, Straw, Kyra    Appearance, UA Clear Clear    pH, UA 5.0 5.0 - 8.0    Specific Gravity, UA >=1.030 (A) 1.005 - 1.030    Protein, UA 1+ (A) Negative    Glucose, UA 1+ (A) Negative    Ketones, UA Negative Negative    Bilirubin (UA) Negative Negative    Occult Blood UA 3+ (A) Negative    Nitrite, UA Negative Negative    Urobilinogen, UA Negative <2.0 EU/dL    Leukocytes, UA Negative Negative   Urinalysis Microscopic   Result Value Ref Range    RBC, UA 30 (H) 0 - 4 /hpf    WBC, UA 2 0 - 5 /hpf    Bacteria Few (A) None-Occ /hpf    Hyaline Casts,  UA 15 (A) 0-1/lpf /lpf    Microscopic Comment SEE COMMENT      Imaging Results:  Imaging Results          CT Renal Stone Study ABD Pelvis WO (Final result)  Result time 08/18/19 08:56:51    Final result by Bradly Zuniga III, MD (08/18/19 08:56:51)                 Impression:      Persistent 9 mm obstructing distal left ureteral calculus causing mild hydronephrosis.  Stable prostate enlargement.    All CT scans at this facility are performed  using dose modulation techniques as appropriate to performed exam including the following:  automated exposure control; adjustment of mA and/or kV according to the patients size (this includes techniques or standardized protocols for targeted exams where dose is matched to indication/reason for exam: i.e. extremities or head);  iterative reconstruction technique.      Electronically signed by: Bradly Zuniga MD  Date:    08/18/2019  Time:    08:56             Narrative:    EXAMINATION:  CT RENAL STONE STUDY ABD PELVIS WO    CLINICAL HISTORY:  Flank pain, recurrent stone disease suspected; left flank pain    TECHNIQUE:  Routine CT abdomen and pelvis performed without IV contrast.  Coronal and sagittal reformatted images obtained.    COMPARISON:  08/11/2019    FINDINGS:  No acute disease identified in the lung bases.  Stable chronic bilateral L5 pars defects with grade 1 spondylolisthesis of L5 on S1.  No acute osseous abnormality or suspicious bone lesion identified.    There is a persistent 9 mm obstructing calculus in the distal left ureter just proximal to the UVJ causing mild left hydroureteronephrosis.  The right kidney and right ureter are unremarkable.  There is stable prostate enlargement with mass effect upon the bladder.  The bladder is incompletely distended.  No bladder calculi identified.    Stable fatty infiltration of the liver.  Prior cholecystectomy is again noted.  The unenhanced pancreas, bile ducts, spleen and adrenals are unremarkable.    There is a stable  fat containing left inguinal hernia.  An adjacent sigmoid diverticulum extends into the proximal hernia.  Colonic diverticulosis without acute diverticulitis.  There is no evidence of bowel obstruction, active bowel inflammation or other acute bowel pathology.  There is no evidence of appendicitis.  No free intraperitoneal air, free fluid or abscess identified.  The aorta is normal in caliber.  No pathologically enlarged lymph nodes identified.                                      The Emergency Provider reviewed the vital signs and test results, which are outlined above.     ED Discussion     9:55 AM: Discussed with pt all pertinent ED information and results. Discussed pt dx and plan of tx. Will prescribe Norco for pain. Gave pt all f/u and return to the ED instructions. All questions and concerns were addressed at this time. Pt expresses understanding of information and instructions, and is comfortable with plan to discharge. Pt is stable for discharge.    I discussed with patient and/or family/caretaker that evaluation in the ED does not suggest any emergent or life threatening medical conditions requiring immediate intervention beyond what was provided in the ED, and I believe patient is safe for discharge.  Regardless, an unremarkable evaluation in the ED does not preclude the development or presence of a serious of life threatening condition. As such, patient was instructed to return immediately for any worsening or change in current symptoms.      ED Medication(s):  Medications - No data to display    Discharge Medication List as of 8/18/2019  9:51 AM      START taking these medications    Details   HYDROcodone-acetaminophen (NORCO) 5-325 mg per tablet Take 1 tablet by mouth every 4 (four) hours as needed for Pain., Starting Sun 8/18/2019, Until Wed 8/28/2019, Print             Follow-up Information     Bradly Green IV, MD In 3 days.    Specialty:  Urology  Contact information:  [address] [facility]  [place] [address]  [phone]             [facility] - .    Specialty:  Emergency Medicine  Why:  As needed, If symptoms worsen  Contact information:  [address]  [facility] [address] [phone]                   Medical Decision Making:   Clinical Tests:   Lab Tests: Ordered and Reviewed  Radiological Study: Ordered and Reviewed         Scribe Attestation:   Scribe #1: I performed the above scribed service and the documentation accurately describes the services I performed. I attest to the accuracy of the note.     Attending:   Physician Attestation Statement for Scribe #1: I, Anthony Galindo MD, personally performed the services described in this documentation, as scribed by Briana Haley, in my presence, and it is both accurate and complete.           Clinical Impression       ICD-10-CM ICD-9-CM   1. Ureteral calculi N20.1 592.1       Disposition:   Disposition: Discharged  Condition: Stable         Anthony Galindo MD  08/18/19 7627

## 2019-08-19 ENCOUNTER — TELEPHONE (OUTPATIENT)
Dept: UROLOGY | Facility: CLINIC | Age: 64
End: 2019-08-19

## 2019-08-19 NOTE — TELEPHONE ENCOUNTER
Returned call to pt; states he went to the ER over the weekend and had a CT scan done.  He is scheduled to have CT done this Wednesday; does he still need to have it done?

## 2019-08-19 NOTE — TELEPHONE ENCOUNTER
Attempted to contact pt to inform him that MD did want him to have the CT scan done. No answer.  LM on vm..

## 2019-08-20 NOTE — PRE-PROCEDURE INSTRUCTIONS
Pre op instructions reviewed with patient per phone:    To confirm, Your surgeon has instructed you:  Surgery is scheduled 08/22/19 at per MD.      [facility] main lobby by per MD.       INSTRUCTIONS IMPORTANT!!!  ¨ Do not eat, drink, or smoke after 12 midnight-including water. OK to brush teeth, no gum, candy or mints!    ¨ Take only these medicines with a small swallow of water-morning of surgery.  Valium  ____  Do not wear makeup, including mascara.  ____  No powder, lotions or creams to surgical area.  ____  Please remove all jewelry, including piercings and leave at home.  ____  No money or valuables needed. Please leave at home.  ____  Please bring identification and insurance information to hospital.  ____  If going home the same day, arrange for a ride home. You will not be able to   drive if Anesthesia was used.  ____  Children, under 12 years old, must remain in the waiting room with an adult.  They are not allowed in patient areas.  ____  Wear loose fitting clothing. Allow for dressings, bandages.  ____  Stop Aspirin, Ibuprofen, Motrin and Aleve at least 5-7 days before surgery, unless otherwise instructed by your doctor, or the nurse.   You MAY use Tylenol/acetaminophen until day of surgery.  ____  If you take diabetic medication, do not take am of surgery unless instructed by   Doctor.  ____ Stop taking any Fish Oil supplement or any Vitamins that contain Vitamin E at least 5 days prior to surgery.          Bathing Instructions-- The night before surgery and the morning prior to coming to the hospital:   -Do not shave the surgical area.   -Shower and wash your hair and body as usual with anti-bacterial  soap and shampoo.   -Rinse your hair and body completely.   -Rinse your body thoroughly.   -Dry with clean, soft towel.  Do not use lotion, cream, deodorant, or powders on   the surgical site.    Use antibacterial soap in place of hibiclens if your  surgery is on the head, face or genitals.         Surgical Site Infection    Prevention of surgical site infections:     -Keep incisions clean and dry.   -Do not soak/submerge incisions in water until completely healed.   -Do not apply lotions, powders, creams, or deodorants to site.   -Always make sure hands are cleaned with antibacterial soap/ alcohol-based   prior to touching the surgical site.  (This includes doctors, nurses, staff, and yourself.)    Signs and symptoms:   -Redness and pain around the area where you had surgery   -Drainage of cloudy fluid from your surgical wound   -Fever over 100.4  I have read or had read and explained to me, and understand the above information.

## 2019-08-21 ENCOUNTER — TELEPHONE (OUTPATIENT)
Dept: UROLOGY | Facility: CLINIC | Age: 64
End: 2019-08-21

## 2019-08-21 ENCOUNTER — HOSPITAL ENCOUNTER (OUTPATIENT)
Dept: RADIOLOGY | Facility: HOSPITAL | Age: 64
Discharge: HOME | End: 2019-08-21
Attending: UROLOGY
Payer: COMMERCIAL

## 2019-08-21 ENCOUNTER — HOSPITAL ENCOUNTER (OUTPATIENT)
Dept: CARDIOLOGY | Facility: CLINIC | Age: 64
Discharge: HOME | End: 2019-08-21
Payer: COMMERCIAL

## 2019-08-21 DIAGNOSIS — N20.1 URETERAL STONE: ICD-10-CM

## 2019-08-21 PROCEDURE — 93010 EKG 12-LEAD: ICD-10-PCS | Mod: S$GLB,,, | Performed by: INTERNAL MEDICINE

## 2019-08-21 PROCEDURE — 93005 ELECTROCARDIOGRAM TRACING: CPT | Mod: S$GLB,,, | Performed by: UROLOGY

## 2019-08-21 PROCEDURE — 74176 CT ABD & PELVIS W/O CONTRAST: CPT | Mod: 26,,, | Performed by: RADIOLOGY

## 2019-08-21 PROCEDURE — 74176 CT ABD & PELVIS W/O CONTRAST: CPT | Mod: TC,PO

## 2019-08-21 PROCEDURE — 74176 CT RENAL STONE STUDY ABD PELVIS WO: ICD-10-PCS | Mod: 26,,, | Performed by: RADIOLOGY

## 2019-08-21 PROCEDURE — 93005 EKG 12-LEAD: ICD-10-PCS | Mod: S$GLB,,, | Performed by: UROLOGY

## 2019-08-21 PROCEDURE — 93010 ELECTROCARDIOGRAM REPORT: CPT | Mod: S$GLB,,, | Performed by: INTERNAL MEDICINE

## 2019-08-21 NOTE — TELEPHONE ENCOUNTER
Pt was informed that his surgery arrival time for tomorrow is at 7:30am; verbalized understanding.

## 2019-08-22 ENCOUNTER — TELEPHONE (OUTPATIENT)
Dept: UROLOGY | Facility: CLINIC | Age: 64
End: 2019-08-22

## 2019-08-22 ENCOUNTER — ANESTHESIA EVENT (OUTPATIENT)
Dept: SURGERY | Facility: HOSPITAL | Age: 64
End: 2019-08-22
Payer: COMMERCIAL

## 2019-08-22 ENCOUNTER — HOSPITAL ENCOUNTER (OUTPATIENT)
Facility: HOSPITAL | Age: 64
Discharge: HOME | End: 2019-08-22
Attending: UROLOGY | Admitting: UROLOGY
Payer: COMMERCIAL

## 2019-08-22 ENCOUNTER — ANESTHESIA (OUTPATIENT)
Dept: SURGERY | Facility: HOSPITAL | Age: 64
End: 2019-08-22
Payer: COMMERCIAL

## 2019-08-22 DIAGNOSIS — N20.1 URETERAL STONE: ICD-10-CM

## 2019-08-22 PROCEDURE — 36000707: Performed by: UROLOGY

## 2019-08-22 PROCEDURE — 52332 CYSTOSCOPY AND TREATMENT: CPT | Mod: LT,,, | Performed by: UROLOGY

## 2019-08-22 PROCEDURE — 76000 FLUOROSCOPY <1 HR PHYS/QHP: CPT | Mod: 26,59,, | Performed by: UROLOGY

## 2019-08-22 PROCEDURE — 71000015 HC POSTOP RECOV 1ST HR: Performed by: UROLOGY

## 2019-08-22 PROCEDURE — 71000033 HC RECOVERY, INTIAL HOUR: Performed by: UROLOGY

## 2019-08-22 PROCEDURE — 37000008 HC ANESTHESIA 1ST 15 MINUTES: Performed by: UROLOGY

## 2019-08-22 PROCEDURE — 63600175 PHARM REV CODE 636 W HCPCS: Performed by: ANESTHESIOLOGY

## 2019-08-22 PROCEDURE — 76000 PR  FLUOROSCOPE EXAMINATION: ICD-10-PCS | Mod: 26,59,, | Performed by: UROLOGY

## 2019-08-22 PROCEDURE — 25500020 PHARM REV CODE 255: Performed by: UROLOGY

## 2019-08-22 PROCEDURE — 63600175 PHARM REV CODE 636 W HCPCS: Performed by: UROLOGY

## 2019-08-22 PROCEDURE — 37000009 HC ANESTHESIA EA ADD 15 MINS: Performed by: UROLOGY

## 2019-08-22 PROCEDURE — 25000003 PHARM REV CODE 250: Performed by: NURSE ANESTHETIST, CERTIFIED REGISTERED

## 2019-08-22 PROCEDURE — C2617 STENT, NON-COR, TEM W/O DEL: HCPCS | Performed by: UROLOGY

## 2019-08-22 PROCEDURE — 74420 UROGRAPHY RTRGR +-KUB: CPT | Mod: 26,,, | Performed by: UROLOGY

## 2019-08-22 PROCEDURE — 74420 PR  X-RAY RETROGRADE PYELOGRAM: ICD-10-PCS | Mod: 26,,, | Performed by: UROLOGY

## 2019-08-22 PROCEDURE — C1758 CATHETER, URETERAL: HCPCS | Performed by: UROLOGY

## 2019-08-22 PROCEDURE — 36000706: Performed by: UROLOGY

## 2019-08-22 PROCEDURE — C1769 GUIDE WIRE: HCPCS | Performed by: UROLOGY

## 2019-08-22 PROCEDURE — 52332 PR CYSTOSCOPY,INSERT URETERAL STENT: ICD-10-PCS | Mod: LT,,, | Performed by: UROLOGY

## 2019-08-22 PROCEDURE — 63600175 PHARM REV CODE 636 W HCPCS: Performed by: NURSE ANESTHETIST, CERTIFIED REGISTERED

## 2019-08-22 PROCEDURE — 71000039 HC RECOVERY, EACH ADD'L HOUR: Performed by: UROLOGY

## 2019-08-22 PROCEDURE — 25000003 PHARM REV CODE 250: Performed by: UROLOGY

## 2019-08-22 DEVICE — STENT URETERAL UNIV 6FR 28CM
Type: IMPLANTABLE DEVICE | Site: URETER | Status: NON-FUNCTIONAL
Removed: 2019-09-24

## 2019-08-22 RX ORDER — MIDAZOLAM HYDROCHLORIDE 1 MG/ML
INJECTION, SOLUTION INTRAMUSCULAR; INTRAVENOUS
Status: DISCONTINUED | OUTPATIENT
Start: 2019-08-22 | End: 2019-08-22

## 2019-08-22 RX ORDER — FENTANYL CITRATE 50 UG/ML
INJECTION, SOLUTION INTRAMUSCULAR; INTRAVENOUS
Status: DISCONTINUED | OUTPATIENT
Start: 2019-08-22 | End: 2019-08-22

## 2019-08-22 RX ORDER — FENTANYL CITRATE 50 UG/ML
25 INJECTION, SOLUTION INTRAMUSCULAR; INTRAVENOUS EVERY 5 MIN PRN
Status: COMPLETED | OUTPATIENT
Start: 2019-08-22 | End: 2019-08-22

## 2019-08-22 RX ORDER — HYDROCODONE BITARTRATE AND ACETAMINOPHEN 10; 325 MG/1; MG/1
1 TABLET ORAL EVERY 4 HOURS PRN
Status: DISCONTINUED | OUTPATIENT
Start: 2019-08-22 | End: 2019-08-22 | Stop reason: HOSPADM

## 2019-08-22 RX ORDER — OXYCODONE AND ACETAMINOPHEN 5; 325 MG/1; MG/1
1-2 TABLET ORAL EVERY 4 HOURS PRN
Qty: 30 TABLET | Refills: 0 | Status: ON HOLD | OUTPATIENT
Start: 2019-08-22 | End: 2019-09-24 | Stop reason: SDUPTHER

## 2019-08-22 RX ORDER — ONDANSETRON 8 MG/1
8 TABLET, ORALLY DISINTEGRATING ORAL EVERY 8 HOURS PRN
Status: DISCONTINUED | OUTPATIENT
Start: 2019-08-22 | End: 2019-08-22 | Stop reason: HOSPADM

## 2019-08-22 RX ORDER — FUROSEMIDE 10 MG/ML
INJECTION INTRAMUSCULAR; INTRAVENOUS
Status: DISCONTINUED | OUTPATIENT
Start: 2019-08-22 | End: 2019-08-22

## 2019-08-22 RX ORDER — ROCURONIUM BROMIDE 10 MG/ML
INJECTION, SOLUTION INTRAVENOUS
Status: DISCONTINUED | OUTPATIENT
Start: 2019-08-22 | End: 2019-08-22

## 2019-08-22 RX ORDER — SODIUM CHLORIDE, SODIUM LACTATE, POTASSIUM CHLORIDE, CALCIUM CHLORIDE 600; 310; 30; 20 MG/100ML; MG/100ML; MG/100ML; MG/100ML
INJECTION, SOLUTION INTRAVENOUS CONTINUOUS PRN
Status: DISCONTINUED | OUTPATIENT
Start: 2019-08-22 | End: 2019-08-22

## 2019-08-22 RX ORDER — CIPROFLOXACIN 500 MG/1
500 TABLET ORAL EVERY 12 HOURS
Qty: 6 TABLET | Refills: 0 | Status: SHIPPED | OUTPATIENT
Start: 2019-08-22 | End: 2019-08-25

## 2019-08-22 RX ORDER — DOCUSATE SODIUM 100 MG/1
100 CAPSULE, LIQUID FILLED ORAL 2 TIMES DAILY
Qty: 60 CAPSULE | Refills: 0 | Status: SHIPPED | OUTPATIENT
Start: 2019-08-22

## 2019-08-22 RX ORDER — HYDROMORPHONE HYDROCHLORIDE 2 MG/ML
0.2 INJECTION, SOLUTION INTRAMUSCULAR; INTRAVENOUS; SUBCUTANEOUS EVERY 5 MIN PRN
Status: COMPLETED | OUTPATIENT
Start: 2019-08-22 | End: 2019-08-22

## 2019-08-22 RX ORDER — ONDANSETRON 2 MG/ML
INJECTION INTRAMUSCULAR; INTRAVENOUS
Status: DISCONTINUED | OUTPATIENT
Start: 2019-08-22 | End: 2019-08-22

## 2019-08-22 RX ORDER — LIDOCAINE HYDROCHLORIDE 10 MG/ML
INJECTION, SOLUTION EPIDURAL; INFILTRATION; INTRACAUDAL; PERINEURAL
Status: DISCONTINUED | OUTPATIENT
Start: 2019-08-22 | End: 2019-08-22

## 2019-08-22 RX ORDER — ONDANSETRON 2 MG/ML
4 INJECTION INTRAMUSCULAR; INTRAVENOUS DAILY PRN
Status: DISCONTINUED | OUTPATIENT
Start: 2019-08-22 | End: 2019-08-22 | Stop reason: HOSPADM

## 2019-08-22 RX ORDER — PROPOFOL 10 MG/ML
VIAL (ML) INTRAVENOUS
Status: DISCONTINUED | OUTPATIENT
Start: 2019-08-22 | End: 2019-08-22

## 2019-08-22 RX ORDER — CEFAZOLIN SODIUM 2 G/50ML
2 SOLUTION INTRAVENOUS
Status: DISCONTINUED | OUTPATIENT
Start: 2019-08-22 | End: 2019-08-22 | Stop reason: HOSPADM

## 2019-08-22 RX ORDER — HYDROCODONE BITARTRATE AND ACETAMINOPHEN 5; 325 MG/1; MG/1
1 TABLET ORAL EVERY 4 HOURS PRN
Status: DISCONTINUED | OUTPATIENT
Start: 2019-08-22 | End: 2019-08-22 | Stop reason: HOSPADM

## 2019-08-22 RX ADMIN — HYDROMORPHONE HYDROCHLORIDE 0.2 MG: 2 INJECTION, SOLUTION INTRAMUSCULAR; INTRAVENOUS; SUBCUTANEOUS at 12:08

## 2019-08-22 RX ADMIN — FENTANYL CITRATE 25 MCG: 0.05 INJECTION, SOLUTION INTRAMUSCULAR; INTRAVENOUS at 11:08

## 2019-08-22 RX ADMIN — FENTANYL CITRATE 25 MCG: 0.05 INJECTION, SOLUTION INTRAMUSCULAR; INTRAVENOUS at 12:08

## 2019-08-22 RX ADMIN — ROCURONIUM BROMIDE 20 MG: 10 INJECTION, SOLUTION INTRAVENOUS at 10:08

## 2019-08-22 RX ADMIN — HYDROCODONE BITARTRATE AND ACETAMINOPHEN 1 TABLET: 5; 325 TABLET ORAL at 11:08

## 2019-08-22 RX ADMIN — MIDAZOLAM 2 MG: 1 INJECTION INTRAMUSCULAR; INTRAVENOUS at 09:08

## 2019-08-22 RX ADMIN — FUROSEMIDE 10 MG: 10 INJECTION, SOLUTION INTRAMUSCULAR; INTRAVENOUS at 10:08

## 2019-08-22 RX ADMIN — PROPOFOL 150 MG: 10 INJECTION, EMULSION INTRAVENOUS at 09:08

## 2019-08-22 RX ADMIN — FENTANYL CITRATE 100 MCG: 50 INJECTION, SOLUTION INTRAMUSCULAR; INTRAVENOUS at 09:08

## 2019-08-22 RX ADMIN — ONDANSETRON 4 MG: 2 INJECTION, SOLUTION INTRAMUSCULAR; INTRAVENOUS at 10:08

## 2019-08-22 RX ADMIN — LIDOCAINE HYDROCHLORIDE 50 MG: 10 INJECTION, SOLUTION EPIDURAL; INFILTRATION; INTRACAUDAL; PERINEURAL at 09:08

## 2019-08-22 RX ADMIN — CEFAZOLIN SODIUM 2 G: 2 SOLUTION INTRAVENOUS at 09:08

## 2019-08-22 RX ADMIN — SODIUM CHLORIDE, SODIUM LACTATE, POTASSIUM CHLORIDE, AND CALCIUM CHLORIDE: 600; 310; 30; 20 INJECTION, SOLUTION INTRAVENOUS at 09:08

## 2019-08-22 NOTE — ANESTHESIA POSTPROCEDURE EVALUATION
Anesthesia Post Evaluation    Patient: Delfino Monk    Procedure(s) Performed: Procedure(s) (LRB):  CYSTOSCOPY, WITH URETERAL STENT INSERTION (Left)  PYELOGRAM, RETROGRADE (Left)  URETEROSCOPY (Left)    Final Anesthesia Type: general  Patient location during evaluation: PACU  Patient participation: Yes- Able to Participate  Level of consciousness: awake and alert  Post-procedure vital signs: reviewed and stable  Pain management: adequate  Airway patency: patent  PONV status at discharge: No PONV  Anesthetic complications: no      Cardiovascular status: hemodynamically stable  Respiratory status: spontaneous ventilation  Hydration status: euvolemic  Follow-up not needed.          Vitals Value Taken Time   /81 8/22/2019 12:29 PM   Temp 36.4 °C (97.5 °F) 8/22/2019 10:59 AM   Pulse 62 8/22/2019 12:29 PM   Resp 41 8/22/2019 12:29 PM   SpO2 95 % 8/22/2019 12:29 PM   Vitals shown include unvalidated device data.      No case tracking events are documented in the log.      Pain/Natalee Score: Pain Rating Prior to Med Admin: 8 (8/22/2019 12:27 PM)  Natalee Score: 10 (8/22/2019 12:15 PM)

## 2019-08-22 NOTE — ANESTHESIA PREPROCEDURE EVALUATION
08/22/2019  Delfino Monk is a 64 y.o., male.    Anesthesia Evaluation    I have reviewed the Patient Summary Reports.    I have reviewed the Nursing Notes.   I have reviewed the Medications.     Review of Systems  Anesthesia Hx:  No problems with previous Anesthesia Denies Hx of Anesthetic complications  Neg history of prior surgery. Denies Family Hx of Anesthesia complications.   Denies Personal Hx of Anesthesia complications.   Social:  Non-Smoker, No Alcohol Use    Hematology/Oncology:  Hematology Normal   Oncology Normal     EENT/Dental:EENT/Dental Normal   Cardiovascular:   Exercise tolerance: good Hypertension NYHA Classification I ECG has been reviewed.  Hypertension, Essential Hypertension    Pulmonary:  Pulmonary Normal    Renal/:   Chronic Renal Disease renal calculi    Hepatic/GI:  Hepatic/GI Normal    Musculoskeletal:  Musculoskeletal Normal    Neurological:   Neuromuscular Disease,    Endocrine:  Endocrine Normal    Dermatological:  Skin Normal    Psych:  Psychiatric Normal           Physical Exam  General:  Well nourished    Airway/Jaw/Neck:  Airway Findings: Mouth Opening: Normal Tongue: Normal  General Airway Assessment: Adult, Possible difficult intubation  Mallampati: IV  Improves to III with phonation.  TM Distance: < 4 cm  Jaw/Neck Findings:  Neck ROM: Normal ROM  Neck Findings:  Short Neck      Dental:  Dental Findings: In tact   Chest/Lungs:  Chest/Lungs Findings: Clear to auscultation, Normal Respiratory Rate     Heart/Vascular:  Heart Findings: Rate: Normal  Rhythm: Regular Rhythm  Sounds: Normal        Mental Status:  Mental Status Findings:  Cooperative, Alert and Oriented         Anesthesia Plan  Type of Anesthesia, risks & benefits discussed:  Anesthesia Type:  general  Patient's Preference:   Intra-op Monitoring Plan: standard ASA monitors  Intra-op Monitoring Plan  Comments:   Post Op Pain Control Plan: per primary service following discharge from PACU  Post Op Pain Control Plan Comments:   Induction:   IV  Beta Blocker:  Patient is not currently on a Beta-Blocker (No further documentation required).       Informed Consent: Patient understands risks and agrees with Anesthesia plan.  Questions answered. Anesthesia consent signed with patient.  ASA Score: 2     Day of Surgery Review of History & Physical: I have interviewed and examined the patient. I have reviewed the patient's H&P dated:    H&P update referred to the surgeon.         Ready For Surgery From Anesthesia Perspective.

## 2019-08-22 NOTE — TRANSFER OF CARE
Anesthesia Transfer of Care Note    Patient: Delfino Monk    Procedure(s) Performed: Procedure(s) (LRB):  CYSTOSCOPY, WITH URETERAL STENT INSERTION (Left)  PYELOGRAM, RETROGRADE (Left)  URETEROSCOPY (Left)    Patient location: PACU    Anesthesia Type: general    Transport from OR: Transported from OR on room air with adequate spontaneous ventilation    Post pain: adequate analgesia    Post assessment: no apparent anesthetic complications    Post vital signs: stable    Level of consciousness: awake    Nausea/Vomiting: no nausea/vomiting    Complications: none    Transfer of care protocol was followed      Last vitals:   Visit Vitals  /73   Pulse (!) 55   Temp 36.7 °C (98.1 °F) (Temporal)   Resp 18   Ht 6' (1.829 m)   Wt 89.4 kg (197 lb 1.5 oz)   SpO2 95%   BMI 26.73 kg/m²

## 2019-08-22 NOTE — TELEPHONE ENCOUNTER
----- Message from Bradly Green IV, MD sent at 8/22/2019 10:53 AM CDT -----  RTC 2 wks with CT RSS pls

## 2019-08-22 NOTE — INTERVAL H&P NOTE
The patient has been examined and the H&P has been reviewed:    I concur with the findings and no changes have occurred since H&P was written.    Anesthesia/Surgery risks, benefits and alternative options discussed and understood by patient/family.          Active Hospital Problems    Diagnosis  POA    Ureteral stone [N20.1]  Yes      Resolved Hospital Problems   No resolved problems to display.

## 2019-08-22 NOTE — OP NOTE
Date of Procedure: 08/22/2019    PREOPERATIVE DIAGNOSIS:  Left ureteral stone.                                                                                                           POSTOPERATIVE DIAGNOSIS:  Left ureteral stone.                               PROCEDURES:                                                                  1.  Left diagnostic ureteroscopy                         2.  Cystoscopy with placement of left double-J ureteral stent.              3.  Left retrograde pyelogram.                                               4.  Fluoro less than 1 hour.                                                 SURGEON:  Dawit Green IV, M.D.                                          Assistants: None    Specimen: None    Prosthetics, Devices, Grafts: None    ANESTHESIA:  General endotracheal.                                           FINDINGS:  The patient had an approximately 7mm stone in the distal left ureter which was obstructing.  Ureteroscopy was possible after placement of an appropriate guidewire. A 6-German x 28 cm double-J ureteral stent was placed. Flouroscopy was used throughout the case for wire and scope guidance, and if applicable to check final stent placement.                                    BLOOD LOSS:  None.                                                           BLOOD GIVEN:  None.                                                          URINE OUTPUT:  None.                                                         DRAINS:  6-German x 28 cm left double-J ureteral stent.                      PROCEDURE IN DETAIL:  After proper consents were obtained, the patient was brought to the Operating Room where he was prepped and draped in normal sterile fashion and provided general endotracheal anesthesia in dorsal lithotomy position.  A 22-German cystoscope was assembled and introduced per urethra and the bladder was rinsed and drained.  Fluoroscopy was used to evaluate stone position at the  start of the case and throughout the case for wire and scope guidance.    An attempt was made to gently pass a guidewire up the ureter, but it would not pass the stone.  Thus, a 5-Gabonese open-ended catheter with an angle-tipped glidewire was used, and the stone was passed with the wire up to the renal pelvis.  It was a tight, but the 5-Gabonese open-ended catheter was then advanced over the wire up to the renal pelvis.  Retrograde pyelogram was performed to ensure that the wire was inside the left collecting system.  The calices of the kidney were normal, and there were no abnormalities on retrograde pyelogram.  The wire was indeed within the collecting system. Water soluble contrast was used in a 50:50 solution with sterile water.  After retrograde pyelogram, the wire was exchanged using the 5-Gabonese open-ended catheter for the normal guidewire, and then the cystoscope was set aside with the guidewire in place.      The semirigid ureteroscope was then brought to bear and easily passed into the ureteral orifice.  The wire was followed proximally but the scope would not advance more than a few cm proximally.  A second wire was placed and the scope loaded on same; still could not advance.  The flexible ureteroscope was tried over a wire and also could not advance.  Retrograde pyelogram was performed again to assure the scope was in the true lumen as initial wire placement was difficult.  After a period examining the distal ureter and not finding the stone, election was made to place a stent over wire.  Fluoro confirmed excellent position.      The bladder was then rinsed and drained. After the bladder was drained, cystoscope was withdrawn and set aside.  20 Fr soto was placed with 10 ml in the balloon as pt has a history of postoperative retention.  It was sent to gravity after irrigation.    The pt tolerated all of this well, and there were no complications.  he was awakened and transferred to Recovery in stable  condition.

## 2019-08-22 NOTE — ANESTHESIA RELEASE NOTE
Anesthesia Release from PACU Note    Patient: Delfino Monk    Procedure(s) Performed: Procedure(s) (LRB):  CYSTOSCOPY, WITH URETERAL STENT INSERTION (Left)  PYELOGRAM, RETROGRADE (Left)  URETEROSCOPY (Left)    Anesthesia type: general    Post pain: Adequate analgesia    Post assessment: no apparent anesthetic complications    Last Vitals:   Visit Vitals  /73   Pulse (!) 55   Temp 36.7 °C (98.1 °F) (Temporal)   Resp 18   Ht 6' (1.829 m)   Wt 89.4 kg (197 lb 1.5 oz)   SpO2 95%   BMI 26.73 kg/m²       Post vital signs: stable    Level of consciousness: awake    Nausea/Vomiting: no nausea/no vomiting    Complications: none    Airway Patency: patent    Respiratory: unassisted    Cardiovascular: stable and blood pressure at baseline    Hydration: euvolemic

## 2019-08-22 NOTE — DISCHARGE SUMMARY
Date of Discharge: 08/22/2019     Principle Diagnosis: Left ureteral stone    Secondary Diagnosis:  has a past medical history of BPH (benign prostatic hyperplasia), General anesthetics causing adverse effect in therapeutic use, HTN (hypertension), and Kidney stone.    History of Present Illness: Pt was worked up in clinic and today's procedure was scheduled.  Please see H&P for full details.    Hospital Course: Pt presented on the day of surgery and after proper consents were obtained he was brought to the OR where his procedure was performed without difficulty.    Discharge Disposition: Home    Followup Plan:  1. Pt is provided with medications for pain and others as indicated.  2. RTC in 2 weeks with CT RSS; will need further staged procedures to eliminate his stone, anticipate ureteroscopy in 3-4 weeks.

## 2019-08-22 NOTE — PLAN OF CARE
Discharge instructions discussed with patient and father, patient and father verbalized understanding.

## 2019-08-23 VITALS
BODY MASS INDEX: 26.69 KG/M2 | RESPIRATION RATE: 11 BRPM | HEIGHT: 72 IN | SYSTOLIC BLOOD PRESSURE: 145 MMHG | HEART RATE: 78 BPM | OXYGEN SATURATION: 96 % | TEMPERATURE: 98 F | WEIGHT: 197.06 LBS | DIASTOLIC BLOOD PRESSURE: 67 MMHG

## 2019-08-25 ENCOUNTER — HOSPITAL ENCOUNTER (INPATIENT)
Facility: HOSPITAL | Age: 64
LOS: 1 days | Discharge: HOME | DRG: 726 | End: 2019-08-28
Attending: EMERGENCY MEDICINE | Admitting: UROLOGY
Payer: COMMERCIAL

## 2019-08-25 DIAGNOSIS — N40.0 BENIGN PROSTATIC HYPERPLASIA, UNSPECIFIED WHETHER LOWER URINARY TRACT SYMPTOMS PRESENT: Primary | Chronic | ICD-10-CM

## 2019-08-25 DIAGNOSIS — R31.9 HEMATURIA, UNSPECIFIED TYPE: ICD-10-CM

## 2019-08-25 DIAGNOSIS — N20.1 URETERAL STONE: ICD-10-CM

## 2019-08-25 DIAGNOSIS — R33.9 URINARY RETENTION: ICD-10-CM

## 2019-08-25 PROCEDURE — 99285 EMERGENCY DEPT VISIT HI MDM: CPT | Mod: 25,27

## 2019-08-25 PROCEDURE — 51798 US URINE CAPACITY MEASURE: CPT

## 2019-08-25 NOTE — ED PROVIDER NOTES
SCRIBE #1 NOTE: I, Casper Nunes, am scribing for, and in the presence of, Cuco Aguilar Jr., MD. I have scribed the HPI, ROS, and PEx.     SCRIBE #2 NOTE: I, Kailee Pedraza, am scribing for, and in the presence of,  Tamar De La O MD. I have scribed the remaining portions of the note not scribed by Scribe #1.     History      Chief Complaint   Patient presents with    Urinary Retention     Patient seen in ED earlier for clogged soto. Patient states it is clogged up again. Urine not emptying.        Review of patient's allergies indicates:   Allergen Reactions    Augmentin [amoxicillin-pot clavulanate] Rash    Pcn [penicillins] Rash     Note: patient has tolerated both Cefazolin and Ceftriaxone in the past with no reported problems.         HPI   HPI    8/25/2019, 6:10 PM   History obtained from the patient      History of Present Illness: Delfino Monk is a 64 y.o. male patient who presents to the Emergency Department for urinary retention, onset just PTA. Pt presented to the ED this morning for similar sxs and had his Soto catheter irrigated; however pt states that his catheter is clogged again. Pt had a lithotripsy done on 8/22/19. Symptoms are constant and moderate in severity. No mitigating or exacerbating factors reported. Associated sxs include bilateral flank pain and hematuria. Patient denies any fever, chills, n/v/d, SOB, CP, weakness, numbness, dizziness, headache, and all other sxs at this time. No prior Tx reported. No further complaints or concerns at this time.     Arrival mode: Personal vehicle    PCP: Edgard Mendenhall MD       Past Medical History:  Past Medical History:   Diagnosis Date    BPH (benign prostatic hyperplasia)     General anesthetics causing adverse effect in therapeutic use     unable to urinate    HTN (hypertension)     Kidney stone        Past Surgical History:  Past Surgical History:   Procedure Laterality Date    bilateral lap inguinal hernia repair       CHOLECYSTECTOMY  2016    CYSTOSCOPY WITH STENT PLACEMENT Right 12/12/2017    Performed by Bradly Green IV, [facility] at [facility]    CYSTOSCOPY, WITH URETERAL STENT INSERTION Left 8/22/2019    Performed by Bradly Green IV[facility] at [facility]    EXTRACTION-STONE-URETEROSCOPY Right 12/12/2017    Performed by Bradly Green IV[facility] MD at [facility]    HERNIA REPAIR      left ankle      PYELOGRAM, RETROGRADE Left 8/22/2019    Performed by Bradly Green IV, MD at [facility]    REPAIR-HERNIA-INGUINAL RECURRENT Left 9/4/2015    Performed by Luciano Murphy MD at [facility]    TONSILLECTOMY, ADENOIDECTOMY      URETEROSCOPY Left 8/22/2019    Performed by Bradly Green IV, MD at [facility]         Family History:  History reviewed. No pertinent family history.     Social History:  Social History     Tobacco Use    Smoking status: Never Smoker    Smokeless tobacco: Never Used   Substance and Sexual Activity    Alcohol use: No     Alcohol/week: 0.0 oz    Drug use: Never    Sexual activity: Unknown       ROS   Review of Systems   Constitutional: Negative for chills, diaphoresis, fatigue and fever.   HENT: Negative for sore throat.    Respiratory: Negative for shortness of breath.    Cardiovascular: Negative for chest pain.   Gastrointestinal: Negative for diarrhea, nausea and vomiting.   Genitourinary: Positive for difficulty urinating (retention), flank pain and hematuria. Negative for dysuria.   Musculoskeletal: Negative for back pain.   Skin: Negative for rash and wound.   Neurological: Negative for weakness.   Hematological: Does not bruise/bleed easily.   All other systems reviewed and are negative.    Physical Exam      Initial Vitals [08/25/19 1754]   BP Pulse Resp Temp SpO2   (!) 144/79 82 19 97.7 °F (36.5 °C) 97 %      MAP       --          Physical Exam  Nursing Notes and Vital Signs Reviewed.  Constitutional: Patient is in no acute distress. Well-developed and well-nourished.  Patient appears very  uncomfortable  Head: Atraumatic. Normocephalic.  Eyes: PERRL. EOM intact. Conjunctivae are not pale. No scleral icterus.  ENT: Mucous membranes are moist. Oropharynx is clear and symmetric.    Neck: Supple. Full ROM. No lymphadenopathy.  Cardiovascular: Regular rate. Regular rhythm. No murmurs, rubs, or gallops. Distal pulses are 2+ and symmetric.  Pulmonary/Chest: No respiratory distress. Clear to auscultation bilaterally. No wheezing or rales.  Abdominal: Soft and non-distended.  There is no tenderness.  No rebound, guarding, or rigidity. Good bowel sounds.  Genitourinary: No CVA tenderness. Chávez catheter in place.  There is vazquez blood in the Chávez catheter bag.  Scant urine output  Musculoskeletal: Moves all extremities. No obvious deformities. No edema.  Skin: Warm and dry.  Neurological:  Alert, awake, and appropriate.  Normal speech.  No acute focal neurological deficits are appreciated.  Psychiatric: Normal affect. Good eye contact. Appropriate in content.    ED Course    Procedures  ED Vital Signs:  Vitals:    08/26/19 0550 08/26/19 0820 08/26/19 1211 08/26/19 1557   BP: 127/70 126/79 136/69 115/69   Pulse: (!) 53 86 62 71   Resp: 17 20 18 18   Temp:  97.7 °F (36.5 °C) 98 °F (36.7 °C) 97.7 °F (36.5 °C)   TempSrc:  Oral Oral Oral   SpO2: 96% 97% 95% (!) 94%   Weight:       Height:        08/26/19 2011 08/26/19 2107 08/26/19 2109 08/27/19 0039   BP:   (!) 143/79 124/77   Pulse:  (!) 58 (!) 57 (!) 57   Resp:  16 16 17   Temp:  97.4 °F (36.3 °C) 97.8 °F (36.6 °C) 97.9 °F (36.6 °C)   TempSrc:  Oral Oral Oral   SpO2: 99% 96% (!) 94% 95%   Weight:       Height:        08/27/19 0413 08/27/19 0735 08/27/19 1624 08/27/19 2028   BP: 116/64 (!) 155/78 136/72 134/77   Pulse: (!) 56 (!) 57 (!) 50 (!) 56   Resp: 17 16 18 18   Temp: 97.9 °F (36.6 °C) 97.8 °F (36.6 °C) 97.9 °F (36.6 °C) 98.1 °F (36.7 °C)   TempSrc: Oral Oral Oral Oral   SpO2: 95% 95% 96% 95%   Weight:       Height:        08/27/19 2333 08/28/19 0412  08/28/19 0737   BP: 137/68 132/65 136/80   Pulse: 63 (!) 56 (!) 55   Resp: 18 18 16   Temp: 98.5 °F (36.9 °C) 98 °F (36.7 °C) 98 °F (36.7 °C)   TempSrc:   Oral   SpO2: 97% (!) 93% 96%   Weight:      Height:          Abnormal Lab Results:  Labs Reviewed - No data to display     Imaging Results:  Imaging Results    None                 The Emergency Provider reviewed the vital signs and test results, which are outlined above.    ED Discussion     6:14 PM: Discussed pt's case with Dr. Green (Urology), who is very familiar with the patient and recommends doing a bladder scan, irrigating the pt's catheter, and calling Dr. Green again afterwards.    7:49 PM: Contacted Dr. Green (Urology) again after bladder scan and catheter irrigation. Dr. Green recommends irrigating the soto catheter again; if there is no more blood, then the catheter can be discontinued and pt can be discharged home. If blood is still present, Dr. Green recommends admission with a 3 way catheter.    8:00 PM: Dr. Aguilar transfers care of pt to Dr. De La O pending catheter irrigation.    9:28 PM: Dr. De La O evaluated pt. Pt is resting comfortably and is in no acute distress.  D/w pt all pertinent results. D/w pt any concerns expressed at this time. Answered all questions. Pt expresses understanding at this time.    1:10 AM: Discussed case with Dr. Green (Urology). Dr. Green agrees with current care and management of pt and accepts admission.   Admitting Service: Urology  Admitting Physician: Dr. Green  Admit to: Observation    5:31 AM: Re-evaluated pt. I have discussed test results, shared treatment plan, and the need for admission with patient and family at bedside. Pt and family express understanding at this time and agree with all information. All questions answered. Pt and family have no further questions or concerns at this time. Pt is ready for admit.      ED Medication(s):  Medications - No data to display       Discharge  Medication List as of 8/28/2019  2:07 PM            Medical Decision Making    Medical Decision Making:   Clinical Tests:   Lab Tests: Ordered and Reviewed           Scribe Attestation:   Scribe #1: I performed the above scribed service and the documentation accurately describes the services I performed. I attest to the accuracy of the note.    Attending:   Physician Attestation Statement for Scribe #1: I, Cuco Aguilar Jr., MD, personally performed the services described in this documentation, as scribed by Casper Nunes, in my presence, and it is both accurate and complete.       Scribe Attestation:   Scribe #2: I performed the above scribed service and the documentation accurately describes the services I performed. I attest to the accuracy of the note.    Attending Attestation:           Physician Attestation for Scribe:    Physician Attestation Statement for Scribe #2: I, Tamar De La O MD, reviewed documentation, as scribed by Kailee Pedraza in my presence, and it is both accurate and complete. I also acknowledge and confirm the content of the note done by Scribe #1.          Clinical Impression       ICD-10-CM ICD-9-CM   1. Benign prostatic hyperplasia, unspecified whether lower urinary tract symptoms present N40.0 600.00   2. Urinary retention R33.9 788.20   3. Hematuria, unspecified type R31.9 599.70   4. Ureteral stone N20.1 592.1       Disposition:   Disposition: Placed in Observation  Condition: Stable         Tamar De La O MD  08/31/19 1015

## 2019-08-26 PROCEDURE — G0378 HOSPITAL OBSERVATION PER HR: HCPCS

## 2019-08-26 PROCEDURE — 25000003 PHARM REV CODE 250: Performed by: UROLOGY

## 2019-08-26 RX ORDER — OXYCODONE AND ACETAMINOPHEN 5; 325 MG/1; MG/1
1 TABLET ORAL EVERY 6 HOURS PRN
Status: DISCONTINUED | OUTPATIENT
Start: 2019-08-26 | End: 2019-08-28 | Stop reason: HOSPADM

## 2019-08-26 RX ORDER — FINASTERIDE 5 MG/1
5 TABLET, FILM COATED ORAL DAILY
Status: DISCONTINUED | OUTPATIENT
Start: 2019-08-26 | End: 2019-08-28 | Stop reason: HOSPADM

## 2019-08-26 RX ORDER — DOCUSATE SODIUM 100 MG/1
100 CAPSULE, LIQUID FILLED ORAL 2 TIMES DAILY
Status: DISCONTINUED | OUTPATIENT
Start: 2019-08-26 | End: 2019-08-28 | Stop reason: HOSPADM

## 2019-08-26 RX ORDER — LOSARTAN POTASSIUM 50 MG/1
50 TABLET ORAL DAILY
Status: DISCONTINUED | OUTPATIENT
Start: 2019-08-26 | End: 2019-08-28 | Stop reason: HOSPADM

## 2019-08-26 RX ORDER — TAMSULOSIN HYDROCHLORIDE 0.4 MG/1
0.4 CAPSULE ORAL DAILY
Status: DISCONTINUED | OUTPATIENT
Start: 2019-08-26 | End: 2019-08-28 | Stop reason: HOSPADM

## 2019-08-26 RX ADMIN — TAMSULOSIN HYDROCHLORIDE 0.4 MG: 0.4 CAPSULE ORAL at 10:08

## 2019-08-26 RX ADMIN — LOSARTAN POTASSIUM 50 MG: 50 TABLET, FILM COATED ORAL at 10:08

## 2019-08-26 RX ADMIN — OXYCODONE HYDROCHLORIDE AND ACETAMINOPHEN 1 TABLET: 5; 325 TABLET ORAL at 01:08

## 2019-08-26 RX ADMIN — DOCUSATE SODIUM 100 MG: 100 CAPSULE, LIQUID FILLED ORAL at 10:08

## 2019-08-26 RX ADMIN — FINASTERIDE 5 MG: 5 TABLET, FILM COATED ORAL at 10:08

## 2019-08-26 NOTE — H&P
Chief Complaint: BPH/Stones    HPI:   8/26/19: Admitted overnight for gross hematuria now on CBI on floor.  Had left URS last week, was difficult with placement of stent/soto.  Gross hematuria since procedure with clot retention starting POD2/3.  Anxious.  8/15/19: 4d ago went to ER here for left distal ureteral stone, was transferred to [facility] and had a procedure on his stone, sounds like ESWL (3000 shocks).  Has been to the ER since then, PVR was normal on check.  A soto was placed and no urine product and soto was removed.  Has not passed any fragments.  10/1/18: KUB/US 7/18 reassuring.  Voiding fine.  1/29/18: Laws: One month PVR recheck. Feeling good; voiding back to normal. Good stream. Taking Flomax and Finasteride. No gross hematuria. No SP pain or dysuria.   12/27/17: Laws: Doing well since stent removal last week; feels like he is emptying well. Soto has been out a week. No gross hematuria. No pain at all. Taking Flomax and Finasteride. Says he drinks a lot of water and does not eat a high salt diet.   12/18/17: URS complete, stone removed.  Stent out today.  PVR reassuring, voiding trial.   12/6/17: Was traveling and entered retention he thought he was in retention with 800ml and a soto was placed.  Had a CT after that that showed a kidney stone and was given some pain meds but has had no pain since he was at the ER.  Was using the oxybutinin when he entered retention.  Has a 8mm right UVJ stone.  11/20/17: Cysto today shows very large median lobe but also strong evidence of severe OAB with bladder spasms at 150 ml pushing water out around the scope.  11/17/17: Over last 3-4d got worsening stream, urgency, SP tenderness; last night entered retention.  Hardly anything came out when catheter was put in though (maybe 50ml).  Was having a ton of pain before that; can't say if it was back or bladder or brain.  Catheter did relieve the symptoms though.  No constipation daily BM but looser lately.  No  "dysuria.  Taking flomax/finasteride.  9/25/17: No problems at all, Reviewed history in detail.  PSA not done.  Two polyps on colonoscopy were assessed, not worrisome.  7/1/16: No new complaints.  Voiding well.  4/12/16: Alycia: "This 60-year-old male returns to the clinic today because of another episode of urinary retention following gallbladder surgery he had on Friday.  He was catheterized, and has been doing reasonably well with the Soto catheter.  He would prefer to get the catheter out today if that is possible.  My recommendation is to proceed with a voiding trial, and he agrees."  Voided fine  9/9/15: returns after having left IH repair with Dr. Murphy last Friday and entered retention and was discharge with a soto.  Removed today.   7/1/15: No adverse changes - satisfied. PSA approp on finasteride and he is voiding better.  6/25/14: PSA much reduced now on finasteride.  No urinary bother. Since his last visit he is voiding fine and feeling well. Good stream no dribble.  12/20/13: Saw L. Rushing recently with hesitancy nd elevated PVR while taking cold medications. Instructed to hold them.  Here today with PVR 47ml.  Started finasteride and has been on it.  Last May he had retention  Had elevated PSA and after the instrumentation was removed his PSA lowered appropriately.    6/10/13: Last note: "This patient was seen earlier this year for an acute onset of urinary retention secondary to laparoscopic bilateral hernia repair. He had a voiding trial but failed and had to go back to the ER that night for another catheter. Patient was started on Flomax and had no concerns for a while with another voiding trial a week later. His psa levels were elevated and he completed 4 weeks of cipro and was to have another psa level assessed. Patient presented again as a follow-up to an acute onset of urinary retention, where he presented to [facility] and a soto was placed. Patient was given Levaquin and had his soto removed. " "This was his second time this yr with urinary retention. He feels his stream is okay but sometimes weak; occasional splitting. No gross hematuria before the inguinal hernia surgery but has had some associated with catheterization."    Allergies:  Allergies   Allergen Reactions    Pcn [Penicillins]     Augmentin [Amoxicillin-Pot Clavulanate] Rash       Medications:  has a current medication list which includes the following prescription(s): aspirin, ciprofloxacin hcl, diazepam, finasteride, olmesartan, and tamsulosin.    PMH:   has a past medical history of BPH (benign prostatic hyperplasia), General anesthetics causing adverse effect in therapeutic use, HTN (hypertension), and Kidney stone.    PSH:   has a past surgical history that includes bilateral lap inguinal hernia repair; left ankle; TONSILLECTOMY, ADENOIDECTOMY; Hernia repair; Cholecystectomy (2016); Cystoscopy w/ ureteral stent placement (Left, 8/22/2019); Retrograde pyelography (Left, 8/22/2019); and Ureteroscopy (Left, 8/22/2019).    FamHx: family history is not on file.    SocHx:  reports that he has never smoked. He has never used smokeless tobacco. He reports that he does not drink alcohol or use drugs.      Physical Exam:  Vitals:    08/26/19 0820   BP: 126/79   Pulse: 86   Resp: 20   Temp: 97.7 °F (36.5 °C)     General: A&Ox3, no apparent distress, no deformities  Neck: No masses, normal thyroid  Abdomen: Soft, NT, ND; has a large reducible left inguinal hernia  Skin: The skin is warm and dry. No jaundice.  Ext: No c/c/e.  :   8/26/19: CBI running dark pink, irrig approp.  12/17/17: deferred (HANDY 9/17)    Labs/Studies:   Urinalysis performed in clinic, summary: UA normal  PVR 12/17/17: 191 -> voided then 55 ml  PVR 1/29/18: 143 -> voided then 67 ml  Bladder Scan performed in office:     8/15/19: PVR 19 ml.  PSA very normal now, 2.2    6/15: 1.6    9/17: 1.6    9/18: 1.3    Impression/Plan:   1. CT RSS to check stent/soto positions and stone " followup.  2. CBI  3. Hopefully will not need any immediate procedures.

## 2019-08-26 NOTE — PLAN OF CARE
Problem: Adult Inpatient Plan of Care  Goal: Patient-Specific Goal (Individualization)  Outcome: Ongoing (interventions implemented as appropriate)  Patient remains injury free.  No signs or symptoms of distress noted.  Patient denies any pain or discomfort at this time and will continue to be monitored.

## 2019-08-26 NOTE — ED NOTES
Prior to irrigation bladder scan= 46mL. Irrigated pt's bladder with 320ml of sterile water. Urine color is light red post-irrigation. Draining freely into collection bag. PVA= 25mL. MD notified.

## 2019-08-26 NOTE — ED NOTES
Irrigated pt's bladder with 2000ml of sterile water. Dark red blood noted, several clots irrigated. Urine color is light red post-irrigation. Draining freely into collection bag. PVR = 13ml. Dr. Green notified. Repeat bladder irrigation in 1 hour per MD. Dr. Aguilar and primary nurse notified.

## 2019-08-27 PROCEDURE — 11000001 HC ACUTE MED/SURG PRIVATE ROOM

## 2019-08-27 PROCEDURE — 25000003 PHARM REV CODE 250: Performed by: UROLOGY

## 2019-08-27 RX ADMIN — TAMSULOSIN HYDROCHLORIDE 0.4 MG: 0.4 CAPSULE ORAL at 09:08

## 2019-08-27 RX ADMIN — FINASTERIDE 5 MG: 5 TABLET, FILM COATED ORAL at 09:08

## 2019-08-27 RX ADMIN — DOCUSATE SODIUM 100 MG: 100 CAPSULE, LIQUID FILLED ORAL at 09:08

## 2019-08-27 RX ADMIN — OXYCODONE HYDROCHLORIDE AND ACETAMINOPHEN 1 TABLET: 5; 325 TABLET ORAL at 10:08

## 2019-08-27 RX ADMIN — LOSARTAN POTASSIUM 50 MG: 50 TABLET, FILM COATED ORAL at 09:08

## 2019-08-27 RX ADMIN — DOCUSATE SODIUM 100 MG: 100 CAPSULE, LIQUID FILLED ORAL at 08:08

## 2019-08-27 NOTE — PLAN OF CARE
Problem: Adult Inpatient Plan of Care  Goal: Plan of Care Review  Outcome: Ongoing (interventions implemented as appropriate)  Ambulatory. Pain controlled. Free from injury. CBI secured. Aseptic technique maintained. No distress noted. NADN. Call light in reach. Chart check completed.

## 2019-08-27 NOTE — PLAN OF CARE
CM spoke with patient who is awake, alert, and able to make needs known. Patient states that he spoke with the doctor and is in agreement with anticipated discharge plans. Patient states that he stays with his wife and has transportation to and from scheduled follow up appointments. Patient denies using any DME equipment at home or the use of oCM provided a transitional care folder, information on advanced directives, information on pharmacy bedside delivery, and discharge planning begins on admission with contact information for any needs/questions.    D/C Plan: Home   PCP: Dr. Mendenhall  Preferred Pharmacy: [facility] ([place]   Discharge transportation: family  My [facility]: active  Pharmacy Bedside Delivery: yes       08/27/19 1510   Discharge Assessment   Assessment Type Discharge Planning Assessment   Confirmed/corrected address and phone number on facesheet? Yes   Assessment information obtained from? Patient   Expected Length of Stay (days)   (tbd)   Communicated expected length of stay with patient/caregiver yes   Prior to hospitilization cognitive status: Alert/Oriented   Prior to hospitalization functional status: Independent   Current cognitive status: Alert/Oriented   Current Functional Status: Independent   Facility Arrived From: home    Lives With spouse   Able to Return to Prior Arrangements yes   Is patient able to care for self after discharge? Yes   Who are your caregiver(s) and their phone number(s)? Christy (spouse)   Patient's perception of discharge disposition home or selfcare   Patient currently being followed by outpatient case management? No   Patient currently receives any other outside agency services? No   Equipment Currently Used at Home none   Do you have any problems affording any of your prescribed medications? No   Is the patient taking medications as prescribed? yes   Does the patient have transportation home? Yes   Does the patient receive services at the [facility]? No   Discharge  Plan A Home with family   DME Needed Upon Discharge  none   Patient/Family in Agreement with Plan yes

## 2019-08-27 NOTE — PROGRESS NOTES
Chief Complaint: BPH/Stones    HPI:   8/27/19: Hematuria improved, still present; feeling better no pain since yesterday afteroon.  CT shows good position of soto and stent.  CT report says no ureteral stone but a stone next to the stent in distal ureter apparent on images will review with radiologist.  8/26/19: Admitted overnight for gross hematuria now on CBI on floor.  Had left URS last week, was difficult with placement of stent/soto.  Gross hematuria since procedure with clot retention starting POD2/3.  Anxious.  8/15/19: 4d ago went to ER here for left distal ureteral stone, was transferred to [facility] and had a procedure on his stone, sounds like ESWL (3000 shocks).  Has been to the ER since then, PVR was normal on check.  A soto was placed and no urine product and soto was removed.  Has not passed any fragments.  10/1/18: KUB/US 7/18 reassuring.  Voiding fine.  1/29/18: Laws: One month PVR recheck. Feeling good; voiding back to normal. Good stream. Taking Flomax and Finasteride. No gross hematuria. No SP pain or dysuria.   12/27/17: Laws: Doing well since stent removal last week; feels like he is emptying well. Soto has been out a week. No gross hematuria. No pain at all. Taking Flomax and Finasteride. Says he drinks a lot of water and does not eat a high salt diet.   12/18/17: URS complete, stone removed.  Stent out today.  PVR reassuring, voiding trial.   12/6/17: Was traveling and entered retention he thought he was in retention with 800ml and a soto was placed.  Had a CT after that that showed a kidney stone and was given some pain meds but has had no pain since he was at the ER.  Was using the oxybutinin when he entered retention.  Has a 8mm right UVJ stone.  11/20/17: Cysto today shows very large median lobe but also strong evidence of severe OAB with bladder spasms at 150 ml pushing water out around the scope.  11/17/17: Over last 3-4d got worsening stream, urgency, SP tenderness; last night entered  "retention.  Hardly anything came out when catheter was put in though (maybe 50ml).  Was having a ton of pain before that; can't say if it was back or bladder or brain.  Catheter did relieve the symptoms though.  No constipation daily BM but looser lately.  No dysuria.  Taking flomax/finasteride.  9/25/17: No problems at all, Reviewed history in detail.  PSA not done.  Two polyps on colonoscopy were assessed, not worrisome.  7/1/16: No new complaints.  Voiding well.  4/12/16: Alycia: "This 60-year-old male returns to the clinic today because of another episode of urinary retention following gallbladder surgery he had on Friday.  He was catheterized, and has been doing reasonably well with the Soto catheter.  He would prefer to get the catheter out today if that is possible.  My recommendation is to proceed with a voiding trial, and he agrees."  Voided fine  9/9/15: returns after having left IH repair with Dr. Murphy last Friday and entered retention and was discharge with a soto.  Removed today.   7/1/15: No adverse changes - satisfied. PSA approp on finasteride and he is voiding better.  6/25/14: PSA much reduced now on finasteride.  No urinary bother. Since his last visit he is voiding fine and feeling well. Good stream no dribble.  12/20/13: Saw L. Rushing recently with hesitancy nd elevated PVR while taking cold medications. Instructed to hold them.  Here today with PVR 47ml.  Started finasteride and has been on it.  Last May he had retention  Had elevated PSA and after the instrumentation was removed his PSA lowered appropriately.    6/10/13: Last note: "This patient was seen earlier this year for an acute onset of urinary retention secondary to laparoscopic bilateral hernia repair. He had a voiding trial but failed and had to go back to the ER that night for another catheter. Patient was started on Flomax and had no concerns for a while with another voiding trial a week later. His psa levels were elevated and " "he completed 4 weeks of cipro and was to have another psa level assessed. Patient presented again as a follow-up to an acute onset of urinary retention, where he presented to [facility] and a soto was placed. Patient was given Levaquin and had his soto removed. This was his second time this yr with urinary retention. He feels his stream is okay but sometimes weak; occasional splitting. No gross hematuria before the inguinal hernia surgery but has had some associated with catheterization."    Allergies:  Allergies   Allergen Reactions    Pcn [Penicillins]     Augmentin [Amoxicillin-Pot Clavulanate] Rash       Medications:  has a current medication list which includes the following prescription(s): aspirin, ciprofloxacin hcl, diazepam, finasteride, olmesartan, and tamsulosin.    PMH:   has a past medical history of BPH (benign prostatic hyperplasia), General anesthetics causing adverse effect in therapeutic use, HTN (hypertension), and Kidney stone.    PSH:   has a past surgical history that includes bilateral lap inguinal hernia repair; left ankle; TONSILLECTOMY, ADENOIDECTOMY; Hernia repair; Cholecystectomy (2016); Cystoscopy w/ ureteral stent placement (Left, 8/22/2019); Retrograde pyelography (Left, 8/22/2019); and Ureteroscopy (Left, 8/22/2019).    FamHx: family history is not on file.    SocHx:  reports that he has never smoked. He has never used smokeless tobacco. He reports that he does not drink alcohol or use drugs.      Physical Exam:  Vitals:    08/27/19 0735   BP: (!) 155/78   Pulse: (!) 57   Resp: 16   Temp: 97.8 °F (36.6 °C)     General: A&Ox3, no apparent distress, no deformities  Neck: No masses, normal thyroid  Abdomen: Soft, NT, ND; has a large reducible left inguinal hernia  Skin: The skin is warm and dry. No jaundice.  Ext: No c/c/e.  :   8/26/19: CBI running light pink, irrig approp.  12/17/17: deferred (HANDY 9/17)    Labs/Studies:   Urinalysis performed in clinic, summary: UA normal  PVR " 12/17/17: 191 -> voided then 55 ml  PVR 1/29/18: 143 -> voided then 67 ml  Bladder Scan performed in office:     8/15/19: PVR 19 ml.  PSA very normal now, 2.2    6/15: 1.6    9/17: 1.6    9/18: 1.3    Impression/Plan:   1. Change status to inpatient, will remain another night on CBI for hematuria not resolved.  2. CBI  3. Hopefully will not need any immediate procedures.

## 2019-08-28 VITALS
WEIGHT: 200.94 LBS | BODY MASS INDEX: 27.21 KG/M2 | HEART RATE: 55 BPM | RESPIRATION RATE: 16 BRPM | OXYGEN SATURATION: 96 % | HEIGHT: 72 IN | DIASTOLIC BLOOD PRESSURE: 80 MMHG | TEMPERATURE: 98 F | SYSTOLIC BLOOD PRESSURE: 136 MMHG

## 2019-08-28 PROCEDURE — 25000003 PHARM REV CODE 250: Performed by: UROLOGY

## 2019-08-28 RX ADMIN — TAMSULOSIN HYDROCHLORIDE 0.4 MG: 0.4 CAPSULE ORAL at 08:08

## 2019-08-28 RX ADMIN — FINASTERIDE 5 MG: 5 TABLET, FILM COATED ORAL at 08:08

## 2019-08-28 RX ADMIN — DOCUSATE SODIUM 100 MG: 100 CAPSULE, LIQUID FILLED ORAL at 08:08

## 2019-08-28 RX ADMIN — LOSARTAN POTASSIUM 50 MG: 50 TABLET, FILM COATED ORAL at 08:08

## 2019-08-28 NOTE — PLAN OF CARE
Problem: Adult Inpatient Plan of Care  Goal: Plan of Care Review  Outcome: Ongoing (interventions implemented as appropriate)  Due to void. Ambulatory. Aseptic technique maintained. Chávez removed. Free from injury. Pain controlled. Call light in reach. Chart check completed.

## 2019-08-28 NOTE — DISCHARGE SUMMARY
Discharge Summary  Urology    Admit Date: 8/25/2019    Discharge Date: 08/28/2019    Discharge Attending Physician: Bradly Green IV, MD     Diagnoses:  Active Hospital Problems    Diagnosis  POA    *Urinary retention [R33.9]  Yes      Resolved Hospital Problems   No resolved problems to display.       Hospital Course: This patient was seen and examined on the date of discharge and determined to be suitable for discharge.  Was started on CBI and over time the hematuria abated.  Voiding normally at d/c with sig less hematuria.    Consults: None    Pertinent Diagnostic Studies and Procedures:  See chart.      Discharged Condition: good    Disposition: Home or Self Care    Follow-up Plans:      Recent Labs:  Recent Results (from the past 336 hour(s))   CBC auto differential    Collection Time: 08/25/19  9:25 AM   Result Value Ref Range    WBC 9.44 3.90 - 12.70 K/uL    Hemoglobin 13.4 (L) 14.0 - 18.0 g/dL    Hematocrit 38.5 (L) 40.0 - 54.0 %    Platelets 205 150 - 350 K/uL   CBC auto differential    Collection Time: 08/18/19  8:38 AM   Result Value Ref Range    WBC 8.97 3.90 - 12.70 K/uL    Hemoglobin 15.3 14.0 - 18.0 g/dL    Hematocrit 42.6 40.0 - 54.0 %    Platelets 204 150 - 350 K/uL   CBC auto differential    Collection Time: 08/15/19  1:29 AM   Result Value Ref Range    WBC 9.11 3.90 - 12.70 K/uL    Hemoglobin 15.7 14.0 - 18.0 g/dL    Hematocrit 44.0 40.0 - 54.0 %    Platelets 194 150 - 350 K/uL     Recent Results (from the past 336 hour(s))   Basic metabolic panel    Collection Time: 08/21/19  8:34 AM   Result Value Ref Range    Sodium 139 136 - 145 mmol/L    Potassium 4.3 3.5 - 5.1 mmol/L    Chloride 107 95 - 110 mmol/L    CO2 20 (L) 23 - 29 mmol/L    BUN, Bld 17 8 - 23 mg/dL    Creatinine 1.0 0.5 - 1.4 mg/dL    Calcium 9.5 8.7 - 10.5 mg/dL    Anion Gap 12 8 - 16 mmol/L     No results found for: HGBA1C    Discharge Medication List:   Delfino Monk   Home Medication Instructions DENISHA:[id number]     Printed on:08/28/19 4785   Medication Information                      aspirin (ECOTRIN) 81 MG EC tablet  Take 81 mg by mouth once daily.             diazePAM (VALIUM) 5 MG tablet  Take 1 tablet (5 mg total) by mouth every 8 (eight) hours as needed for Anxiety.             docusate sodium (COLACE) 100 MG capsule  Take 1 capsule (100 mg total) by mouth 2 (two) times daily.             finasteride (PROSCAR) 5 mg tablet  Take 1 tablet (5 mg total) by mouth once daily.             olmesartan (BENICAR) 20 MG tablet  Take 20 mg by mouth every evening.              oxybutynin (DITROPAN) 5 MG Tab  Take 1 tablet (5 mg total) by mouth 3 (three) times daily.             oxyCODONE-acetaminophen (PERCOCET) 5-325 mg per tablet  Take 1-2 tablets by mouth every 4 (four) hours as needed for Pain.             tamsulosin (FLOMAX) 0.4 mg Cap  Take 1 capsule (0.4 mg total) by mouth once daily.                   Discharge Procedure Orders   Diet Adult Regular     Notify your health care provider if you experience any of the following:  temperature >100.4     Notify your health care provider if you experience any of the following:  persistent nausea and vomiting or diarrhea     Notify your health care provider if you experience any of the following:  severe uncontrolled pain     Notify your health care provider if you experience any of the following:  redness, tenderness, or signs of infection (pain, swelling, redness, odor or green/yellow discharge around incision site)     Activity as tolerated       An excess of 30 minutes was spent discharging this patient.

## 2019-08-28 NOTE — PLAN OF CARE
Problem: Adult Inpatient Plan of Care  Goal: Plan of Care Review  Outcome: Ongoing (interventions implemented as appropriate)  Patient remained free from injury. Bladder irrigation is use. Denies pain. No s/s of acute distress. 12hr chart check complete.

## 2019-08-29 NOTE — PLAN OF CARE
08/29/19 0958   Final Note   Assessment Type Final Discharge Note   Anticipated Discharge Disposition Home   Hospital Follow Up  Appt(s) scheduled? Yes   Discharge plans and expectations educations in teach back method with documentation complete? Yes

## 2019-08-30 ENCOUNTER — TELEPHONE (OUTPATIENT)
Dept: UROLOGY | Facility: CLINIC | Age: 64
End: 2019-08-30

## 2019-08-30 NOTE — TELEPHONE ENCOUNTER
Pt called to ask if Dr. Green intended for him to be on Cipro. Per Dr. Green pt is to complete current course and continue to hold the ASA. Pt verbalized understanding.

## 2019-09-05 ENCOUNTER — HOSPITAL ENCOUNTER (OUTPATIENT)
Dept: RADIOLOGY | Facility: HOSPITAL | Age: 64
Discharge: HOME | End: 2019-09-05
Attending: UROLOGY
Payer: COMMERCIAL

## 2019-09-05 DIAGNOSIS — N20.1 URETERAL STONE: ICD-10-CM

## 2019-09-05 PROCEDURE — 74176 CT RENAL STONE STUDY ABD PELVIS WO: ICD-10-PCS | Mod: 26,,, | Performed by: RADIOLOGY

## 2019-09-05 PROCEDURE — 74176 CT ABD & PELVIS W/O CONTRAST: CPT | Mod: 26,,, | Performed by: RADIOLOGY

## 2019-09-05 PROCEDURE — 74176 CT ABD & PELVIS W/O CONTRAST: CPT | Mod: TC,PO

## 2019-09-11 ENCOUNTER — OFFICE VISIT (OUTPATIENT)
Dept: UROLOGY | Facility: CLINIC | Age: 64
End: 2019-09-11
Payer: COMMERCIAL

## 2019-09-11 VITALS
SYSTOLIC BLOOD PRESSURE: 126 MMHG | WEIGHT: 195.13 LBS | DIASTOLIC BLOOD PRESSURE: 82 MMHG | HEIGHT: 72 IN | BODY MASS INDEX: 26.43 KG/M2

## 2019-09-11 DIAGNOSIS — N20.1 URETERAL STONE: Primary | ICD-10-CM

## 2019-09-11 LAB
BILIRUB SERPL-MCNC: NORMAL MG/DL
BLOOD URINE, POC: 250
COLOR, POC UA: YELLOW
GLUCOSE UR QL STRIP: NORMAL
KETONES UR QL STRIP: NORMAL
LEUKOCYTE ESTERASE URINE, POC: NORMAL
NITRITE, POC UA: NORMAL
PH, POC UA: 6
PROTEIN, POC: 100
SPECIFIC GRAVITY, POC UA: 1.01
UROBILINOGEN, POC UA: NORMAL

## 2019-09-11 PROCEDURE — 99024 POSTOP FOLLOW-UP VISIT: CPT | Mod: S$GLB,,, | Performed by: UROLOGY

## 2019-09-11 PROCEDURE — 81002 POCT URINE DIPSTICK WITHOUT MICROSCOPE: ICD-10-PCS | Mod: S$GLB,,, | Performed by: UROLOGY

## 2019-09-11 PROCEDURE — 99024 PR POST-OP FOLLOW-UP VISIT: ICD-10-PCS | Mod: S$GLB,,, | Performed by: UROLOGY

## 2019-09-11 PROCEDURE — 81002 URINALYSIS NONAUTO W/O SCOPE: CPT | Mod: S$GLB,,, | Performed by: UROLOGY

## 2019-09-11 PROCEDURE — 99999 PR PBB SHADOW E&M-EST. PATIENT-LVL III: CPT | Mod: PBBFAC,,, | Performed by: UROLOGY

## 2019-09-11 PROCEDURE — 99999 PR PBB SHADOW E&M-EST. PATIENT-LVL III: ICD-10-PCS | Mod: PBBFAC,,, | Performed by: UROLOGY

## 2019-09-11 NOTE — H&P (VIEW-ONLY)
Chief Complaint: BPH/Stones    HPI:   9/11/19: Had left URS and the stone could not be observed, has a stent since.  Reviewed history in detail.  Difficult case it seems the stone is present on CT but was not observed at URS.  Hopefully access will be easier after the stent has been there; unclear if possibly extrinsic to ureter.  8/22/19: Left URS no stone observed in the ureter  8/15/19: 4d ago went to ER here for left distal ureteral stone, was transferred to [facility] and had a procedure on his stone, sounds like ESWL (3000 shocks).  Has been to the ER since then, PVR was normal on check.  A soto was placed and no urine product and soto was removed.  Has not passed any fragments.  10/1/18: KUB/US 7/18 reassuring.  Voiding fine.  1/29/18: Laws: One month PVR recheck. Feeling good; voiding back to normal. Good stream. Taking Flomax and Finasteride. No gross hematuria. No SP pain or dysuria.   12/27/17: Laws: Doing well since stent removal last week; feels like he is emptying well. Soto has been out a week. No gross hematuria. No pain at all. Taking Flomax and Finasteride. Says he drinks a lot of water and does not eat a high salt diet.   12/18/17: URS complete, stone removed.  Stent out today.  PVR reassuring, voiding trial.   12/6/17: Was traveling and entered retention he thought he was in retention with 800ml and a soto was placed.  Had a CT after that that showed a kidney stone and was given some pain meds but has had no pain since he was at the ER.  Was using the oxybutinin when he entered retention.  Has a 8mm right UVJ stone.  11/20/17: Cysto today shows very large median lobe but also strong evidence of severe OAB with bladder spasms at 150 ml pushing water out around the scope.  11/17/17: Over last 3-4d got worsening stream, urgency, SP tenderness; last night entered retention.  Hardly anything came out when catheter was put in though (maybe 50ml).  Was having a ton of pain before that; can't say if it was  "back or bladder or brain.  Catheter did relieve the symptoms though.  No constipation daily BM but looser lately.  No dysuria.  Taking flomax/finasteride.  9/25/17: No problems at all, Reviewed history in detail.  PSA not done.  Two polyps on colonoscopy were assessed, not worrisome.  7/1/16: No new complaints.  Voiding well.  4/12/16: Alycia: "This 60-year-old male returns to the clinic today because of another episode of urinary retention following gallbladder surgery he had on Friday.  He was catheterized, and has been doing reasonably well with the Soto catheter.  He would prefer to get the catheter out today if that is possible.  My recommendation is to proceed with a voiding trial, and he agrees."  Voided fine  9/9/15: returns after having left IH repair with Dr. Murphy last Friday and entered retention and was discharge with a soto.  Removed today.   7/1/15: No adverse changes - satisfied. PSA approp on finasteride and he is voiding better.  6/25/14: PSA much reduced now on finasteride.  No urinary bother. Since his last visit he is voiding fine and feeling well. Good stream no dribble.  12/20/13: Saw L. Rushing recently with hesitancy nd elevated PVR while taking cold medications. Instructed to hold them.  Here today with PVR 47ml.  Started finasteride and has been on it.  Last May he had retention  Had elevated PSA and after the instrumentation was removed his PSA lowered appropriately.    6/10/13: Last note: "This patient was seen earlier this year for an acute onset of urinary retention secondary to laparoscopic bilateral hernia repair. He had a voiding trial but failed and had to go back to the ER that night for another catheter. Patient was started on Flomax and had no concerns for a while with another voiding trial a week later. His psa levels were elevated and he completed 4 weeks of cipro and was to have another psa level assessed. Patient presented again as a follow-up to an acute onset of urinary " "retention, where he presented to [facility] [address] and a soto was placed. Patient was given Levaquin and had his soto removed. This was his second time this yr with urinary retention. He feels his stream is okay but sometimes weak; occasional splitting. No gross hematuria before the inguinal hernia surgery but has had some associated with catheterization."    Allergies:  Allergies   Allergen Reactions    Pcn [Penicillins]     Augmentin [Amoxicillin-Pot Clavulanate] Rash       Medications:  has a current medication list which includes the following prescription(s): diazepam, docusate sodium, finasteride, olmesartan, oxybutynin, oxycodone-acetaminophen, tamsulosin, and aspirin.    PMH:   has a past medical history of BPH (benign prostatic hyperplasia), General anesthetics causing adverse effect in therapeutic use, HTN (hypertension), and Kidney stone.    PSH:   has a past surgical history that includes bilateral lap inguinal hernia repair; left ankle; TONSILLECTOMY, ADENOIDECTOMY; Hernia repair; Cholecystectomy (2016); Cystoscopy w/ ureteral stent placement (Left, 8/22/2019); Retrograde pyelography (Left, 8/22/2019); and Ureteroscopy (Left, 8/22/2019).    FamHx: family history is not on file.    SocHx:  reports that he has never smoked. He has never used smokeless tobacco. He reports that he does not drink alcohol or use drugs.      Physical Exam:  Vitals:    09/11/19 1354   BP: 126/82     General: A&Ox3, no apparent distress, no deformities  Neck: No masses, normal thyroid  Abdomen: Soft, NT, ND; has a large reducible left inguinal hernia  Skin: The skin is warm and dry. No jaundice.  Ext: No c/c/e.  :   12/17/17: deferred (HANDY 9/17)    Labs/Studies:   Urinalysis performed in clinic, summary: UA normal  PVR 12/17/17: 191 -> voided then 55 ml  PVR 1/29/18: 143 -> voided then 67 ml  Bladder Scan performed in office:     8/15/19: PVR 19 ml.  PSA very normal now, 2.2    6/15: 1.6    9/17: 1.6    9/18: 1.3    Impression/Plan: "   1. Staged left URS seems reasonable to evaluate more of the ureter after stent has been there a month.  Risks/benefits reviewed consents on chart.  Will need a soto to go home.

## 2019-09-11 NOTE — PROGRESS NOTES
Chief Complaint: BPH/Stones    HPI:   9/11/19: Had left URS and the stone could not be observed, has a stent since.  Reviewed history in detail.  Difficult case it seems the stone is present on CT but was not observed at URS.  Hopefully access will be easier after the stent has been there; unclear if possibly extrinsic to ureter.  8/22/19: Left URS no stone observed in the ureter  8/15/19: 4d ago went to ER here for left distal ureteral stone, was transferred to [facility] and had a procedure on his stone, sounds like ESWL (3000 shocks).  Has been to the ER since then, PVR was normal on check.  A soto was placed and no urine product and soto was removed.  Has not passed any fragments.  10/1/18: KUB/US 7/18 reassuring.  Voiding fine.  1/29/18: Laws: One month PVR recheck. Feeling good; voiding back to normal. Good stream. Taking Flomax and Finasteride. No gross hematuria. No SP pain or dysuria.   12/27/17: Laws: Doing well since stent removal last week; feels like he is emptying well. Soto has been out a week. No gross hematuria. No pain at all. Taking Flomax and Finasteride. Says he drinks a lot of water and does not eat a high salt diet.   12/18/17: URS complete, stone removed.  Stent out today.  PVR reassuring, voiding trial.   12/6/17: Was traveling and entered retention he thought he was in retention with 800ml and a soto was placed.  Had a CT after that that showed a kidney stone and was given some pain meds but has had no pain since he was at the ER.  Was using the oxybutinin when he entered retention.  Has a 8mm right UVJ stone.  11/20/17: Cysto today shows very large median lobe but also strong evidence of severe OAB with bladder spasms at 150 ml pushing water out around the scope.  11/17/17: Over last 3-4d got worsening stream, urgency, SP tenderness; last night entered retention.  Hardly anything came out when catheter was put in though (maybe 50ml).  Was having a ton of pain before that; can't say if it was  "back or bladder or brain.  Catheter did relieve the symptoms though.  No constipation daily BM but looser lately.  No dysuria.  Taking flomax/finasteride.  9/25/17: No problems at all, Reviewed history in detail.  PSA not done.  Two polyps on colonoscopy were assessed, not worrisome.  7/1/16: No new complaints.  Voiding well.  4/12/16: Alycia: "This 60-year-old male returns to the clinic today because of another episode of urinary retention following gallbladder surgery he had on Friday.  He was catheterized, and has been doing reasonably well with the Soto catheter.  He would prefer to get the catheter out today if that is possible.  My recommendation is to proceed with a voiding trial, and he agrees."  Voided fine  9/9/15: returns after having left IH repair with Dr. Murphy last Friday and entered retention and was discharge with a soto.  Removed today.   7/1/15: No adverse changes - satisfied. PSA approp on finasteride and he is voiding better.  6/25/14: PSA much reduced now on finasteride.  No urinary bother. Since his last visit he is voiding fine and feeling well. Good stream no dribble.  12/20/13: Saw L. Rushing recently with hesitancy nd elevated PVR while taking cold medications. Instructed to hold them.  Here today with PVR 47ml.  Started finasteride and has been on it.  Last May he had retention  Had elevated PSA and after the instrumentation was removed his PSA lowered appropriately.    6/10/13: Last note: "This patient was seen earlier this year for an acute onset of urinary retention secondary to laparoscopic bilateral hernia repair. He had a voiding trial but failed and had to go back to the ER that night for another catheter. Patient was started on Flomax and had no concerns for a while with another voiding trial a week later. His psa levels were elevated and he completed 4 weeks of cipro and was to have another psa level assessed. Patient presented again as a follow-up to an acute onset of urinary " "retention, where he presented to [facility] and a soto was placed. Patient was given Levaquin and had his soto removed. This was his second time this yr with urinary retention. He feels his stream is okay but sometimes weak; occasional splitting. No gross hematuria before the inguinal hernia surgery but has had some associated with catheterization."    Allergies:  Allergies   Allergen Reactions    Pcn [Penicillins]     Augmentin [Amoxicillin-Pot Clavulanate] Rash       Medications:  has a current medication list which includes the following prescription(s): diazepam, docusate sodium, finasteride, olmesartan, oxybutynin, oxycodone-acetaminophen, tamsulosin, and aspirin.    PMH:   has a past medical history of BPH (benign prostatic hyperplasia), General anesthetics causing adverse effect in therapeutic use, HTN (hypertension), and Kidney stone.    PSH:   has a past surgical history that includes bilateral lap inguinal hernia repair; left ankle; TONSILLECTOMY, ADENOIDECTOMY; Hernia repair; Cholecystectomy (2016); Cystoscopy w/ ureteral stent placement (Left, 8/22/2019); Retrograde pyelography (Left, 8/22/2019); and Ureteroscopy (Left, 8/22/2019).    FamHx: family history is not on file.    SocHx:  reports that he has never smoked. He has never used smokeless tobacco. He reports that he does not drink alcohol or use drugs.      Physical Exam:  Vitals:    09/11/19 1354   BP: 126/82     General: A&Ox3, no apparent distress, no deformities  Neck: No masses, normal thyroid  Abdomen: Soft, NT, ND; has a large reducible left inguinal hernia  Skin: The skin is warm and dry. No jaundice.  Ext: No c/c/e.  :   12/17/17: deferred (HANDY 9/17)    Labs/Studies:   Urinalysis performed in clinic, summary: UA normal  PVR 12/17/17: 191 -> voided then 55 ml  PVR 1/29/18: 143 -> voided then 67 ml  Bladder Scan performed in office:     8/15/19: PVR 19 ml.  PSA very normal now, 2.2    6/15: 1.6    9/17: 1.6    9/18: 1.3    Impression/Plan: "   1. Staged left URS seems reasonable to evaluate more of the ureter after stent has been there a month.  Risks/benefits reviewed consents on chart.  Will need a soto to go home.

## 2019-09-17 RX ORDER — ACETAMINOPHEN 500 MG
500 TABLET ORAL EVERY 6 HOURS PRN
COMMUNITY

## 2019-09-23 ENCOUNTER — TELEPHONE (OUTPATIENT)
Dept: UROLOGY | Facility: CLINIC | Age: 64
End: 2019-09-23

## 2019-09-24 ENCOUNTER — ANESTHESIA (OUTPATIENT)
Dept: SURGERY | Facility: HOSPITAL | Age: 64
End: 2019-09-24
Payer: COMMERCIAL

## 2019-09-24 ENCOUNTER — HOSPITAL ENCOUNTER (OUTPATIENT)
Facility: HOSPITAL | Age: 64
Discharge: HOME | End: 2019-09-24
Attending: UROLOGY | Admitting: UROLOGY
Payer: COMMERCIAL

## 2019-09-24 ENCOUNTER — TELEPHONE (OUTPATIENT)
Dept: UROLOGY | Facility: CLINIC | Age: 64
End: 2019-09-24

## 2019-09-24 ENCOUNTER — ANESTHESIA EVENT (OUTPATIENT)
Dept: SURGERY | Facility: HOSPITAL | Age: 64
End: 2019-09-24
Payer: COMMERCIAL

## 2019-09-24 DIAGNOSIS — N20.1 URETERAL STONE: ICD-10-CM

## 2019-09-24 PROCEDURE — 88300 SURGICAL PATH GROSS: CPT | Mod: 26,,, | Performed by: PATHOLOGY

## 2019-09-24 PROCEDURE — 25000003 PHARM REV CODE 250: Performed by: NURSE ANESTHETIST, CERTIFIED REGISTERED

## 2019-09-24 PROCEDURE — 71000033 HC RECOVERY, INTIAL HOUR: Performed by: UROLOGY

## 2019-09-24 PROCEDURE — 82365 CALCULUS SPECTROSCOPY: CPT

## 2019-09-24 PROCEDURE — 88300 TISSUE SPECIMEN TO PATHOLOGY - SURGERY: ICD-10-PCS | Mod: 26,,, | Performed by: PATHOLOGY

## 2019-09-24 PROCEDURE — 37000009 HC ANESTHESIA EA ADD 15 MINS: Performed by: UROLOGY

## 2019-09-24 PROCEDURE — 63600175 PHARM REV CODE 636 W HCPCS: Performed by: NURSE ANESTHETIST, CERTIFIED REGISTERED

## 2019-09-24 PROCEDURE — 37000008 HC ANESTHESIA 1ST 15 MINUTES: Performed by: UROLOGY

## 2019-09-24 PROCEDURE — 27201423 OPTIME MED/SURG SUP & DEVICES STERILE SUPPLY: Performed by: UROLOGY

## 2019-09-24 PROCEDURE — 36000707: Performed by: UROLOGY

## 2019-09-24 PROCEDURE — 88300 SURGICAL PATH GROSS: CPT | Performed by: PATHOLOGY

## 2019-09-24 PROCEDURE — 52356 PR CYSTO/URETERO W/LITHOTRIPSY: ICD-10-PCS | Mod: LT,,, | Performed by: UROLOGY

## 2019-09-24 PROCEDURE — 25000003 PHARM REV CODE 250: Performed by: UROLOGY

## 2019-09-24 PROCEDURE — 71000015 HC POSTOP RECOV 1ST HR: Performed by: UROLOGY

## 2019-09-24 PROCEDURE — 63600175 PHARM REV CODE 636 W HCPCS: Performed by: UROLOGY

## 2019-09-24 PROCEDURE — 52356 CYSTO/URETERO W/LITHOTRIPSY: CPT | Mod: LT,,, | Performed by: UROLOGY

## 2019-09-24 PROCEDURE — 36000706: Performed by: UROLOGY

## 2019-09-24 PROCEDURE — C1758 CATHETER, URETERAL: HCPCS | Performed by: UROLOGY

## 2019-09-24 PROCEDURE — C2617 STENT, NON-COR, TEM W/O DEL: HCPCS | Performed by: UROLOGY

## 2019-09-24 PROCEDURE — C1769 GUIDE WIRE: HCPCS | Performed by: UROLOGY

## 2019-09-24 DEVICE — STENT URETERAL UNIV 6FR 28CM: Type: IMPLANTABLE DEVICE | Site: URETER | Status: FUNCTIONAL

## 2019-09-24 RX ORDER — CEFAZOLIN SODIUM 2 G/50ML
2 SOLUTION INTRAVENOUS
Status: DISCONTINUED | OUTPATIENT
Start: 2019-09-24 | End: 2019-09-24 | Stop reason: HOSPADM

## 2019-09-24 RX ORDER — ONDANSETRON 2 MG/ML
4 INJECTION INTRAMUSCULAR; INTRAVENOUS DAILY PRN
Status: DISCONTINUED | OUTPATIENT
Start: 2019-09-24 | End: 2019-09-24 | Stop reason: HOSPADM

## 2019-09-24 RX ORDER — HYDROCODONE BITARTRATE AND ACETAMINOPHEN 5; 325 MG/1; MG/1
1 TABLET ORAL EVERY 4 HOURS PRN
Status: DISCONTINUED | OUTPATIENT
Start: 2019-09-24 | End: 2019-09-24 | Stop reason: HOSPADM

## 2019-09-24 RX ORDER — NEOSTIGMINE METHYLSULFATE 1 MG/ML
INJECTION, SOLUTION INTRAVENOUS
Status: DISCONTINUED | OUTPATIENT
Start: 2019-09-24 | End: 2019-09-24

## 2019-09-24 RX ORDER — FENTANYL CITRATE 50 UG/ML
INJECTION, SOLUTION INTRAMUSCULAR; INTRAVENOUS
Status: DISCONTINUED | OUTPATIENT
Start: 2019-09-24 | End: 2019-09-24

## 2019-09-24 RX ORDER — OXYCODONE AND ACETAMINOPHEN 5; 325 MG/1; MG/1
1-2 TABLET ORAL EVERY 4 HOURS PRN
Qty: 30 TABLET | Refills: 0 | Status: SHIPPED | OUTPATIENT
Start: 2019-09-24

## 2019-09-24 RX ORDER — LIDOCAINE HYDROCHLORIDE 10 MG/ML
INJECTION, SOLUTION EPIDURAL; INFILTRATION; INTRACAUDAL; PERINEURAL
Status: DISCONTINUED | OUTPATIENT
Start: 2019-09-24 | End: 2019-09-24

## 2019-09-24 RX ORDER — DEXAMETHASONE SODIUM PHOSPHATE 4 MG/ML
INJECTION, SOLUTION INTRA-ARTICULAR; INTRALESIONAL; INTRAMUSCULAR; INTRAVENOUS; SOFT TISSUE
Status: DISCONTINUED | OUTPATIENT
Start: 2019-09-24 | End: 2019-09-24

## 2019-09-24 RX ORDER — PROPOFOL 10 MG/ML
VIAL (ML) INTRAVENOUS
Status: DISCONTINUED | OUTPATIENT
Start: 2019-09-24 | End: 2019-09-24

## 2019-09-24 RX ORDER — FENTANYL CITRATE 50 UG/ML
25 INJECTION, SOLUTION INTRAMUSCULAR; INTRAVENOUS EVERY 5 MIN PRN
Status: DISCONTINUED | OUTPATIENT
Start: 2019-09-24 | End: 2019-09-24 | Stop reason: HOSPADM

## 2019-09-24 RX ORDER — MIDAZOLAM HYDROCHLORIDE 1 MG/ML
INJECTION, SOLUTION INTRAMUSCULAR; INTRAVENOUS
Status: DISCONTINUED | OUTPATIENT
Start: 2019-09-24 | End: 2019-09-24

## 2019-09-24 RX ORDER — HYDROCODONE BITARTRATE AND ACETAMINOPHEN 10; 325 MG/1; MG/1
1 TABLET ORAL EVERY 4 HOURS PRN
Status: DISCONTINUED | OUTPATIENT
Start: 2019-09-24 | End: 2019-09-24 | Stop reason: HOSPADM

## 2019-09-24 RX ORDER — ROCURONIUM BROMIDE 10 MG/ML
INJECTION, SOLUTION INTRAVENOUS
Status: DISCONTINUED | OUTPATIENT
Start: 2019-09-24 | End: 2019-09-24

## 2019-09-24 RX ORDER — CIPROFLOXACIN 500 MG/1
500 TABLET ORAL EVERY 12 HOURS
Qty: 6 TABLET | Refills: 0 | Status: SHIPPED | OUTPATIENT
Start: 2019-09-24 | End: 2019-09-27

## 2019-09-24 RX ORDER — GLYCOPYRROLATE 0.2 MG/ML
INJECTION INTRAMUSCULAR; INTRAVENOUS
Status: DISCONTINUED | OUTPATIENT
Start: 2019-09-24 | End: 2019-09-24

## 2019-09-24 RX ORDER — SODIUM CHLORIDE, SODIUM LACTATE, POTASSIUM CHLORIDE, CALCIUM CHLORIDE 600; 310; 30; 20 MG/100ML; MG/100ML; MG/100ML; MG/100ML
INJECTION, SOLUTION INTRAVENOUS CONTINUOUS PRN
Status: DISCONTINUED | OUTPATIENT
Start: 2019-09-24 | End: 2019-09-24

## 2019-09-24 RX ORDER — FUROSEMIDE 10 MG/ML
INJECTION INTRAMUSCULAR; INTRAVENOUS
Status: DISCONTINUED | OUTPATIENT
Start: 2019-09-24 | End: 2019-09-24

## 2019-09-24 RX ADMIN — CEFAZOLIN SODIUM 2 G: 2 SOLUTION INTRAVENOUS at 07:09

## 2019-09-24 RX ADMIN — FENTANYL CITRATE 100 MCG: 50 INJECTION, SOLUTION INTRAMUSCULAR; INTRAVENOUS at 06:09

## 2019-09-24 RX ADMIN — DEXAMETHASONE SODIUM PHOSPHATE 8 MG: 4 INJECTION, SOLUTION INTRA-ARTICULAR; INTRALESIONAL; INTRAMUSCULAR; INTRAVENOUS; SOFT TISSUE at 07:09

## 2019-09-24 RX ADMIN — ROBINUL 0.6 MG: 0.2 INJECTION INTRAMUSCULAR; INTRAVENOUS at 08:09

## 2019-09-24 RX ADMIN — ROCURONIUM BROMIDE 40 MG: 10 INJECTION, SOLUTION INTRAVENOUS at 07:09

## 2019-09-24 RX ADMIN — ROCURONIUM BROMIDE 10 MG: 10 INJECTION, SOLUTION INTRAVENOUS at 07:09

## 2019-09-24 RX ADMIN — FENTANYL CITRATE 50 MCG: 50 INJECTION, SOLUTION INTRAMUSCULAR; INTRAVENOUS at 07:09

## 2019-09-24 RX ADMIN — SODIUM CHLORIDE, SODIUM LACTATE, POTASSIUM CHLORIDE, AND CALCIUM CHLORIDE: 600; 310; 30; 20 INJECTION, SOLUTION INTRAVENOUS at 06:09

## 2019-09-24 RX ADMIN — NEOSTIGMINE METHYLSULFATE 4 MG: 1 INJECTION INTRAVENOUS at 08:09

## 2019-09-24 RX ADMIN — FUROSEMIDE 10 MG: 10 INJECTION, SOLUTION INTRAMUSCULAR; INTRAVENOUS at 07:09

## 2019-09-24 RX ADMIN — PROPOFOL 50 MG: 10 INJECTION, EMULSION INTRAVENOUS at 07:09

## 2019-09-24 RX ADMIN — HYDROCODONE BITARTRATE AND ACETAMINOPHEN 1 TABLET: 10; 325 TABLET ORAL at 08:09

## 2019-09-24 RX ADMIN — PROPOFOL 150 MG: 10 INJECTION, EMULSION INTRAVENOUS at 07:09

## 2019-09-24 RX ADMIN — MIDAZOLAM 2 MG: 1 INJECTION INTRAMUSCULAR; INTRAVENOUS at 06:09

## 2019-09-24 RX ADMIN — SODIUM CHLORIDE, SODIUM LACTATE, POTASSIUM CHLORIDE, AND CALCIUM CHLORIDE: 600; 310; 30; 20 INJECTION, SOLUTION INTRAVENOUS at 08:09

## 2019-09-24 RX ADMIN — LIDOCAINE HYDROCHLORIDE 50 MG: 10 INJECTION, SOLUTION EPIDURAL; INFILTRATION; INTRACAUDAL; PERINEURAL at 07:09

## 2019-09-24 NOTE — TRANSFER OF CARE
Anesthesia Transfer of Care Note    Patient: Delfino Monk    Procedure(s) Performed: Procedure(s) (LRB):  URETEROSCOPY (Left)  EXTRACTION - STONE (Left)  LITHOTRIPSY, USING LASER (Left)  CYSTOSCOPY, WITH URETERAL STENT INSERTION (Left)    Patient location: PACU    Anesthesia Type: general    Transport from OR: Transported from OR on room air with adequate spontaneous ventilation    Post pain: adequate analgesia    Post assessment: no apparent anesthetic complications    Post vital signs: stable    Level of consciousness: awake, alert and oriented    Nausea/Vomiting: no nausea/vomiting    Complications: none    Transfer of care protocol was followed      Last vitals:   Visit Vitals  /69 (BP Location: Right arm, Patient Position: Lying)   Pulse 81   Temp 36.6 °C (97.8 °F) (Skin)   Resp 18   Ht 6' (1.829 m)   Wt 88.5 kg (195 lb 1.7 oz)   SpO2 (!) 94%   BMI 26.46 kg/m²

## 2019-09-24 NOTE — TELEPHONE ENCOUNTER
----- Message from Bradly Green IV, MD sent at 9/24/2019  8:15 AM CDT -----  RTC next Wed to d/c soto and stent

## 2019-09-24 NOTE — PLAN OF CARE
POC and discharge instructions reviewed with pt and pt's spouse. Pt and spouse state they are very familiar with proper care and maintenance of soto. No questions noted.

## 2019-09-24 NOTE — OP NOTE
Date of Procedure: 09/24/2019    PREOPERATIVE DIAGNOSIS:  Left ureteral stone.                                                                                                           POSTOPERATIVE DIAGNOSIS:  Left ureteral stone.                               PROCEDURES:                                                                  1.  Left ureteroscopy with laser lithotripsy and stone basket extraction.                          2.  Cystoscopy with placement of left double-J ureteral stent.              3.  Fluoro less than 1 hour.                                                 SURGEON:  Dawit Green IV, M.D.                                          Assistants: None    Specimen: None    Prosthetics, Devices, Grafts: None    ANESTHESIA:  General endotracheal.                                           FINDINGS:  The patient had an approximately 9mm stone in the distal left ureter which was obstructing.  All procedures staged after prior surgery.  Ureteroscopy was possible after placement of an appropriate guidewire. The stone was broken into small pieces. A stone basket was used, and all significant pieces were removed and brought to the back table for pathologic examination.  A 6-Tongan x 28 cm double-J ureteral stent was placed. Flouroscopy was used throughout the case for wire and scope guidance, and if applicable to check final stent placement.                                    BLOOD LOSS:  None.                                                           BLOOD GIVEN:  None.                                                          URINE OUTPUT:  None.                                                         DRAINS:  6-Tongan x 28 cm left double-J ureteral stent, 22Fr Chávez to gravity                   PROCEDURE IN DETAIL:  After proper consents were obtained, the patient was brought to the Operating Room where he was prepped and draped in normal sterile fashion and provided general endotracheal anesthesia  in dorsal lithotomy position.  A 22-Bahamian cystoscope was assembled and introduced per urethra and the bladder was rinsed and drained.  Fluoroscopy was used to evaluate stone position at the start of the case and throughout the case for wire and scope guidance.    The previous stent was brought to the meatus and could not be cannulated.  It was left while the ureteroscope was used to find the lumen of the ureter and pass a new wire.  The stent was then withdrawn fully.    The semirigid ureteroscope was then brought to bear and easily passed into the ureteral orifice.  The stone was deeply impacted in the sidewall of the left ureter.  It was visible in a defined pocket.  The medium laser fiber was brought in and with 1 joules of energy and 5 repetitions per second, the stone was broken into a large number of small pieces.  It was observed that the pieces would not pass easily through the distal ureter, so a tipless stone basket was used and with multiple passes into the ureter, the larger stone fragments were brought to the bladder or back table.  The ureteroscope was then set aside and the cystoscope replaced into the bladder over the wire.  A double-J ureteral stent was advanced coaxially over the wire and up to the renal pelvis, and there was an excellent curl on both ends when the wire was withdrawn, using fluoroscopic guidance.      String was left attached and later taped to the penis, so the patient can remove the stent at a later date.      The bladder was then rinsed and drained and stone fragments collected for pathologic examination.  After the bladder was drained, 22Fr soto was placed with 10ml in the balloon, then cystoscope was withdrawn and set aside.      The pt tolerated all of this well, and there were no complications.  he was awakened and transferred to Recovery in stable condition.

## 2019-09-24 NOTE — ANESTHESIA RELEASE NOTE
Anesthesia Release from PACU Note    Patient: Delfino Monk    Procedure(s) Performed: Procedure(s) (LRB):  URETEROSCOPY (Left)  EXTRACTION - STONE (Left)  LITHOTRIPSY, USING LASER (Left)  CYSTOSCOPY, WITH URETERAL STENT INSERTION (Left)    Anesthesia type: general    Post pain: Adequate analgesia    Post assessment: no apparent anesthetic complications    Last Vitals:   Visit Vitals  BP (!) 148/81   Pulse 78   Temp 36.4 °C (97.5 °F) (Temporal)   Resp 16   Ht 6' (1.829 m)   Wt 88.5 kg (195 lb 1.7 oz)   SpO2 96%   BMI 26.46 kg/m²       Post vital signs: stable    Level of consciousness: awake    Nausea/Vomiting: no nausea/no vomiting    Complications: none    Airway Patency: patent    Respiratory: unassisted, room air    Cardiovascular: stable and blood pressure at baseline    Hydration: euvolemic

## 2019-09-24 NOTE — ANESTHESIA PREPROCEDURE EVALUATION
09/24/2019  Delfino Monk is a 64 y.o., male.    Anesthesia Evaluation    I have reviewed the Patient Summary Reports.    I have reviewed the Nursing Notes.   I have reviewed the Medications.     Review of Systems  Anesthesia Hx:  No problems with previous Anesthesia Denies Hx of Anesthetic complications  Neg history of prior surgery. Denies Family Hx of Anesthesia complications.   Denies Personal Hx of Anesthesia complications.   Social:  Non-Smoker, No Alcohol Use    Hematology/Oncology:  Hematology Normal   Oncology Normal     EENT/Dental:EENT/Dental Normal   Cardiovascular:   Exercise tolerance: good Hypertension NYHA Classification I ECG has been reviewed.    Pulmonary:  Pulmonary Normal    Renal/:   Chronic Renal Disease renal calculi    Hepatic/GI:  Hepatic/GI Normal    Musculoskeletal:  Musculoskeletal Normal    Neurological:   Neuromuscular Disease,    Endocrine:  Endocrine Normal    Dermatological:  Skin Normal    Psych:  Psychiatric Normal           Physical Exam  General:  Well nourished    Airway/Jaw/Neck:  Airway Findings: Mouth Opening: Normal Tongue: Normal  General Airway Assessment: Adult  Mallampati: II  TM Distance: 4 - 6 cm  Jaw/Neck Findings:  Neck ROM: Normal ROM      Dental:  Dental Findings: In tact   Chest/Lungs:  Chest/Lungs Findings: Clear to auscultation, Normal Respiratory Rate     Heart/Vascular:  Heart Findings: Rate: Normal  Rhythm: Regular Rhythm  Sounds: Normal        Mental Status:  Mental Status Findings:  Cooperative, Alert and Oriented         Anesthesia Plan  Type of Anesthesia, risks & benefits discussed:  Anesthesia Type:  general  Patient's Preference:   Intra-op Monitoring Plan: standard ASA monitors  Intra-op Monitoring Plan Comments:   Post Op Pain Control Plan: per primary service following discharge from PACU  Post Op Pain Control Plan Comments:    Induction:   IV  Beta Blocker:  Patient is not currently on a Beta-Blocker (No further documentation required).       Informed Consent: Patient understands risks and agrees with Anesthesia plan.  Questions answered. Anesthesia consent signed with patient.  ASA Score: 2     Day of Surgery Review of History & Physical: I have interviewed and examined the patient. I have reviewed the patient's H&P dated:    H&P update referred to the surgeon.         Ready For Surgery From Anesthesia Perspective.

## 2019-09-24 NOTE — ANESTHESIA POSTPROCEDURE EVALUATION
Anesthesia Post Evaluation    Patient: Delfino Monk    Procedure(s) Performed: Procedure(s) (LRB):  URETEROSCOPY (Left)  EXTRACTION - STONE (Left)  LITHOTRIPSY, USING LASER (Left)  CYSTOSCOPY, WITH URETERAL STENT INSERTION (Left)    Final Anesthesia Type: general  Patient location during evaluation: PACU  Patient participation: Yes- Able to Participate  Level of consciousness: awake and alert  Pain management: adequate  Airway patency: patent  PONV status at discharge: No PONV  Anesthetic complications: no      Cardiovascular status: blood pressure returned to baseline  Respiratory status: unassisted  Hydration status: euvolemic  Follow-up not needed.          Vitals Value Taken Time   /81 9/24/2019  9:40 AM   Temp 36.4 °C (97.5 °F) 9/24/2019  9:40 AM   Pulse 78 9/24/2019  9:40 AM   Resp 16 9/24/2019  9:40 AM   SpO2 96 % 9/24/2019  9:40 AM         Event Time     Out of Recovery 09:13:04          Pain/Natalee Score: Pain Rating Prior to Med Admin: 3 (9/24/2019  8:58 AM)  Natalee Score: 10 (9/24/2019  9:30 AM)

## 2019-09-25 VITALS
HEIGHT: 72 IN | BODY MASS INDEX: 26.43 KG/M2 | HEART RATE: 78 BPM | DIASTOLIC BLOOD PRESSURE: 81 MMHG | WEIGHT: 195.13 LBS | SYSTOLIC BLOOD PRESSURE: 148 MMHG | TEMPERATURE: 98 F | OXYGEN SATURATION: 96 % | RESPIRATION RATE: 16 BRPM

## 2019-09-28 LAB
COMPN STONE: NORMAL
SPECIMEN SOURCE: NORMAL
STONE ANALYSIS IR-IMP: NORMAL

## 2019-10-02 ENCOUNTER — CLINICAL SUPPORT (OUTPATIENT)
Dept: UROLOGY | Facility: CLINIC | Age: 64
End: 2019-10-02
Payer: COMMERCIAL

## 2019-10-02 PROCEDURE — 99999 PR PBB SHADOW E&M-EST. PATIENT-LVL I: CPT | Mod: PBBFAC,,,

## 2019-10-02 PROCEDURE — 99999 PR PBB SHADOW E&M-EST. PATIENT-LVL I: ICD-10-PCS | Mod: PBBFAC,,,

## 2019-10-02 NOTE — PROGRESS NOTES
..Pt in to DC osto per MD. Deflated 10 cc balloon. Removed catheter without incident. Also d/c stent per Dr. Green. Pt tolerated well. Post op appt given.

## 2019-10-16 ENCOUNTER — OFFICE VISIT (OUTPATIENT)
Dept: UROLOGY | Facility: CLINIC | Age: 64
End: 2019-10-16
Payer: COMMERCIAL

## 2019-10-16 VITALS
SYSTOLIC BLOOD PRESSURE: 118 MMHG | HEART RATE: 81 BPM | HEIGHT: 72 IN | DIASTOLIC BLOOD PRESSURE: 58 MMHG | WEIGHT: 195.13 LBS | BODY MASS INDEX: 26.43 KG/M2

## 2019-10-16 DIAGNOSIS — N20.1 URETERAL STONE: Primary | ICD-10-CM

## 2019-10-16 PROCEDURE — 81002 URINALYSIS NONAUTO W/O SCOPE: CPT | Mod: S$GLB,,, | Performed by: UROLOGY

## 2019-10-16 PROCEDURE — 99024 PR POST-OP FOLLOW-UP VISIT: ICD-10-PCS | Mod: S$GLB,,, | Performed by: UROLOGY

## 2019-10-16 PROCEDURE — 99999 PR PBB SHADOW E&M-EST. PATIENT-LVL III: ICD-10-PCS | Mod: PBBFAC,,, | Performed by: UROLOGY

## 2019-10-16 PROCEDURE — 81002 POCT URINE DIPSTICK WITHOUT MICROSCOPE: ICD-10-PCS | Mod: S$GLB,,, | Performed by: UROLOGY

## 2019-10-16 PROCEDURE — 99999 PR PBB SHADOW E&M-EST. PATIENT-LVL III: CPT | Mod: PBBFAC,,, | Performed by: UROLOGY

## 2019-10-16 PROCEDURE — 99024 POSTOP FOLLOW-UP VISIT: CPT | Mod: S$GLB,,, | Performed by: UROLOGY

## 2019-10-16 NOTE — PROGRESS NOTES
Chief Complaint: BPH/Stones    HPI:   10/16/19: Eliminated the stone at URS after last appt.  Stent removed after. Doing well today no complaints.  9/11/19: Had left URS and the stone could not be observed, has a stent since.  Reviewed history in detail.  Difficult case it seems the stone is present on CT but was not observed at URS.  Hopefully access will be easier after the stent has been there; unclear if possibly extrinsic to ureter.  8/22/19: Left URS no stone observed in the ureter  8/15/19: 4d ago went to ER here for left distal ureteral stone, was transferred to [facility] and had a procedure on his stone, sounds like ESWL (3000 shocks).  Has been to the ER since then, PVR was normal on check.  A soto was placed and no urine product and soto was removed.  Has not passed any fragments.  10/1/18: KUB/US 7/18 reassuring.  Voiding fine.  1/29/18: Laws: One month PVR recheck. Feeling good; voiding back to normal. Good stream. Taking Flomax and Finasteride. No gross hematuria. No SP pain or dysuria.   12/27/17: Laws: Doing well since stent removal last week; feels like he is emptying well. Soto has been out a week. No gross hematuria. No pain at all. Taking Flomax and Finasteride. Says he drinks a lot of water and does not eat a high salt diet.   12/18/17: URS complete, stone removed.  Stent out today.  PVR reassuring, voiding trial.   12/6/17: Was traveling and entered retention he thought he was in retention with 800ml and a soto was placed.  Had a CT after that that showed a kidney stone and was given some pain meds but has had no pain since he was at the ER.  Was using the oxybutinin when he entered retention.  Has a 8mm right UVJ stone.  11/20/17: Cysto today shows very large median lobe but also strong evidence of severe OAB with bladder spasms at 150 ml pushing water out around the scope.  11/17/17: Over last 3-4d got worsening stream, urgency, SP tenderness; last night entered retention.  Hardly anything  "came out when catheter was put in though (maybe 50ml).  Was having a ton of pain before that; can't say if it was back or bladder or brain.  Catheter did relieve the symptoms though.  No constipation daily BM but looser lately.  No dysuria.  Taking flomax/finasteride.  9/25/17: No problems at all, Reviewed history in detail.  PSA not done.  Two polyps on colonoscopy were assessed, not worrisome.  7/1/16: No new complaints.  Voiding well.  4/12/16: Alycia: "This 60-year-old male returns to the clinic today because of another episode of urinary retention following gallbladder surgery he had on Friday.  He was catheterized, and has been doing reasonably well with the Soto catheter.  He would prefer to get the catheter out today if that is possible.  My recommendation is to proceed with a voiding trial, and he agrees."  Voided fine  9/9/15: returns after having left IH repair with Dr. Murphy last Friday and entered retention and was discharge with a soto.  Removed today.   7/1/15: No adverse changes - satisfied. PSA approp on finasteride and he is voiding better.  6/25/14: PSA much reduced now on finasteride.  No urinary bother. Since his last visit he is voiding fine and feeling well. Good stream no dribble.  12/20/13: Saw LDestini Rushing recently with hesitancy nd elevated PVR while taking cold medications. Instructed to hold them.  Here today with PVR 47ml.  Started finasteride and has been on it.  Last May he had retention  Had elevated PSA and after the instrumentation was removed his PSA lowered appropriately.    6/10/13: Last note: "This patient was seen earlier this year for an acute onset of urinary retention secondary to laparoscopic bilateral hernia repair. He had a voiding trial but failed and had to go back to the ER that night for another catheter. Patient was started on Flomax and had no concerns for a while with another voiding trial a week later. His psa levels were elevated and he completed 4 weeks of " "cipro and was to have another psa level assessed. Patient presented again as a follow-up to an acute onset of urinary retention, where he presented to [facility] and a soto was placed. Patient was given Levaquin and had his soto removed. This was his second time this yr with urinary retention. He feels his stream is okay but sometimes weak; occasional splitting. No gross hematuria before the inguinal hernia surgery but has had some associated with catheterization."    Allergies:  Allergies   Allergen Reactions    Pcn [Penicillins]     Augmentin [Amoxicillin-Pot Clavulanate] Rash       Medications:  has a current medication list which includes the following prescription(s): aspirin, finasteride, olmesartan, tamsulosin, acetaminophen, docusate sodium, and oxycodone-acetaminophen.    PMH:   has a past medical history of BPH (benign prostatic hyperplasia), General anesthetics causing adverse effect in therapeutic use, HTN (hypertension), Kidney stone, and PONV (postoperative nausea and vomiting).    PSH:   has a past surgical history that includes left ankle; TONSILLECTOMY, ADENOIDECTOMY; Cholecystectomy (2016); Cystoscopy w/ ureteral stent placement (Left, 8/22/2019); Retrograde pyelography (Left, 8/22/2019); Ureteroscopy (Left, 8/22/2019); Hernia repair (Bilateral); Hernia repair (Left); Ureteroscopy (Left, 9/24/2019); Laser lithotripsy (Left, 9/24/2019); and Cystoscopy w/ ureteral stent placement (Left, 9/24/2019).    FamHx: family history is not on file.    SocHx:  reports that he has never smoked. He has never used smokeless tobacco. He reports that he does not drink alcohol or use drugs.      Physical Exam:  Vitals:    10/16/19 1410   BP: (!) 118/58   Pulse: 81     General: A&Ox3, no apparent distress, no deformities  Neck: No masses, normal thyroid  Abdomen: Soft, NT, ND; has a large reducible left inguinal hernia  Skin: The skin is warm and dry. No jaundice.  Ext: No c/c/e.  :   12/17/17: deferred (HANDY " 9/17)    Labs/Studies:   Urinalysis performed in clinic, summary: UA normal  PVR 12/17/17: 191 -> voided then 55 ml  PVR 1/29/18: 143 -> voided then 67 ml  Bladder Scan performed in office:     8/15/19: PVR 19 ml.  PSA very normal now, 2.2    6/15: 1.6    9/17: 1.6    9/18: 1.3    Impression/Plan:   1. US now and then KUB/US/RTC 6 mo.  2. 24 hour urine and RTC after done (1 mo.

## 2019-10-29 ENCOUNTER — TELEPHONE (OUTPATIENT)
Dept: RADIOLOGY | Facility: HOSPITAL | Age: 64
End: 2019-10-29

## 2019-10-30 ENCOUNTER — HOSPITAL ENCOUNTER (OUTPATIENT)
Dept: RADIOLOGY | Facility: HOSPITAL | Age: 64
Discharge: HOME | End: 2019-10-30
Attending: UROLOGY
Payer: COMMERCIAL

## 2019-10-30 DIAGNOSIS — N20.1 URETERAL STONE: ICD-10-CM

## 2019-10-30 PROCEDURE — 76770 US EXAM ABDO BACK WALL COMP: CPT | Mod: TC

## 2019-10-30 PROCEDURE — 76770 US RETROPERITONEAL COMPLETE: ICD-10-PCS | Mod: 26,,, | Performed by: RADIOLOGY

## 2019-10-30 PROCEDURE — 76770 US EXAM ABDO BACK WALL COMP: CPT | Mod: 26,,, | Performed by: RADIOLOGY

## 2019-11-13 ENCOUNTER — OFFICE VISIT (OUTPATIENT)
Dept: UROLOGY | Facility: CLINIC | Age: 64
End: 2019-11-13
Payer: COMMERCIAL

## 2019-11-13 VITALS
SYSTOLIC BLOOD PRESSURE: 124 MMHG | DIASTOLIC BLOOD PRESSURE: 78 MMHG | HEIGHT: 72 IN | HEART RATE: 63 BPM | WEIGHT: 200.63 LBS | BODY MASS INDEX: 27.17 KG/M2

## 2019-11-13 DIAGNOSIS — N20.0 RENAL STONE: Primary | ICD-10-CM

## 2019-11-13 PROCEDURE — 3008F PR BODY MASS INDEX (BMI) DOCUMENTED: ICD-10-PCS | Mod: CPTII,S$GLB,, | Performed by: UROLOGY

## 2019-11-13 PROCEDURE — 3074F SYST BP LT 130 MM HG: CPT | Mod: CPTII,S$GLB,, | Performed by: UROLOGY

## 2019-11-13 PROCEDURE — 3078F DIAST BP <80 MM HG: CPT | Mod: CPTII,S$GLB,, | Performed by: UROLOGY

## 2019-11-13 PROCEDURE — 99214 OFFICE O/P EST MOD 30 MIN: CPT | Mod: 25,S$GLB,, | Performed by: UROLOGY

## 2019-11-13 PROCEDURE — 81002 POCT URINE DIPSTICK WITHOUT MICROSCOPE: ICD-10-PCS | Mod: S$GLB,,, | Performed by: UROLOGY

## 2019-11-13 PROCEDURE — 99999 PR PBB SHADOW E&M-EST. PATIENT-LVL II: CPT | Mod: PBBFAC,,, | Performed by: UROLOGY

## 2019-11-13 PROCEDURE — 81002 URINALYSIS NONAUTO W/O SCOPE: CPT | Mod: S$GLB,,, | Performed by: UROLOGY

## 2019-11-13 PROCEDURE — 3008F BODY MASS INDEX DOCD: CPT | Mod: CPTII,S$GLB,, | Performed by: UROLOGY

## 2019-11-13 PROCEDURE — 1101F PT FALLS ASSESS-DOCD LE1/YR: CPT | Mod: CPTII,S$GLB,, | Performed by: UROLOGY

## 2019-11-13 PROCEDURE — 3074F PR MOST RECENT SYSTOLIC BLOOD PRESSURE < 130 MM HG: ICD-10-PCS | Mod: CPTII,S$GLB,, | Performed by: UROLOGY

## 2019-11-13 PROCEDURE — 99999 PR PBB SHADOW E&M-EST. PATIENT-LVL II: ICD-10-PCS | Mod: PBBFAC,,, | Performed by: UROLOGY

## 2019-11-13 PROCEDURE — 3078F PR MOST RECENT DIASTOLIC BLOOD PRESSURE < 80 MM HG: ICD-10-PCS | Mod: CPTII,S$GLB,, | Performed by: UROLOGY

## 2019-11-13 PROCEDURE — 1101F PR PT FALLS ASSESS DOC 0-1 FALLS W/OUT INJ PAST YR: ICD-10-PCS | Mod: CPTII,S$GLB,, | Performed by: UROLOGY

## 2019-11-13 PROCEDURE — 99214 PR OFFICE/OUTPT VISIT, EST, LEVL IV, 30-39 MIN: ICD-10-PCS | Mod: 25,S$GLB,, | Performed by: UROLOGY

## 2019-11-13 RX ORDER — ROSUVASTATIN CALCIUM 20 MG/1
TABLET, COATED ORAL
Refills: 11 | COMMUNITY
Start: 2019-11-05

## 2019-11-13 RX ORDER — SILDENAFIL 50 MG/1
50 TABLET, FILM COATED ORAL DAILY PRN
Qty: 30 TABLET | Refills: 11 | Status: SHIPPED | OUTPATIENT
Start: 2019-11-13 | End: 2020-11-12

## 2019-11-13 NOTE — ADDENDUM NOTE
Addended by: ERNST NORIEGA IV on: 11/13/2019 09:15 AM     Modules accepted: Orders, Level of Service

## 2019-11-13 NOTE — PROGRESS NOTES
Chief Complaint: BPH/Stones    HPI:   11/13/19: Here to discuss 24 hour urine study.  Good UOP (2.1 L/Day), with elevated UA as the sole risk factor shown.  Has changed diet for more water and less salt per our instructions. Discussed new ED.  10/16/19: Eliminated the stone at URS after last appt.  Stent removed after. Doing well today no complaints.  9/11/19: Had left URS and the stone could not be observed, has a stent since.  Reviewed history in detail.  Difficult case it seems the stone is present on CT but was not observed at URS.  Hopefully access will be easier after the stent has been there; unclear if possibly extrinsic to ureter.  8/22/19: Left URS no stone observed in the ureter  8/15/19: 4d ago went to ER here for left distal ureteral stone, was transferred to [facility] and had a procedure on his stone, sounds like ESWL (3000 shocks).  Has been to the ER since then, PVR was normal on check.  A soto was placed and no urine product and soto was removed.  Has not passed any fragments.  10/1/18: KUB/US 7/18 reassuring.  Voiding fine.  1/29/18: Laws: One month PVR recheck. Feeling good; voiding back to normal. Good stream. Taking Flomax and Finasteride. No gross hematuria. No SP pain or dysuria.   12/27/17: Laws: Doing well since stent removal last week; feels like he is emptying well. Soto has been out a week. No gross hematuria. No pain at all. Taking Flomax and Finasteride. Says he drinks a lot of water and does not eat a high salt diet.   12/18/17: URS complete, stone removed.  Stent out today.  PVR reassuring, voiding trial.   12/6/17: Was traveling and entered retention he thought he was in retention with 800ml and a soto was placed.  Had a CT after that that showed a kidney stone and was given some pain meds but has had no pain since he was at the ER.  Was using the oxybutinin when he entered retention.  Has a 8mm right UVJ stone.  11/20/17: Cysto today shows very large median lobe but also strong  "evidence of severe OAB with bladder spasms at 150 ml pushing water out around the scope.  11/17/17: Over last 3-4d got worsening stream, urgency, SP tenderness; last night entered retention.  Hardly anything came out when catheter was put in though (maybe 50ml).  Was having a ton of pain before that; can't say if it was back or bladder or brain.  Catheter did relieve the symptoms though.  No constipation daily BM but looser lately.  No dysuria.  Taking flomax/finasteride.  9/25/17: No problems at all, Reviewed history in detail.  PSA not done.  Two polyps on colonoscopy were assessed, not worrisome.  7/1/16: No new complaints.  Voiding well.  4/12/16: Alycia: "This 60-year-old male returns to the clinic today because of another episode of urinary retention following gallbladder surgery he had on Friday.  He was catheterized, and has been doing reasonably well with the Soto catheter.  He would prefer to get the catheter out today if that is possible.  My recommendation is to proceed with a voiding trial, and he agrees."  Voided fine  9/9/15: returns after having left IH repair with Dr. Murphy last Friday and entered retention and was discharge with a soto.  Removed today.   7/1/15: No adverse changes - satisfied. PSA approp on finasteride and he is voiding better.  6/25/14: PSA much reduced now on finasteride.  No urinary bother. Since his last visit he is voiding fine and feeling well. Good stream no dribble.  12/20/13: Saw L. Rushing recently with hesitancy nd elevated PVR while taking cold medications. Instructed to hold them.  Here today with PVR 47ml.  Started finasteride and has been on it.  Last May he had retention  Had elevated PSA and after the instrumentation was removed his PSA lowered appropriately.    6/10/13: Last note: "This patient was seen earlier this year for an acute onset of urinary retention secondary to laparoscopic bilateral hernia repair. He had a voiding trial but failed and had to go " "back to the ER that night for another catheter. Patient was started on Flomax and had no concerns for a while with another voiding trial a week later. His psa levels were elevated and he completed 4 weeks of cipro and was to have another psa level assessed. Patient presented again as a follow-up to an acute onset of urinary retention, where he presented to [facility] and a soto was placed. Patient was given Levaquin and had his soto removed. This was his second time this yr with urinary retention. He feels his stream is okay but sometimes weak; occasional splitting. No gross hematuria before the inguinal hernia surgery but has had some associated with catheterization."    Allergies:  Allergies   Allergen Reactions    Pcn [Penicillins]     Augmentin [Amoxicillin-Pot Clavulanate] Rash       Medications:  has a current medication list which includes the following prescription(s): acetaminophen, aspirin, finasteride, olmesartan, rosuvastatin, tamsulosin, docusate sodium, and oxycodone-acetaminophen.    PMH:   has a past medical history of BPH (benign prostatic hyperplasia), General anesthetics causing adverse effect in therapeutic use, HTN (hypertension), Kidney stone, and PONV (postoperative nausea and vomiting).    PSH:   has a past surgical history that includes left ankle; TONSILLECTOMY, ADENOIDECTOMY; Cholecystectomy (2016); Cystoscopy w/ ureteral stent placement (Left, 8/22/2019); Retrograde pyelography (Left, 8/22/2019); Ureteroscopy (Left, 8/22/2019); Hernia repair (Bilateral); Hernia repair (Left); Ureteroscopy (Left, 9/24/2019); Laser lithotripsy (Left, 9/24/2019); and Cystoscopy w/ ureteral stent placement (Left, 9/24/2019).    FamHx: family history is not on file.    SocHx:  reports that he has never smoked. He has never used smokeless tobacco. He reports that he does not drink alcohol or use drugs.      Physical Exam:  Vitals:    11/13/19 0839   BP: 124/78   Pulse: 63     General: A&Ox3, no apparent distress, " no deformities  Neck: No masses, normal thyroid  Abdomen: Soft, NT, ND; has a large reducible left inguinal hernia  Skin: The skin is warm and dry. No jaundice.  Ext: No c/c/e.  :   12/17/17: deferred (HNADY 9/17)    Labs/Studies:   Urinalysis performed in clinic, summary: UA normal  PVR 12/17/17: 191 -> voided then 55 ml  PVR 1/29/18: 143 -> voided then 67 ml  Bladder Scan performed in office:     8/15/19: PVR 19 ml.  PSA very normal now, 2.2    6/15: 1.6    9/17: 1.6    9/18: 1.3    Impression/Plan:   1. KUB/US/RTC 4/20  2. Hyperuricosuria; discussed dietary factors.  Very low urine pH may contribute to stones as well.  No meds indicated.  Continue good water intake.  3. Sildenafil for ED new rx.  50's.

## 2019-12-04 NOTE — SUBJECTIVE & OBJECTIVE
Interval History:     Review of Systems   Constitutional: Negative for appetite change, chills, diaphoresis, fatigue, fever and unexpected weight change.   HENT: Negative for congestion, postnasal drip, rhinorrhea, sinus pressure and sore throat.    Respiratory: Negative for cough, chest tightness, shortness of breath and wheezing.    Cardiovascular: Negative for chest pain, palpitations and leg swelling.   Gastrointestinal: Positive for diarrhea. Negative for abdominal distention, abdominal pain, anal bleeding, blood in stool, constipation, nausea and vomiting.        + Flatus.   Genitourinary: Negative for decreased urine volume, dysuria, flank pain, frequency, hematuria and urgency.   Musculoskeletal: Negative for arthralgias, back pain and myalgias.   Skin: Negative for pallor, rash and wound.   Neurological: Negative for dizziness, tremors, syncope, weakness, light-headedness, numbness and headaches.   Psychiatric/Behavioral: Negative for confusion. The patient is not nervous/anxious.    All other systems reviewed and are negative.    Objective:     Vital Signs (Most Recent):  Temp: 97.9 °F (36.6 °C) (01/26/19 1238)  Pulse: (!) 52 (01/26/19 1238)  Resp: 17 (01/26/19 1238)  BP: (!) 114/59 (01/26/19 1238)  SpO2: 95 % (01/26/19 1238) Vital Signs (24h Range):  Temp:  [97.4 °F (36.3 °C)-98.7 °F (37.1 °C)] 97.9 °F (36.6 °C)  Pulse:  [51-68] 52  Resp:  [16-20] 17  SpO2:  [94 %-97 %] 95 %  BP: (100-130)/(52-66) 114/59     Weight: 89.6 kg (197 lb 8.5 oz)  Body mass index is 26.79 kg/m².    Intake/Output Summary (Last 24 hours) at 1/26/2019 1406  Last data filed at 1/26/2019 0040  Gross per 24 hour   Intake --   Output 551 ml   Net -551 ml      Physical Exam   Constitutional: He is oriented to person, place, and time. He appears well-developed and well-nourished. No distress.   HENT:   Head: Normocephalic.   Cardiovascular: Normal rate, regular rhythm and normal heart sounds.   Pulmonary/Chest: Effort normal and breath  sounds normal. No respiratory distress. He has no rales.   Abdominal: Soft. He exhibits no distension. There is no tenderness. There is no guarding.   Musculoskeletal: He exhibits no edema.   Neurological: He is alert and oriented to person, place, and time.   Skin: Skin is warm and dry.   Psychiatric: He has a normal mood and affect. His behavior is normal. Thought content normal.   Nursing note and vitals reviewed.      Significant Labs: All pertinent labs within the past 24 hours have been reviewed.    Significant Imaging: I have reviewed all pertinent imaging results/findings within the past 24 hours.   No

## 2020-05-08 ENCOUNTER — TELEPHONE (OUTPATIENT)
Dept: RADIOLOGY | Facility: HOSPITAL | Age: 65
End: 2020-05-08

## 2020-05-11 ENCOUNTER — PATIENT MESSAGE (OUTPATIENT)
Dept: UROLOGY | Facility: CLINIC | Age: 65
End: 2020-05-11

## 2020-05-11 ENCOUNTER — HOSPITAL ENCOUNTER (OUTPATIENT)
Dept: RADIOLOGY | Facility: HOSPITAL | Age: 65
Discharge: HOME | End: 2020-05-11
Attending: UROLOGY
Payer: COMMERCIAL

## 2020-05-11 DIAGNOSIS — N20.1 URETERAL STONE: ICD-10-CM

## 2020-05-11 PROCEDURE — 76770 US RETROPERITONEAL COMPLETE: ICD-10-PCS | Mod: 26,,, | Performed by: RADIOLOGY

## 2020-05-11 PROCEDURE — 74018 RADEX ABDOMEN 1 VIEW: CPT | Mod: 26,,, | Performed by: RADIOLOGY

## 2020-05-11 PROCEDURE — 74018 XR ABDOMEN AP 1 VIEW: ICD-10-PCS | Mod: 26,,, | Performed by: RADIOLOGY

## 2020-05-11 PROCEDURE — 76770 US EXAM ABDO BACK WALL COMP: CPT | Mod: TC,PO

## 2020-05-11 PROCEDURE — 76770 US EXAM ABDO BACK WALL COMP: CPT | Mod: 26,,, | Performed by: RADIOLOGY

## 2020-05-11 PROCEDURE — 74018 RADEX ABDOMEN 1 VIEW: CPT | Mod: TC,PO

## 2020-06-04 ENCOUNTER — OFFICE VISIT (OUTPATIENT)
Dept: UROLOGY | Facility: CLINIC | Age: 65
End: 2020-06-04
Payer: COMMERCIAL

## 2020-06-04 VITALS
SYSTOLIC BLOOD PRESSURE: 118 MMHG | WEIGHT: 197.56 LBS | DIASTOLIC BLOOD PRESSURE: 70 MMHG | BODY MASS INDEX: 26.76 KG/M2 | TEMPERATURE: 99 F | HEIGHT: 72 IN

## 2020-06-04 DIAGNOSIS — Z12.5 PROSTATE CANCER SCREENING: ICD-10-CM

## 2020-06-04 DIAGNOSIS — N40.0 BENIGN PROSTATIC HYPERPLASIA, UNSPECIFIED WHETHER LOWER URINARY TRACT SYMPTOMS PRESENT: Primary | ICD-10-CM

## 2020-06-04 DIAGNOSIS — I10 HYPERTENSION, UNSPECIFIED TYPE: ICD-10-CM

## 2020-06-04 DIAGNOSIS — N20.0 RENAL STONE: ICD-10-CM

## 2020-06-04 LAB
BILIRUB SERPL-MCNC: NORMAL MG/DL
BLOOD URINE, POC: NORMAL
CLARITY, POC UA: NORMAL
COLOR, POC UA: YELLOW
GLUCOSE UR QL STRIP: NORMAL
KETONES UR QL STRIP: NORMAL
LEUKOCYTE ESTERASE URINE, POC: NORMAL
NITRITE, POC UA: NORMAL
PH, POC UA: 5
PROTEIN, POC: NORMAL
SPECIFIC GRAVITY, POC UA: 1.02
UROBILINOGEN, POC UA: NORMAL

## 2020-06-04 PROCEDURE — 1101F PR PT FALLS ASSESS DOC 0-1 FALLS W/OUT INJ PAST YR: ICD-10-PCS | Mod: CPTII,S$GLB,, | Performed by: UROLOGY

## 2020-06-04 PROCEDURE — 1101F PT FALLS ASSESS-DOCD LE1/YR: CPT | Mod: CPTII,S$GLB,, | Performed by: UROLOGY

## 2020-06-04 PROCEDURE — 99214 PR OFFICE/OUTPT VISIT, EST, LEVL IV, 30-39 MIN: ICD-10-PCS | Mod: 25,S$GLB,, | Performed by: UROLOGY

## 2020-06-04 PROCEDURE — 99999 PR PBB SHADOW E&M-EST. PATIENT-LVL III: ICD-10-PCS | Mod: PBBFAC,,, | Performed by: UROLOGY

## 2020-06-04 PROCEDURE — 3074F PR MOST RECENT SYSTOLIC BLOOD PRESSURE < 130 MM HG: ICD-10-PCS | Mod: CPTII,S$GLB,, | Performed by: UROLOGY

## 2020-06-04 PROCEDURE — 99999 PR PBB SHADOW E&M-EST. PATIENT-LVL III: CPT | Mod: PBBFAC,,, | Performed by: UROLOGY

## 2020-06-04 PROCEDURE — 99214 OFFICE O/P EST MOD 30 MIN: CPT | Mod: 25,S$GLB,, | Performed by: UROLOGY

## 2020-06-04 PROCEDURE — 3008F BODY MASS INDEX DOCD: CPT | Mod: CPTII,S$GLB,, | Performed by: UROLOGY

## 2020-06-04 PROCEDURE — 81002 POCT URINE DIPSTICK WITHOUT MICROSCOPE: ICD-10-PCS | Mod: S$GLB,,, | Performed by: UROLOGY

## 2020-06-04 PROCEDURE — 3008F PR BODY MASS INDEX (BMI) DOCUMENTED: ICD-10-PCS | Mod: CPTII,S$GLB,, | Performed by: UROLOGY

## 2020-06-04 PROCEDURE — 3074F SYST BP LT 130 MM HG: CPT | Mod: CPTII,S$GLB,, | Performed by: UROLOGY

## 2020-06-04 PROCEDURE — 81002 URINALYSIS NONAUTO W/O SCOPE: CPT | Mod: S$GLB,,, | Performed by: UROLOGY

## 2020-06-04 PROCEDURE — 3078F PR MOST RECENT DIASTOLIC BLOOD PRESSURE < 80 MM HG: ICD-10-PCS | Mod: CPTII,S$GLB,, | Performed by: UROLOGY

## 2020-06-04 PROCEDURE — 3078F DIAST BP <80 MM HG: CPT | Mod: CPTII,S$GLB,, | Performed by: UROLOGY

## 2020-06-04 RX ORDER — FINASTERIDE 5 MG/1
5 TABLET, FILM COATED ORAL DAILY
Qty: 30 TABLET | Refills: 11 | Status: SHIPPED | OUTPATIENT
Start: 2020-06-04 | End: 2024-02-08

## 2020-06-04 RX ORDER — TAMSULOSIN HYDROCHLORIDE 0.4 MG/1
0.4 CAPSULE ORAL DAILY
Qty: 30 CAPSULE | Refills: 11 | Status: SHIPPED | OUTPATIENT
Start: 2020-06-04

## 2020-06-04 NOTE — PROGRESS NOTES
Chief Complaint: BPH/Stones    HPI:   6/4/20:  Indep assessment of imaging agree with report: US shows a small left stone that doesn't show on KUB.  Reviewed history in detail. Diet changes underway.  Sildenafil working at 50mg.  11/13/19: Here to discuss 24 hour urine study.  Good UOP (2.1 L/Day), with elevated UA as the sole risk factor shown.  Has changed diet for more water and less salt per our instructions. Discussed new ED.  10/16/19: Eliminated the stone at URS after last appt.  Stent removed after. Doing well today no complaints.  9/11/19: Had left URS and the stone could not be observed, has a stent since.  Reviewed history in detail.  Difficult case it seems the stone is present on CT but was not observed at URS.  Hopefully access will be easier after the stent has been there; unclear if possibly extrinsic to ureter.  8/22/19: Left URS no stone observed in the ureter  8/15/19: 4d ago went to ER here for left distal ureteral stone, was transferred to [facility] and had a procedure on his stone, sounds like ESWL (3000 shocks).  Has been to the ER since then, PVR was normal on check.  A soto was placed and no urine product and soto was removed.  Has not passed any fragments.  10/1/18: KUB/US 7/18 reassuring.  Voiding fine.  1/29/18: Laws: One month PVR recheck. Feeling good; voiding back to normal. Good stream. Taking Flomax and Finasteride. No gross hematuria. No SP pain or dysuria.   12/27/17: Laws: Doing well since stent removal last week; feels like he is emptying well. Soto has been out a week. No gross hematuria. No pain at all. Taking Flomax and Finasteride. Says he drinks a lot of water and does not eat a high salt diet.   12/18/17: URS complete, stone removed.  Stent out today.  PVR reassuring, voiding trial.   12/6/17: Was traveling and entered retention he thought he was in retention with 800ml and a soto was placed.  Had a CT after that that showed a kidney stone and was given some pain meds but  "has had no pain since he was at the ER.  Was using the oxybutinin when he entered retention.  Has a 8mm right UVJ stone.  11/20/17: Cysto today shows very large median lobe but also strong evidence of severe OAB with bladder spasms at 150 ml pushing water out around the scope.  11/17/17: Over last 3-4d got worsening stream, urgency, SP tenderness; last night entered retention.  Hardly anything came out when catheter was put in though (maybe 50ml).  Was having a ton of pain before that; can't say if it was back or bladder or brain.  Catheter did relieve the symptoms though.  No constipation daily BM but looser lately.  No dysuria.  Taking flomax/finasteride.  9/25/17: No problems at all, Reviewed history in detail.  PSA not done.  Two polyps on colonoscopy were assessed, not worrisome.  7/1/16: No new complaints.  Voiding well.  4/12/16: Alycia: "This 60-year-old male returns to the clinic today because of another episode of urinary retention following gallbladder surgery he had on Friday.  He was catheterized, and has been doing reasonably well with the Soto catheter.  He would prefer to get the catheter out today if that is possible.  My recommendation is to proceed with a voiding trial, and he agrees."  Voided fine  9/9/15: returns after having left IH repair with Dr. Murphy last Friday and entered retention and was discharge with a soto.  Removed today.   7/1/15: No adverse changes - satisfied. PSA approp on finasteride and he is voiding better.  6/25/14: PSA much reduced now on finasteride.  No urinary bother. Since his last visit he is voiding fine and feeling well. Good stream no dribble.  12/20/13: Saw L. Rushing recently with hesitancy nd elevated PVR while taking cold medications. Instructed to hold them.  Here today with PVR 47ml.  Started finasteride and has been on it.  Last May he had retention  Had elevated PSA and after the instrumentation was removed his PSA lowered appropriately.    6/10/13: Last " "note: "This patient was seen earlier this year for an acute onset of urinary retention secondary to laparoscopic bilateral hernia repair. He had a voiding trial but failed and had to go back to the ER that night for another catheter. Patient was started on Flomax and had no concerns for a while with another voiding trial a week later. His psa levels were elevated and he completed 4 weeks of cipro and was to have another psa level assessed. Patient presented again as a follow-up to an acute onset of urinary retention, where he presented to [facility] and a soto was placed. Patient was given Levaquin and had his soto removed. This was his second time this yr with urinary retention. He feels his stream is okay but sometimes weak; occasional splitting. No gross hematuria before the inguinal hernia surgery but has had some associated with catheterization."    Allergies:  Allergies   Allergen Reactions    Pcn [Penicillins]     Augmentin [Amoxicillin-Pot Clavulanate] Rash       Medications:  has a current medication list which includes the following prescription(s): acetaminophen, aspirin, docusate sodium, finasteride, olmesartan, rosuvastatin, sildenafil, tamsulosin, and oxycodone-acetaminophen.    PMH:   has a past medical history of BPH (benign prostatic hyperplasia), General anesthetics causing adverse effect in therapeutic use, HTN (hypertension), Kidney stone, and PONV (postoperative nausea and vomiting).    PSH:   has a past surgical history that includes left ankle; TONSILLECTOMY, ADENOIDECTOMY; Cholecystectomy (2016); Cystoscopy w/ ureteral stent placement (Left, 8/22/2019); Retrograde pyelography (Left, 8/22/2019); Ureteroscopy (Left, 8/22/2019); Hernia repair (Bilateral); Hernia repair (Left); Ureteroscopy (Left, 9/24/2019); Laser lithotripsy (Left, 9/24/2019); and Cystoscopy w/ ureteral stent placement (Left, 9/24/2019).    FamHx: family history is not on file.    SocHx:  reports that he has never smoked. He has " never used smokeless tobacco. He reports that he does not drink alcohol or use drugs.      Physical Exam:  Vitals:    06/04/20 1547   BP: 118/70   Temp: 98.8 °F (37.1 °C)     General: A&Ox3, no apparent distress, no deformities  Neck: No masses, normal thyroid  Abdomen: Soft, NT, ND; has a large reducible left inguinal hernia  Skin: The skin is warm and dry. No jaundice.  Ext: No c/c/e.  :   12/17/17: deferred (HANDY 9/17)    Labs/Studies:   Urinalysis performed in clinic, summary: UA normal  PVR 12/17/17: 191 -> voided then 55 ml  PVR 1/29/18: 143 -> voided then 67 ml  Bladder Scan performed in office:     8/15/19: PVR 19 ml.  PSA very normal now, 2.2    6/15: 1.6    9/17: 1.6    9/18: 1.3    Impression/Plan:   1. Stones: KUB/US/RTC 6mo  2. Hyperuricosuria; discussed dietary factors.  Very low urine pH may contribute to stones as well.  No meds indicated.  Continue good water intake.  3. ED: Sildenafil  50's.   4. BPH: flomax/finasteride continues  5. Prostate cnacer screening: PSA next visit  6: HTN: stable on current meds

## 2020-12-01 ENCOUNTER — TELEPHONE (OUTPATIENT)
Dept: RADIOLOGY | Facility: HOSPITAL | Age: 65
End: 2020-12-01

## 2020-12-02 ENCOUNTER — HOSPITAL ENCOUNTER (OUTPATIENT)
Dept: RADIOLOGY | Facility: HOSPITAL | Age: 65
Discharge: HOME | End: 2020-12-02
Attending: UROLOGY
Payer: COMMERCIAL

## 2020-12-02 DIAGNOSIS — I10 HYPERTENSION, UNSPECIFIED TYPE: ICD-10-CM

## 2020-12-02 DIAGNOSIS — N40.0 BENIGN PROSTATIC HYPERPLASIA, UNSPECIFIED WHETHER LOWER URINARY TRACT SYMPTOMS PRESENT: ICD-10-CM

## 2020-12-02 DIAGNOSIS — Z12.5 PROSTATE CANCER SCREENING: ICD-10-CM

## 2020-12-02 DIAGNOSIS — N20.0 RENAL STONE: ICD-10-CM

## 2020-12-02 PROCEDURE — 76770 US EXAM ABDO BACK WALL COMP: CPT | Mod: TC,PO

## 2020-12-02 PROCEDURE — 76770 US RETROPERITONEAL COMPLETE: ICD-10-PCS | Mod: 26,,, | Performed by: RADIOLOGY

## 2020-12-02 PROCEDURE — 76770 US EXAM ABDO BACK WALL COMP: CPT | Mod: 26,,, | Performed by: RADIOLOGY

## 2020-12-02 PROCEDURE — 74018 RADEX ABDOMEN 1 VIEW: CPT | Mod: 26,,, | Performed by: RADIOLOGY

## 2020-12-02 PROCEDURE — 74018 RADEX ABDOMEN 1 VIEW: CPT | Mod: TC,PO

## 2020-12-02 PROCEDURE — 74018 XR ABDOMEN AP 1 VIEW: ICD-10-PCS | Mod: 26,,, | Performed by: RADIOLOGY

## 2024-01-05 NOTE — PROGRESS NOTES
Bill For Surgical Tray: no [facility] -   Nephrology  Progress Note    Patient Name: Delfino Monk  MRN: [id number]  Admission Date: 1/25/2019  Hospital Length of Stay: 2 days  Attending Provider: Johnathan Gomes, *   Primary Care Physician: Edgard Mendenhall MD  Principal Problem:BERTIN (acute kidney injury)    Subjective:     HPI: Pt was seen and examined. H/o was reviewed. Pt is a 64 y/o male who presented with 5 day h/o of severe diarrhea. S Cr has worsened from normal to >8 mg/dl. Pt denies taking NSAIds. Has a h/o of hTN and also was taking benicar. Pt had tried to replace fluid losses. Starting today, he could not urinate and pt presented to ER. Pt's 2 year old grandson also recently had diarrhea. Pt has had multiple water BM's (7-8/day), non-bloody, no fever, no abd pain. Currently feeling well, no new c/o's. PMH reviewed with him. Pt also has a h/o of nephrolithiasis in Dec 2017, stone was removed, 1.2 cm. Interestingly pt's 83 y/o father also passes a stone 2 weeks after pt passed had his. No h/o of cystine stones (familital), diet was explored, no food fetishes in pt or his father. Records reviewed, could not find stone analysis results. No stones before or after that episode.    Interval History: Pt was seen and examined. No new issues. Pt being discharged home today. Has no further diarrhea, no discomfort, feels ready to go home.    Review of patient's allergies indicates:   Allergen Reactions    Augmentin [amoxicillin-pot clavulanate] Rash    Pcn [penicillins] Rash     Current Facility-Administered Medications   Medication Frequency    0.9%  NaCl infusion Continuous    heparin (porcine) injection 5,000 Units Q8H    ondansetron injection 4 mg Q6H PRN    pantoprazole EC tablet 40 mg Daily    promethazine (PHENERGAN) 6.25 mg in dextrose 5 % 50 mL IVPB Q6H PRN    sodium chloride 0.9% flush 5 mL PRN    tamsulosin 24 hr capsule 0.4 mg Daily       Objective:     Vital Signs (Most Recent):  Temp: 97.8 °F  (36.6 °C) (01/27/19 0711)  Pulse: 63 (01/27/19 0958)  Resp: 18 (01/27/19 0711)  BP: 116/65 (01/27/19 0711)  SpO2: 95 % (01/27/19 0711)  O2 Device (Oxygen Therapy): room air (01/27/19 0711) Vital Signs (24h Range):  Temp:  [97.5 °F (36.4 °C)-98.5 °F (36.9 °C)] 97.8 °F (36.6 °C)  Pulse:  [51-63] 63  Resp:  [17-18] 18  SpO2:  [94 %-97 %] 95 %  BP: (114-129)/(59-69) 116/65     Weight: 89.6 kg (197 lb 8.5 oz) (01/25/19 0852)  Body mass index is 26.79 kg/m².  Body surface area is 2.13 meters squared.    I/O last 3 completed shifts:  In: 3412.9 [P.O.:240; I.V.:3172.9]  Out: 111 [Urine:111]    Physical Exam   Constitutional: He is oriented to person, place, and time. He appears well-developed and well-nourished. No distress.   HENT:   Head: Normocephalic.   Cardiovascular: Normal rate, regular rhythm and normal heart sounds.   Pulmonary/Chest: Effort normal and breath sounds normal. No respiratory distress. He has no rales.   Abdominal: Soft. He exhibits no distension. There is no tenderness. There is no guarding.   Musculoskeletal: He exhibits no edema.   Neurological: He is alert and oriented to person, place, and time.   Skin: Skin is warm and dry.   Psychiatric: He has a normal mood and affect. His behavior is normal. Thought content normal.   Nursing note and vitals reviewed.      Significant Labs: reviewed  BMP  Lab Results   Component Value Date     01/27/2019    K 4.3 01/27/2019     (H) 01/27/2019    CO2 23 01/27/2019    BUN 26 (H) 01/27/2019    CREATININE 1.0 01/27/2019    CALCIUM 8.4 (L) 01/27/2019    ANIONGAP 7 (L) 01/27/2019    ESTGFRAFRICA >60 01/27/2019    EGFRNONAA >60 01/27/2019     Lab Results   Component Value Date    WBC 6.50 01/27/2019    HGB 13.4 (L) 01/27/2019    HCT 39.0 (L) 01/27/2019    MCV 90 01/27/2019     01/27/2019           Assessment/Plan:         62 y/o male with BERTIN and diarrhea:           BERTIN (acute kidney injury)     BERTIN resolved. s Cr normal  BERTIN likely was due to  Total Volume (Ccs): 1 Cautery Type: electrodesiccation diarrhea causing decreased renal perfusion  K is not low  Mild metabolic acidosis (non AG), c/w diarrhea, improved  Hypotension as expected with fluid losses, BP stable and improved  Tolerating PO feed well     Diarrhea: acute, resolved  Afebrile   WBC not high  Acute gastroenteritis  Likely viral      Nephrolithiasis     Had a large kidney stone 1.2 cm in Dec 2017  No acute issues  To review:  Pt's father had also a kidney stone 2 weeks later, unusual!  No h/o of cystine stone (hereditary)  No family food fetishes identified that would predispose 2 family members to kidney stones  Kidney stones in general discussed with pt  The stone had been sent for analysis, could not find the results in epic.         Plans and recommendations:  As discussed above  OK to d/c home from our view  Will arrange post hospital renal clinic w/u for kidney stone w/u.               Wilian Shepherd MD  Nephrology  [facility]   What Was Performed First?: Curettage Size Of Lesion In Cm: 1.3 Detail Level: Detailed Is This A New Presentation, Or A Follow-Up?: Skin Lesion Consent was obtained from the patient. The risks, benefits and alternatives to therapy were discussed in detail. Specifically, the risks of infection, scarring, bleeding, prolonged wound healing, nerve injury, incomplete removal, allergy to anesthesia and recurrence were addressed. Alternatives to ED&C, such as: surgical removal and XRT were also discussed.  Prior to the procedure, the treatment site was clearly identified and confirmed by the patient. All components of Universal Protocol/PAUSE Rule completed. How Severe Is Your Skin Lesion?: moderate Anesthesia Volume In Cc: 3 Has Your Skin Lesion Been Treated?: not been treated Bill As A Line Item Or As Units: Line Item Post-Care Instructions: I reviewed with the patient in detail post-care instructions. Patient is to keep the area dry for 48 hours, and not to engage in any swimming until the area is healed. Should the patient develop any fevers, chills, bleeding, severe pain patient will contact the office immediately. Additional Information: (Optional): The wound was cleaned, and a pressure dressing was applied.  The patient received detailed post-op instructions. Size Of Lesion After Curettage: 1.8 Anesthesia Type: 1% lidocaine with epinephrine

## 2024-02-08 ENCOUNTER — HOSPITAL ENCOUNTER (EMERGENCY)
Facility: HOSPITAL | Age: 69
Discharge: HOME | End: 2024-02-08
Attending: EMERGENCY MEDICINE
Payer: COMMERCIAL

## 2024-02-08 VITALS
WEIGHT: 188.69 LBS | SYSTOLIC BLOOD PRESSURE: 133 MMHG | HEIGHT: 72 IN | OXYGEN SATURATION: 97 % | RESPIRATION RATE: 20 BRPM | HEART RATE: 55 BPM | BODY MASS INDEX: 25.56 KG/M2 | DIASTOLIC BLOOD PRESSURE: 77 MMHG | TEMPERATURE: 98 F

## 2024-02-08 DIAGNOSIS — U07.1 COVID: Primary | ICD-10-CM

## 2024-02-08 LAB
ALBUMIN SERPL BCP-MCNC: 4 G/DL (ref 3.5–5.2)
ALP SERPL-CCNC: 50 U/L (ref 55–135)
ALT SERPL W/O P-5'-P-CCNC: 34 U/L (ref 10–44)
ANION GAP SERPL CALC-SCNC: 8 MMOL/L (ref 8–16)
AST SERPL-CCNC: 26 U/L (ref 10–40)
BASOPHILS # BLD AUTO: 0.03 K/UL (ref 0–0.2)
BASOPHILS NFR BLD: 0.6 % (ref 0–1.9)
BILIRUB SERPL-MCNC: 0.7 MG/DL (ref 0.1–1)
BILIRUB UR QL STRIP: NEGATIVE
BUN SERPL-MCNC: 13 MG/DL (ref 8–23)
CALCIUM SERPL-MCNC: 9.2 MG/DL (ref 8.7–10.5)
CHLORIDE SERPL-SCNC: 103 MMOL/L (ref 95–110)
CLARITY UR REFRACT.AUTO: CLEAR
CO2 SERPL-SCNC: 26 MMOL/L (ref 23–29)
COLOR UR AUTO: YELLOW
CREAT SERPL-MCNC: 1 MG/DL (ref 0.5–1.4)
DIFFERENTIAL METHOD BLD: ABNORMAL
EOSINOPHIL # BLD AUTO: 0.3 K/UL (ref 0–0.5)
EOSINOPHIL NFR BLD: 6.1 % (ref 0–8)
ERYTHROCYTE [DISTWIDTH] IN BLOOD BY AUTOMATED COUNT: 11.9 % (ref 11.5–14.5)
EST. GFR  (NO RACE VARIABLE): >60 ML/MIN/1.73 M^2
GLUCOSE SERPL-MCNC: 153 MG/DL (ref 70–110)
GLUCOSE UR QL STRIP: NEGATIVE
HCT VFR BLD AUTO: 42.7 % (ref 40–54)
HCV AB SERPL QL IA: NEGATIVE
HEP C VIRUS HOLD SPECIMEN: NORMAL
HGB BLD-MCNC: 14.9 G/DL (ref 14–18)
HGB UR QL STRIP: NEGATIVE
HIV 1+2 AB+HIV1 P24 AG SERPL QL IA: NEGATIVE
IMM GRANULOCYTES # BLD AUTO: 0.01 K/UL (ref 0–0.04)
IMM GRANULOCYTES NFR BLD AUTO: 0.2 % (ref 0–0.5)
KETONES UR QL STRIP: NEGATIVE
LEUKOCYTE ESTERASE UR QL STRIP: NEGATIVE
LYMPHOCYTES # BLD AUTO: 1.5 K/UL (ref 1–4.8)
LYMPHOCYTES NFR BLD: 28.4 % (ref 18–48)
MAGNESIUM SERPL-MCNC: 1.8 MG/DL (ref 1.6–2.6)
MCH RBC QN AUTO: 31.4 PG (ref 27–31)
MCHC RBC AUTO-ENTMCNC: 34.9 G/DL (ref 32–36)
MCV RBC AUTO: 90 FL (ref 82–98)
MONOCYTES # BLD AUTO: 0.9 K/UL (ref 0.3–1)
MONOCYTES NFR BLD: 17.1 % (ref 4–15)
NEUTROPHILS # BLD AUTO: 2.6 K/UL (ref 1.8–7.7)
NEUTROPHILS NFR BLD: 47.6 % (ref 38–73)
NITRITE UR QL STRIP: NEGATIVE
NRBC BLD-RTO: 0 /100 WBC
PH UR STRIP: 6 [PH] (ref 5–8)
PLATELET # BLD AUTO: 184 K/UL (ref 150–450)
PMV BLD AUTO: 10 FL (ref 9.2–12.9)
POTASSIUM SERPL-SCNC: 4.8 MMOL/L (ref 3.5–5.1)
PROT SERPL-MCNC: 7.2 G/DL (ref 6–8.4)
PROT UR QL STRIP: NEGATIVE
RBC # BLD AUTO: 4.74 M/UL (ref 4.6–6.2)
SODIUM SERPL-SCNC: 137 MMOL/L (ref 136–145)
SP GR UR STRIP: 1.02 (ref 1–1.03)
URN SPEC COLLECT METH UR: NORMAL
UROBILINOGEN UR STRIP-ACNC: <2 EU/DL
WBC # BLD AUTO: 5.38 K/UL (ref 3.9–12.7)

## 2024-02-08 PROCEDURE — 81003 URINALYSIS AUTO W/O SCOPE: CPT | Mod: ER | Performed by: EMERGENCY MEDICINE

## 2024-02-08 PROCEDURE — 80053 COMPREHEN METABOLIC PANEL: CPT | Mod: ER | Performed by: EMERGENCY MEDICINE

## 2024-02-08 PROCEDURE — 87389 HIV-1 AG W/HIV-1&-2 AB AG IA: CPT | Performed by: EMERGENCY MEDICINE

## 2024-02-08 PROCEDURE — 86803 HEPATITIS C AB TEST: CPT | Performed by: EMERGENCY MEDICINE

## 2024-02-08 PROCEDURE — 25000003 PHARM REV CODE 250: Mod: ER | Performed by: EMERGENCY MEDICINE

## 2024-02-08 PROCEDURE — 96361 HYDRATE IV INFUSION ADD-ON: CPT | Mod: ER

## 2024-02-08 PROCEDURE — 85025 COMPLETE CBC W/AUTO DIFF WBC: CPT | Mod: ER | Performed by: EMERGENCY MEDICINE

## 2024-02-08 PROCEDURE — 83735 ASSAY OF MAGNESIUM: CPT | Mod: ER | Performed by: EMERGENCY MEDICINE

## 2024-02-08 PROCEDURE — 96360 HYDRATION IV INFUSION INIT: CPT | Mod: ER

## 2024-02-08 PROCEDURE — 99284 EMERGENCY DEPT VISIT MOD MDM: CPT | Mod: 25,ER

## 2024-02-08 RX ORDER — GUAIFENESIN AND DEXTROMETHORPHAN HYDROBROMIDE 10; 100 MG/5ML; MG/5ML
10 SYRUP ORAL EVERY 6 HOURS PRN
Qty: 354 ML | Refills: 0 | Status: SHIPPED | OUTPATIENT
Start: 2024-02-08

## 2024-02-08 RX ADMIN — SODIUM CHLORIDE 1000 ML: 9 INJECTION, SOLUTION INTRAVENOUS at 11:02

## 2024-02-08 RX ADMIN — SODIUM CHLORIDE 500 ML: 9 INJECTION, SOLUTION INTRAVENOUS at 12:02

## 2024-02-08 NOTE — DISCHARGE INSTRUCTIONS
Home Covid Therapies    Take a fever reducer:  Take a fever reducers.   Acetaminophen is what is usually recommended.  Do not exceed recommended daily dosages.    Stay hydrated. Fever usually causes sweating, which means los  of water from your body.   Drink lots of fluids.  Preferably water or pedialyte.   Sip on drinks throughout the day.   Not only will this keep your throat moist and comfortable, it will also help keep you hydrated.  Drink Warm beverages, like tea or broth.  The will heat up the airways, keep you hydrated and break up any mucus you might have in your throat and upper airway.  Try a teaspoon of honey in warm tea or or warm water.  A little bit of honey can soothe a sore throat.  However, children under 1 years of age should not try honey.  Gargle salt water.  Many people swear that salt water helps their sore throat.  There is no harm in trying and it might help you.  Use 1 tsp of salt in 8 oz of warm water.  Make sure you spit it out and disinfect the sink afterwards.  Breath in steam.  Use a hot shower, humidifier, vaporizer or other means of making steam.  Eat a frozen treat.  The coldness may numb the pain and soothe your throat if it is sore from coughing.  Get plenty of rest!  Take deep breaths.  Try to take 10 deep breaths per hour.  This help open up your airways and possibly prevent a severe pneumonia.  Try to sleep on your stomach.  There is some evidence that lying on the stomach might help people get move oxygen.   Try to sit upright, straight in a chair during the day. This will help with lung oxygenation.    SARS-CoV-2, which causes COVID-19, is a virus, and therefore antibiotics will not treat a coronavirus infection.    Steroids (Prednisone/Dexamethasone) have not been shown to be helpful in patients with mild covid infections.  Steroid treatments are recommended for hospitalized patients.    Stay isolated in one room, away from your family and other people as much as possible.   This includes eating in your room. Open windows to keep air circulating.  Use a separate bathroom, if possible.    Ending Isolation or quarantine  A minimum of at least 5 days since your symptoms started AND  At least 24 hours have passed with no fever without the use of fever-reducing medicine and other symptoms are improving -- loss of taste and smell might last for weeks or months after recovery but shouldn't delay ending isolation.  A mask is recommended for 5 days post  quarantine.      Take frequent sips of Pedialyte, Powerade, Gatorade.  Popsicles.  [place] diet, bananas, breads, rice.  Avoid red sauces, fruit juices, and dairy products as can irritate your stomach.  If drinking water, be sure to have something salty such as crackers to help restore electrolytes.  Return to emergency department for: Weakness, passing out, blood in stool, blood in vomit, fever, worsening abdominal pain, or other concerns.      Measure your blood pressure before restarting your blood pressure medications.

## 2024-02-08 NOTE — ED PROVIDER NOTES
"Emergency Medicine Provider Note - 2/8/2024       History     Chief Complaint   Patient presents with    General Illness     Sent over from pcp - tested positive for covid today at office. Pt just not feeling well since Sunday and has cough and generalized achy all over - "feels run down"   Hypotensive in office and thought pt needed some fluids at office.       Allergies:  Review of patient's allergies indicates:   Allergen Reactions    Augmentin [amoxicillin-pot clavulanate] Rash    Pcn [penicillins] Rash     Note: patient has tolerated both Cefazolin and Ceftriaxone in the past with no reported problems.         History of Present Illness   HPI    2/8/2024, 11:05 AM  The history is provided by the patient    Delfino Monk is a 68 y.o. male presenting to the ED for fatigue.  Patient reports that he was very fatigued starting on Sunday.  Associated symptoms included body aches, chills, nonproductive cough, and some diarrhea.  Patient reports decreased p.o. intake.  Patient denies any chest pain, chest pressure, vomiting, dysuria, urgency, frequency.  Patient was seen at his primary care physician's office today and referred to emergency department for low blood pressure.  Patient diagnosed with COVID today    Reviewed note from primary care physician:        Reviewed vitals:    VITAL SIGNS    VITAL SIGNS  Temperature 99.1 degrees Fahrenheit 02/08/2024   Blood pressure systolic 92 mm Hg 02/08/2024   Blood pressure diastolic 62 mm Hg 02/08/2024   Heart Rate 66 /min 02/08/2024   Respiratory Rate 16 /min 02/08/2024   Height 72 in 02/08/2024   Weight 195 lbs 02/08/2024   BMI 26.44 kg/m2 02/08/2024   Oximetry 96 % 02/08/2024   Weight-kg 88.45 02/08/2024     Arrival mode: Private Vehicle     PCP: Edgard Mendenhall MD     Past Medical History:  Past Medical History:   Diagnosis Date    BPH (benign prostatic hyperplasia)     General anesthetics causing adverse effect in therapeutic use     unable to urinate " following general anesthesia     HTN (hypertension)     Kidney stone     PONV (postoperative nausea and vomiting)        Past Surgical History:  Past Surgical History:   Procedure Laterality Date    CHOLECYSTECTOMY  2016    CYSTOSCOPY W/ URETERAL STENT PLACEMENT Left 8/22/2019    Procedure: CYSTOSCOPY, WITH URETERAL STENT INSERTION;  Surgeon: Bradly Green IV, MD;  Location: [facility];  Service: Urology;  Laterality: Left;    CYSTOSCOPY W/ URETERAL STENT PLACEMENT Left 9/24/2019    Procedure: CYSTOSCOPY, WITH URETERAL STENT INSERTION;  Surgeon: Bradly Green IV, MD;  Location: [facility] OR;  Service: Urology;  Laterality: Left;    HERNIA REPAIR Bilateral     inguinal hernia    HERNIA REPAIR Left     inguinal hernia     LASER LITHOTRIPSY Left 9/24/2019    Procedure: LITHOTRIPSY, USING LASER;  Surgeon: Bradly Green IV, MD;  Location: [facility] OR;  Service: Urology;  Laterality: Left;    left ankle      RETROGRADE PYELOGRAPHY Left 8/22/2019    Procedure: PYELOGRAM, RETROGRADE;  Surgeon: Bradly Green IV, MD;  Location: [facility] OR;  Service: Urology;  Laterality: Left;    TONSILLECTOMY, ADENOIDECTOMY      URETEROSCOPY Left 8/22/2019    Procedure: URETEROSCOPY;  Surgeon: Bradly Green IV, MD;  Location: [facility] OR;  Service: Urology;  Laterality: Left;    URETEROSCOPY Left 9/24/2019    Procedure: URETEROSCOPY;  Surgeon: Bradly Green IV, MD;  Location: [facility];  Service: Urology;  Laterality: Left;         Family History:  History reviewed. No pertinent family history.    Social History:  Social History     Tobacco Use    Smoking status: Never    Smokeless tobacco: Never   Substance and Sexual Activity    Alcohol use: No     Alcohol/week: 0.0 standard drinks of alcohol    Drug use: Never    Sexual activity: Not Currently        Review of Systems   Review of Systems   Constitutional:  Positive for appetite change (Decrease oral intake), chills and fatigue. Negative for fever.   HENT:  Negative for sore throat.     Respiratory:  Positive for cough. Negative for shortness of breath.    Cardiovascular:  Negative for chest pain.   Gastrointestinal:  Positive for diarrhea. Negative for nausea and vomiting.   Genitourinary:  Negative for dysuria.   Musculoskeletal:  Negative for back pain.   Skin:  Negative for rash.   Neurological:  Negative for weakness.          Physical Exam     Initial Vitals [02/08/24 1011]   BP Pulse Resp Temp SpO2   133/77 (!) 55 20 98.2 °F (36.8 °C) 97 %      MAP       --          Physical Exam    Nursing Notes and Vital Signs Reviewed.  Constitutional: Patient is in no apparent distress. Well-developed and well-nourished.  Head: Atraumatic. Normocephalic.  Eyes: PERRL. EOM intact. Conjunctivae are not pale. No scleral icterus.  ENT: Mucous membranes are moist. Oropharynx is clear and symmetric.    Neck: Supple. Full ROM. No lymphadenopathy.  Cardiovascular: Regular rate. Regular rhythm. No murmurs, rubs, or gallops. Distal pulses are 2+ and symmetric.  Pulmonary/Chest: No respiratory distress. Clear to auscultation bilaterally. No wheezing or rales.  Abdominal: Soft and non-distended.  There is no tenderness.  No rebound, guarding, or rigidity. Good bowel sounds.  Genitourinary: No CVA tenderness  Musculoskeletal: Moves all extremities. No obvious deformities. No edema. No calf tenderness.  Skin: Warm and dry.  Neurological:  Alert, awake, and appropriate.  Normal speech.  No acute focal neurological deficits are appreciated.  Psychiatric: Normal affect. Good eye contact. Appropriate in content.     ED Course   ED Procedures:  Procedures    ED Vital Signs:  Vitals:    02/08/24 1011   BP: 133/77   Pulse: (!) 55   Resp: 20   Temp: 98.2 °F (36.8 °C)   TempSrc: Oral   SpO2: 97%   Weight: 85.6 kg (188 lb 11.4 oz)   Height: 6' (1.829 m)       Abnormal Lab Results:  Labs Reviewed   CBC W/ AUTO DIFFERENTIAL - Abnormal; Notable for the following components:       Result Value    MCH 31.4 (*)     Mono % 17.1 (*)      All other components within normal limits   COMPREHENSIVE METABOLIC PANEL - Abnormal; Notable for the following components:    Glucose 153 (*)     Alkaline Phosphatase 50 (*)     All other components within normal limits   URINALYSIS, REFLEX TO URINE CULTURE    Narrative:     Specimen Source->Urine   MAGNESIUM   HIV 1 / 2 ANTIBODY   HEPATITIS C ANTIBODY   HEP C VIRUS HOLD SPECIMEN        All Lab Results:  Results for orders placed or performed during the hospital encounter of 02/08/24   CBC auto differential   Result Value Ref Range    WBC 5.38 3.90 - 12.70 K/uL    RBC 4.74 4.60 - 6.20 M/uL    Hemoglobin 14.9 14.0 - 18.0 g/dL    Hematocrit 42.7 40.0 - 54.0 %    MCV 90 82 - 98 fL    MCH 31.4 (H) 27.0 - 31.0 pg    MCHC 34.9 32.0 - 36.0 g/dL    RDW 11.9 11.5 - 14.5 %    Platelets 184 150 - 450 K/uL    MPV 10.0 9.2 - 12.9 fL    Immature Granulocytes 0.2 0.0 - 0.5 %    Gran # (ANC) 2.6 1.8 - 7.7 K/uL    Immature Grans (Abs) 0.01 0.00 - 0.04 K/uL    Lymph # 1.5 1.0 - 4.8 K/uL    Mono # 0.9 0.3 - 1.0 K/uL    Eos # 0.3 0.0 - 0.5 K/uL    Baso # 0.03 0.00 - 0.20 K/uL    nRBC 0 0 /100 WBC    Gran % 47.6 38.0 - 73.0 %    Lymph % 28.4 18.0 - 48.0 %    Mono % 17.1 (H) 4.0 - 15.0 %    Eosinophil % 6.1 0.0 - 8.0 %    Basophil % 0.6 0.0 - 1.9 %    Differential Method Automated    Comprehensive metabolic panel   Result Value Ref Range    Sodium 137 136 - 145 mmol/L    Potassium 4.8 3.5 - 5.1 mmol/L    Chloride 103 95 - 110 mmol/L    CO2 26 23 - 29 mmol/L    Glucose 153 (H) 70 - 110 mg/dL    BUN 13 8 - 23 mg/dL    Creatinine 1.0 0.5 - 1.4 mg/dL    Calcium 9.2 8.7 - 10.5 mg/dL    Total Protein 7.2 6.0 - 8.4 g/dL    Albumin 4.0 3.5 - 5.2 g/dL    Total Bilirubin 0.7 0.1 - 1.0 mg/dL    Alkaline Phosphatase 50 (L) 55 - 135 U/L    AST 26 10 - 40 U/L    ALT 34 10 - 44 U/L    eGFR >60.0 >60 mL/min/1.73 m^2    Anion Gap 8 8 - 16 mmol/L   Urinalysis, Reflex to Urine Culture Urine, Clean Catch    Specimen: Urine   Result Value Ref Range     Specimen UA Urine, Clean Catch     Color, UA Yellow Yellow, Straw, Kyra    Appearance, UA Clear Clear    pH, UA 6.0 5.0 - 8.0    Specific Gravity, UA 1.025 1.005 - 1.030    Protein, UA Negative Negative    Glucose, UA Negative Negative    Ketones, UA Negative Negative    Bilirubin (UA) Negative Negative    Occult Blood UA Negative Negative    Nitrite, UA Negative Negative    Urobilinogen, UA <2.0 <2.0 EU/dL    Leukocytes, UA Negative Negative   Magnesium   Result Value Ref Range    Magnesium 1.8 1.6 - 2.6 mg/dL           Imaging Results:  Imaging Results    None               The Emergency Provider reviewed the vital signs and test results, which are outlined above.     ED Discussion   ED Medication(s):  Medications   sodium chloride 0.9% bolus 1,000 mL 1,000 mL (0 mLs Intravenous Stopped 2/8/24 1233)   sodium chloride 0.9% bolus 500 mL 500 mL (0 mLs Intravenous Stopped 2/8/24 1330)       ED Course as of 02/08/24 1623   Thu Feb 08, 2024   1223 BUN: 13 [LB]   1223 Creatinine: 1.0 [LB]   1318 Patient in no acute distress.  Discussed recommendations for checking blood pressure before restarting blood pressure medications.  Discussed indications to return emergency department.  All questions answered. [LB]      ED Course User Index  [LB] Shelby Rod,             13:20  Reassessment: Dr. Rod reassessed the pt.  The pt is resting comfortably and is NAD.  Pt states their sx have improved. Discussed test results, shared treatment plan, specific conditions for return, and the need for f/u.  Answered their questions at this time.  Pt understands and agrees to the plan.  The pt has remained hemodynamically stable through ED course and is stable for discharge.    I discussed with patient and/or family/caretaker that evaluation in the ED does not suggest any emergent or life threatening medical conditions requiring immediate intervention beyond what was provided in the ED, and I believe patient is safe for  discharge.  Regardless, an unremarkable evaluation in the ED does not preclude the development or presence of a serious of life threatening condition. As such, patient was instructed to return immediately for any worsening or change in current symptoms.     MIPS Measures     Smoker? No     Hypertension: History of Hypertension: The patient has elevated blood pressure (higher than 120/80) while being treated in the ED but has a history of hypertension.       Medical Decision Making                 Medical Decision Making  Differential diagnosis includes acute kidney injury, dehydration, electrolyte abnormality    Patient has had decreased p.o. intake since taking ill 4 days prior.  Symptoms include diarrhea, fatigue, nonproductive cough.  Patient tested positive for COVID.    White cell count normal.  BUN/creatinine 13/1.0.  Mag 1.8.    Amount and/or Complexity of Data Reviewed  External Data Reviewed: notes.     Details: Reviewed notes from primary care physician's office see HPI.   Labs: ordered. Decision-making details documented in ED Course.  Discussion of management or test interpretation with external provider(s): Discharge Medication List as of 2/8/2024  1:18 PM    START taking these medications    dextromethorphan-guaiFENesin  mg/5 ml (ROBITUSSIN-DM)  mg/5 mL liquid  Take 10 mLs by mouth every 6 (six) hours as needed (cough)., Starting Thu 2/8/2024, Normal            Risk  OTC drugs.        Coding    Prescription Management: I performed a review of the patient's current Rx medication list as input by nursing staff.    Discharge Medication List as of 2/8/2024  1:18 PM        START taking these medications    Details   dextromethorphan-guaiFENesin  mg/5 ml (ROBITUSSIN-DM)  mg/5 mL liquid Take 10 mLs by mouth every 6 (six) hours as needed (cough)., Starting Thu 2/8/2024, Normal           CONTINUE these medications which have NOT CHANGED    Details   finasteride (PROSCAR) 5 mg tablet Take  "1 tablet (5 mg total) by mouth once daily., Starting Thu 6/4/2020, Until Thu 2/8/2024, Normal      olmesartan (BENICAR) 20 MG tablet Take 20 mg by mouth every evening. , Historical Med      tamsulosin (FLOMAX) 0.4 mg Cap Take 1 capsule (0.4 mg total) by mouth once daily., Starting Thu 6/4/2020, Normal      acetaminophen (TYLENOL) 500 MG tablet Take 500 mg by mouth every 6 (six) hours as needed for Pain., Historical Med      aspirin (ECOTRIN) 81 MG EC tablet Take 81 mg by mouth once daily., Historical Med      docusate sodium (COLACE) 100 MG capsule Take 1 capsule (100 mg total) by mouth 2 (two) times daily., Starting Thu 8/22/2019, Normal      oxyCODONE-acetaminophen (PERCOCET) 5-325 mg per tablet Take 1-2 tablets by mouth every 4 (four) hours as needed for Pain., Starting Tue 9/24/2019, Normal      rosuvastatin (CRESTOR) 20 MG tablet TAKE ONE TABLET BY MOUTH ONCE AT NIGHT, Historical Med      sildenafil (VIAGRA) 50 MG tablet Take 1 tablet (50 mg total) by mouth daily as needed for Erectile Dysfunction., Starting Wed 11/13/2019, Until Thu 11/12/2020, Normal              Discussed case with:N/A      Portions of this note may have been created with voice recognition software. Occasional "wrong-word" or "sound-a-like" substitutions may have occurred due to the inherent limitations of voice recognition software. Please, read the note carefully and recognize, using context, where substitutions have occurred.          Clinical Impression       ICD-10-CM ICD-9-CM   1. COVID  U07.1 079.89        Disposition        Disposition: Discharge to home  Patient condition: Good      ED Follow-up      Follow-up Information       Edgard Mendenhall MD In 2 days.    Specialty: Family Medicine  Why: Return to emergency department for leg pain, leg swelling, chest pain, chest pressure, shortness of breath, passing out, weakness, or worsening condition  Contact information:  [address]  [place] [address]  [phone]          "                             Shelby Rod,   02/08/24 1620
